# Patient Record
Sex: FEMALE | Race: WHITE | NOT HISPANIC OR LATINO
[De-identification: names, ages, dates, MRNs, and addresses within clinical notes are randomized per-mention and may not be internally consistent; named-entity substitution may affect disease eponyms.]

---

## 2017-02-02 ENCOUNTER — RESULT REVIEW (OUTPATIENT)
Age: 52
End: 2017-02-02

## 2019-01-01 ENCOUNTER — APPOINTMENT (OUTPATIENT)
Dept: THORACIC SURGERY | Facility: CLINIC | Age: 54
End: 2019-01-01
Payer: COMMERCIAL

## 2019-01-01 ENCOUNTER — OUTPATIENT (OUTPATIENT)
Dept: OUTPATIENT SERVICES | Facility: HOSPITAL | Age: 54
LOS: 1 days | End: 2019-01-01

## 2019-01-01 ENCOUNTER — APPOINTMENT (OUTPATIENT)
Dept: THORACIC SURGERY | Facility: HOSPITAL | Age: 54
End: 2019-01-01

## 2019-01-01 ENCOUNTER — OUTPATIENT (OUTPATIENT)
Dept: OUTPATIENT SERVICES | Facility: HOSPITAL | Age: 54
LOS: 1 days | Discharge: HOME | End: 2019-01-01
Payer: COMMERCIAL

## 2019-01-01 VITALS
OXYGEN SATURATION: 98 % | SYSTOLIC BLOOD PRESSURE: 128 MMHG | TEMPERATURE: 98 F | DIASTOLIC BLOOD PRESSURE: 82 MMHG | HEIGHT: 61.5 IN | RESPIRATION RATE: 16 BRPM | HEART RATE: 88 BPM | WEIGHT: 162.04 LBS

## 2019-01-01 VITALS
HEART RATE: 76 BPM | WEIGHT: 157 LBS | OXYGEN SATURATION: 98 % | RESPIRATION RATE: 17 BRPM | HEIGHT: 65 IN | SYSTOLIC BLOOD PRESSURE: 137 MMHG | TEMPERATURE: 97.9 F | BODY MASS INDEX: 26.16 KG/M2 | DIASTOLIC BLOOD PRESSURE: 79 MMHG

## 2019-01-01 VITALS
TEMPERATURE: 98.8 F | BODY MASS INDEX: 26.16 KG/M2 | HEART RATE: 86 BPM | HEIGHT: 65 IN | RESPIRATION RATE: 17 BRPM | DIASTOLIC BLOOD PRESSURE: 81 MMHG | OXYGEN SATURATION: 99 % | SYSTOLIC BLOOD PRESSURE: 133 MMHG | WEIGHT: 157 LBS

## 2019-01-01 VITALS
HEIGHT: 65 IN | WEIGHT: 162 LBS | BODY MASS INDEX: 26.99 KG/M2 | HEART RATE: 89 BPM | DIASTOLIC BLOOD PRESSURE: 79 MMHG | TEMPERATURE: 98.5 F | SYSTOLIC BLOOD PRESSURE: 144 MMHG | OXYGEN SATURATION: 96 % | RESPIRATION RATE: 17 BRPM

## 2019-01-01 VITALS
HEIGHT: 61.5 IN | TEMPERATURE: 99 F | DIASTOLIC BLOOD PRESSURE: 50 MMHG | HEART RATE: 85 BPM | RESPIRATION RATE: 16 BRPM | OXYGEN SATURATION: 97 % | WEIGHT: 162.04 LBS | SYSTOLIC BLOOD PRESSURE: 108 MMHG

## 2019-01-01 VITALS
SYSTOLIC BLOOD PRESSURE: 117 MMHG | DIASTOLIC BLOOD PRESSURE: 49 MMHG | RESPIRATION RATE: 14 BRPM | OXYGEN SATURATION: 100 % | HEART RATE: 80 BPM

## 2019-01-01 DIAGNOSIS — C50.919 MALIGNANT NEOPLASM OF UNSPECIFIED SITE OF UNSPECIFIED FEMALE BREAST: ICD-10-CM

## 2019-01-01 DIAGNOSIS — J90 PLEURAL EFFUSION, NOT ELSEWHERE CLASSIFIED: ICD-10-CM

## 2019-01-01 DIAGNOSIS — Z87.39 PERSONAL HISTORY OF OTHER DISEASES OF THE MUSCULOSKELETAL SYSTEM AND CONNECTIVE TISSUE: ICD-10-CM

## 2019-01-01 DIAGNOSIS — Z87.09 PERSONAL HISTORY OF OTHER DISEASES OF THE RESPIRATORY SYSTEM: Chronic | ICD-10-CM

## 2019-01-01 DIAGNOSIS — J91.0 MALIGNANT PLEURAL EFFUSION: ICD-10-CM

## 2019-01-01 LAB
ANION GAP SERPL CALC-SCNC: 11 MMO/L — SIGNIFICANT CHANGE UP (ref 7–14)
BUN SERPL-MCNC: 11 MG/DL — SIGNIFICANT CHANGE UP (ref 7–23)
CALCIUM SERPL-MCNC: 9.5 MG/DL — SIGNIFICANT CHANGE UP (ref 8.4–10.5)
CHLORIDE SERPL-SCNC: 101 MMOL/L — SIGNIFICANT CHANGE UP (ref 98–107)
CO2 SERPL-SCNC: 25 MMOL/L — SIGNIFICANT CHANGE UP (ref 22–31)
CREAT SERPL-MCNC: 0.66 MG/DL — SIGNIFICANT CHANGE UP (ref 0.5–1.3)
GLUCOSE SERPL-MCNC: 84 MG/DL — SIGNIFICANT CHANGE UP (ref 70–99)
HCT VFR BLD CALC: 35.2 % — SIGNIFICANT CHANGE UP (ref 34.5–45)
HGB BLD-MCNC: 10.9 G/DL — LOW (ref 11.5–15.5)
MCHC RBC-ENTMCNC: 27.2 PG — SIGNIFICANT CHANGE UP (ref 27–34)
MCHC RBC-ENTMCNC: 31 % — LOW (ref 32–36)
MCV RBC AUTO: 87.8 FL — SIGNIFICANT CHANGE UP (ref 80–100)
NON-GYNECOLOGICAL CYTOLOGY STUDY: SIGNIFICANT CHANGE UP
NRBC # FLD: 0 K/UL — SIGNIFICANT CHANGE UP (ref 0–0)
PLATELET # BLD AUTO: 372 K/UL — SIGNIFICANT CHANGE UP (ref 150–400)
PMV BLD: 10.3 FL — SIGNIFICANT CHANGE UP (ref 7–13)
POTASSIUM SERPL-MCNC: 4.6 MMOL/L — SIGNIFICANT CHANGE UP (ref 3.5–5.3)
POTASSIUM SERPL-SCNC: 4.6 MMOL/L — SIGNIFICANT CHANGE UP (ref 3.5–5.3)
RBC # BLD: 4.01 M/UL — SIGNIFICANT CHANGE UP (ref 3.8–5.2)
RBC # FLD: 17 % — HIGH (ref 10.3–14.5)
SODIUM SERPL-SCNC: 137 MMOL/L — SIGNIFICANT CHANGE UP (ref 135–145)
WBC # BLD: 1.83 K/UL — LOW (ref 3.8–10.5)
WBC # FLD AUTO: 1.83 K/UL — LOW (ref 3.8–10.5)

## 2019-01-01 PROCEDURE — 99024 POSTOP FOLLOW-UP VISIT: CPT

## 2019-01-01 PROCEDURE — 99213 OFFICE O/P EST LOW 20 MIN: CPT

## 2019-01-01 PROCEDURE — 32552 REMOVE LUNG CATHETER: CPT

## 2019-01-01 PROCEDURE — 71045 X-RAY EXAM CHEST 1 VIEW: CPT | Mod: 26

## 2019-01-01 RX ORDER — HYDROMORPHONE HYDROCHLORIDE 2 MG/1
2 TABLET ORAL
Refills: 0 | Status: ACTIVE | COMMUNITY

## 2019-01-01 RX ORDER — SODIUM CHLORIDE 9 MG/ML
1000 INJECTION, SOLUTION INTRAVENOUS
Refills: 0 | Status: DISCONTINUED | OUTPATIENT
Start: 2019-01-01 | End: 2019-01-01

## 2019-01-01 RX ORDER — ONDANSETRON HYDROCHLORIDE 4 MG/1
4 TABLET, FILM COATED ORAL
Refills: 0 | Status: ACTIVE | COMMUNITY

## 2019-09-04 ENCOUNTER — TRANSCRIPTION ENCOUNTER (OUTPATIENT)
Age: 54
End: 2019-09-04

## 2019-09-04 ENCOUNTER — APPOINTMENT (OUTPATIENT)
Dept: THORACIC SURGERY | Facility: CLINIC | Age: 54
End: 2019-09-04
Payer: COMMERCIAL

## 2019-09-04 VITALS
SYSTOLIC BLOOD PRESSURE: 138 MMHG | HEART RATE: 85 BPM | RESPIRATION RATE: 17 BRPM | DIASTOLIC BLOOD PRESSURE: 78 MMHG | TEMPERATURE: 98.3 F | WEIGHT: 168 LBS | HEIGHT: 65 IN | BODY MASS INDEX: 27.99 KG/M2 | OXYGEN SATURATION: 95 %

## 2019-09-04 DIAGNOSIS — Z82.49 FAMILY HISTORY OF ISCHEMIC HEART DISEASE AND OTHER DISEASES OF THE CIRCULATORY SYSTEM: ICD-10-CM

## 2019-09-04 DIAGNOSIS — Z82.3 FAMILY HISTORY OF STROKE: ICD-10-CM

## 2019-09-04 DIAGNOSIS — Z80.51 FAMILY HISTORY OF MALIGNANT NEOPLASM OF KIDNEY: ICD-10-CM

## 2019-09-04 PROCEDURE — 99205 OFFICE O/P NEW HI 60 MIN: CPT

## 2019-09-04 RX ORDER — MELOXICAM 15 MG/1
15 TABLET ORAL
Refills: 0 | Status: ACTIVE | COMMUNITY

## 2019-09-04 RX ORDER — LORATADINE 10 MG/1
TABLET ORAL
Refills: 0 | Status: ACTIVE | COMMUNITY

## 2019-09-04 RX ORDER — LORAZEPAM 1 MG/1
1 TABLET ORAL
Refills: 0 | Status: ACTIVE | COMMUNITY

## 2019-09-04 RX ORDER — OMEPRAZOLE 40 MG/1
40 CAPSULE, DELAYED RELEASE ORAL
Refills: 0 | Status: ACTIVE | COMMUNITY

## 2019-09-05 ENCOUNTER — FORM ENCOUNTER (OUTPATIENT)
Age: 54
End: 2019-09-05

## 2019-09-05 ENCOUNTER — TRANSCRIPTION ENCOUNTER (OUTPATIENT)
Age: 54
End: 2019-09-05

## 2019-09-06 ENCOUNTER — OUTPATIENT (OUTPATIENT)
Dept: OUTPATIENT SERVICES | Facility: HOSPITAL | Age: 54
LOS: 1 days | Discharge: HOME | End: 2019-09-06
Payer: COMMERCIAL

## 2019-09-06 ENCOUNTER — APPOINTMENT (OUTPATIENT)
Dept: THORACIC SURGERY | Facility: HOSPITAL | Age: 54
End: 2019-09-06

## 2019-09-06 ENCOUNTER — TRANSCRIPTION ENCOUNTER (OUTPATIENT)
Age: 54
End: 2019-09-06

## 2019-09-06 ENCOUNTER — RESULT REVIEW (OUTPATIENT)
Age: 54
End: 2019-09-06

## 2019-09-06 VITALS
HEIGHT: 61 IN | TEMPERATURE: 98 F | WEIGHT: 164.91 LBS | SYSTOLIC BLOOD PRESSURE: 104 MMHG | DIASTOLIC BLOOD PRESSURE: 79 MMHG | HEART RATE: 91 BPM | RESPIRATION RATE: 17 BRPM | OXYGEN SATURATION: 97 %

## 2019-09-06 VITALS
HEART RATE: 97 BPM | RESPIRATION RATE: 18 BRPM | DIASTOLIC BLOOD PRESSURE: 75 MMHG | OXYGEN SATURATION: 100 % | SYSTOLIC BLOOD PRESSURE: 125 MMHG

## 2019-09-06 DIAGNOSIS — J90 PLEURAL EFFUSION, NOT ELSEWHERE CLASSIFIED: ICD-10-CM

## 2019-09-06 LAB
BASOPHILS # BLD AUTO: 0.02 K/UL — SIGNIFICANT CHANGE UP (ref 0–0.2)
BASOPHILS NFR BLD AUTO: 0.4 % — SIGNIFICANT CHANGE UP (ref 0–2)
EOSINOPHIL # BLD AUTO: 0.11 K/UL — SIGNIFICANT CHANGE UP (ref 0–0.5)
EOSINOPHIL NFR BLD AUTO: 2.4 % — SIGNIFICANT CHANGE UP (ref 0–6)
GAS PNL BLDV: 141 MMOL/L — SIGNIFICANT CHANGE UP (ref 136–146)
GLUCOSE BLDV-MCNC: 91 MG/DL — SIGNIFICANT CHANGE UP (ref 70–99)
HCT VFR BLD CALC: 39 % — SIGNIFICANT CHANGE UP (ref 34.5–45)
HCT VFR BLDV CALC: 37.8 % — SIGNIFICANT CHANGE UP (ref 34.5–45)
HGB BLD-MCNC: 12.3 G/DL — SIGNIFICANT CHANGE UP (ref 11.5–15.5)
IMM GRANULOCYTES NFR BLD AUTO: 0.4 % — SIGNIFICANT CHANGE UP (ref 0–1.5)
LYMPHOCYTES # BLD AUTO: 0.68 K/UL — LOW (ref 1–3.3)
LYMPHOCYTES # BLD AUTO: 14.9 % — SIGNIFICANT CHANGE UP (ref 13–44)
MCHC RBC-ENTMCNC: 25.4 PG — LOW (ref 27–34)
MCHC RBC-ENTMCNC: 31.5 % — LOW (ref 32–36)
MCV RBC AUTO: 80.6 FL — SIGNIFICANT CHANGE UP (ref 80–100)
MONOCYTES # BLD AUTO: 0.41 K/UL — SIGNIFICANT CHANGE UP (ref 0–0.9)
MONOCYTES NFR BLD AUTO: 9 % — SIGNIFICANT CHANGE UP (ref 2–14)
NEUTROPHILS # BLD AUTO: 3.31 K/UL — SIGNIFICANT CHANGE UP (ref 1.8–7.4)
NEUTROPHILS NFR BLD AUTO: 72.9 % — SIGNIFICANT CHANGE UP (ref 43–77)
NRBC # FLD: 0.02 K/UL — SIGNIFICANT CHANGE UP (ref 0–0)
PLATELET # BLD AUTO: 236 K/UL — SIGNIFICANT CHANGE UP (ref 150–400)
PMV BLD: 10.2 FL — SIGNIFICANT CHANGE UP (ref 7–13)
POTASSIUM BLDV-SCNC: 4.9 MMOL/L — HIGH (ref 3.4–4.5)
RBC # BLD: 4.84 M/UL — SIGNIFICANT CHANGE UP (ref 3.8–5.2)
RBC # FLD: 13.8 % — SIGNIFICANT CHANGE UP (ref 10.3–14.5)
WBC # BLD: 4.55 K/UL — SIGNIFICANT CHANGE UP (ref 3.8–10.5)
WBC # FLD AUTO: 4.55 K/UL — SIGNIFICANT CHANGE UP (ref 3.8–10.5)

## 2019-09-06 PROCEDURE — 88112 CYTOPATH CELL ENHANCE TECH: CPT | Mod: 26

## 2019-09-06 PROCEDURE — 88305 TISSUE EXAM BY PATHOLOGIST: CPT | Mod: 26

## 2019-09-06 PROCEDURE — 32550 INSERT PLEURAL CATH: CPT | Mod: 59

## 2019-09-06 PROCEDURE — 71045 X-RAY EXAM CHEST 1 VIEW: CPT | Mod: 26,77

## 2019-09-06 PROCEDURE — 36563 INSERT TUNNELED CV CATH: CPT

## 2019-09-06 PROCEDURE — 71045 X-RAY EXAM CHEST 1 VIEW: CPT | Mod: 26

## 2019-09-06 PROCEDURE — 32557 INSERT CATH PLEURA W/ IMAGE: CPT

## 2019-09-06 RX ORDER — SODIUM CHLORIDE 9 MG/ML
1000 INJECTION, SOLUTION INTRAVENOUS
Refills: 0 | Status: DISCONTINUED | OUTPATIENT
Start: 2019-09-06 | End: 2019-01-01

## 2019-09-06 RX ORDER — ACETAMINOPHEN 500 MG
2 TABLET ORAL
Qty: 0 | Refills: 0 | DISCHARGE
Start: 2019-09-06

## 2019-09-06 RX ORDER — OXYCODONE HYDROCHLORIDE 5 MG/1
5 TABLET ORAL EVERY 4 HOURS
Refills: 0 | Status: DISCONTINUED | OUTPATIENT
Start: 2019-09-06 | End: 2019-09-06

## 2019-09-06 RX ORDER — MELOXICAM 15 MG/1
1 TABLET ORAL
Qty: 0 | Refills: 0 | DISCHARGE

## 2019-09-06 RX ORDER — HYDROMORPHONE HYDROCHLORIDE 2 MG/ML
1 INJECTION INTRAMUSCULAR; INTRAVENOUS; SUBCUTANEOUS ONCE
Refills: 0 | Status: DISCONTINUED | OUTPATIENT
Start: 2019-09-06 | End: 2019-09-06

## 2019-09-06 RX ORDER — DOCUSATE SODIUM 100 MG
100 CAPSULE ORAL THREE TIMES A DAY
Refills: 0 | Status: DISCONTINUED | OUTPATIENT
Start: 2019-09-06 | End: 2019-09-06

## 2019-09-06 RX ORDER — SENNA PLUS 8.6 MG/1
2 TABLET ORAL AT BEDTIME
Refills: 0 | Status: DISCONTINUED | OUTPATIENT
Start: 2019-09-06 | End: 2019-09-06

## 2019-09-06 RX ORDER — ACETAMINOPHEN 500 MG
650 TABLET ORAL ONCE
Refills: 0 | Status: DISCONTINUED | OUTPATIENT
Start: 2019-09-06 | End: 2019-09-06

## 2019-09-06 RX ORDER — SODIUM CHLORIDE 9 MG/ML
1000 INJECTION, SOLUTION INTRAVENOUS
Refills: 0 | Status: DISCONTINUED | OUTPATIENT
Start: 2019-09-06 | End: 2019-09-06

## 2019-09-06 RX ORDER — METOCLOPRAMIDE HCL 10 MG
10 TABLET ORAL ONCE
Refills: 0 | Status: COMPLETED | OUTPATIENT
Start: 2019-09-06 | End: 2019-09-06

## 2019-09-06 RX ADMIN — SODIUM CHLORIDE 30 MILLILITER(S): 9 INJECTION, SOLUTION INTRAVENOUS at 16:15

## 2019-09-06 RX ADMIN — Medication 10 MILLIGRAM(S): at 16:55

## 2019-09-06 RX ADMIN — SODIUM CHLORIDE 30 MILLILITER(S): 9 INJECTION, SOLUTION INTRAVENOUS at 11:33

## 2019-09-06 NOTE — HISTORY OF PRESENT ILLNESS
[FreeTextEntry1] : 54 year old female with history of breast cancer in 2004 s/p lumpectomy, chemo, and radiation. She was on Tamoxifen until 2015. She was found to have mets to the bones and was started on different endocrine therapies. She was found to have left pleural effusion which she underwent thoracentesis x 2. Pleural fluid was positive for malignant cells consistent with breast origin. She presents today for thoracic surgery consultation for recurrent pleural effusion referred by Dr. Leiva. \par \par CXR 8/30/19 reveals increased opacification in the left mid and lower lung zone. Pleural density has increase and extends along the lateral aspect of the hemithorax nearly to the lung apex. \par \par The patient reports increased shortness of breath with exertion, dry cough, and decreased appetite. The patient denies fever, chills, chest pain, or hemoptysis.

## 2019-09-06 NOTE — ASU DISCHARGE PLAN (ADULT/PEDIATRIC) - CARE PROVIDER_API CALL
Ronaldo Londono)  Thoracic Surgery  [id number] [place] [address]  Phone: [phone]  Fax: [phone]  Follow Up Time:

## 2019-09-06 NOTE — PHYSICAL EXAM
[General Appearance - Alert] : alert [General Appearance - Well Developed] : well developed [General Appearance - Well-Appearing] : healthy appearing [Sclera] : the sclera and conjunctiva were normal [Extraocular Movements] : extraocular movements were intact [Hearing Threshold Finger Rub Not [place]] : hearing was normal [Examination Of The Oral Cavity] : the lips and gums were normal [Neck Appearance] : the appearance of the neck was normal [Jugular Venous Distention Increased] : there was no jugular-venous distention [Neck Cervical Mass (___cm)] : no neck mass was observed [] : no respiratory distress [Exaggerated Use Of Accessory Muscles For Inspiration] : no accessory muscle use [Respiration, Rhythm And Depth] : normal respiratory rhythm and effort [Auscultation Breath Sounds / Voice Sounds] : lungs were clear to auscultation bilaterally [Diminished Respiratory Excursion] : normal chest expansion [Bowel Sounds] : normal bowel sounds [Abdomen Soft] : soft [Cervical Lymph Nodes Enlarged Anterior Bilaterally] : anterior cervical [Abdomen Tenderness] : non-tender [Cervical Lymph Nodes Enlarged Posterior Bilaterally] : posterior cervical [Abnormal Walk] : normal gait [Skin Color & Pigmentation] : normal skin color and pigmentation [No Focal Deficits] : no focal deficits [Impaired Insight] : insight and judgment were intact [Oriented To Time, Place, And Person] : oriented to person, place, and time [Affect] : the affect was normal [Mood] : the mood was normal [FreeTextEntry1] : Left lung diminished breath sounds mid to lower lung fields.

## 2019-09-06 NOTE — BRIEF OPERATIVE NOTE - OPERATION/FINDINGS
1800cc of left pleural effusion drained. Left pleurX catheter placement. Pleural fluid sent to cytology.  Right Subclavian vein Mediport placement using fluoroscopy placed for purposes of chemotherapy.

## 2019-09-06 NOTE — ASU PATIENT PROFILE, ADULT - PMH
Breast Cancer h/o chemo and radiation in 2005    Congenital Hip Dysplasia    GERD (Gastroesophageal Reflux Disease)    H/O: Osteoarthritis    HPV (Human Papillomavirus) in 2004    Hyperlipidemia - allergic to all statin drugs - on no meds

## 2019-09-06 NOTE — BRIEF OPERATIVE NOTE - NSICDXBRIEFPROCEDURE_GEN_ALL_CORE_FT
PROCEDURES:  Placement of peripherally inserted central venous catheter with imaging guidance 06-Sep-2019 16:45:21 Right Subclavian venous catheter placement (mediport) using flouroscopy for chemotherapy purposes Serene Pacheco  Drainage of pleural effusion 06-Sep-2019 16:33:58  Serene Pacheco  Insertion, PleurX catheter system, pleural cavity 06-Sep-2019 16:31:57  Serene Pacheco

## 2019-09-06 NOTE — H&P ADULT - NSICDXPASTSURGICALHX_GEN_ALL_CORE_FT
PAST SURGICAL HISTORY:  Breast Cancer - chemoport insertion  and removal 2005 and 2006     Cryo ablation of cervix in 2006     h/o  Breast Lumpectomyn - left 2005     H/O: Myomectomy 2003     History of Total Hip Replacement left 2003

## 2019-09-06 NOTE — DISCHARGE NOTE PROVIDER - NSDCFUADDINST_GEN_ALL_CORE_FT
- Leave dressing intact for Mediport and PleurX by reinforcing with tape if necessary. At that time you may remove the dressing and take a shower. Place clean gauze over wound if continual drainage. Cover pleurX dressing when showering. Continue with daily ambulation.   - Call the office if you experience any fevers, shortness of breath, chest pain or excessive or purulent drainage from the incision site, leg swelling day or night. Go to the emergency room if any of these symptoms are severe.   - Visiting nurse services are being set up for you to drain your PleurX 3x a week.   - Take your medications as ordered.  - Call Dr. Londono's office at [phone] tomorrow or the next business day to make a followup appointment.

## 2019-09-06 NOTE — ASU DISCHARGE PLAN (ADULT/PEDIATRIC) - POST OP PHONE #
[phone] pt. granted permission to leave message /and or speak with whoever answers the phone.

## 2019-09-06 NOTE — PHYSICAL EXAM
[General Appearance - Alert] : alert [General Appearance - Well Developed] : well developed [General Appearance - Well-Appearing] : healthy appearing [Sclera] : the sclera and conjunctiva were normal [Extraocular Movements] : extraocular movements were intact [Hearing Threshold Finger Rub Not [place]] : hearing was normal [Examination Of The Oral Cavity] : the lips and gums were normal [Neck Appearance] : the appearance of the neck was normal [Neck Cervical Mass (___cm)] : no neck mass was observed [Jugular Venous Distention Increased] : there was no jugular-venous distention [] : no respiratory distress [Exaggerated Use Of Accessory Muscles For Inspiration] : no accessory muscle use [Respiration, Rhythm And Depth] : normal respiratory rhythm and effort [Auscultation Breath Sounds / Voice Sounds] : lungs were clear to auscultation bilaterally [Diminished Respiratory Excursion] : normal chest expansion [Bowel Sounds] : normal bowel sounds [Abdomen Soft] : soft [Cervical Lymph Nodes Enlarged Anterior Bilaterally] : anterior cervical [Abdomen Tenderness] : non-tender [Cervical Lymph Nodes Enlarged Posterior Bilaterally] : posterior cervical [Abnormal Walk] : normal gait [Skin Color & Pigmentation] : normal skin color and pigmentation [No Focal Deficits] : no focal deficits [Oriented To Time, Place, And Person] : oriented to person, place, and time [Impaired Insight] : insight and judgment were intact [Affect] : the affect was normal [Mood] : the mood was normal [FreeTextEntry1] : Left lung diminished breath sounds mid to lower lung fields.

## 2019-09-06 NOTE — H&P ADULT - NSHPPHYSICALEXAM_GEN_ALL_CORE
Eyes: the sclera and conjunctiva were normal and extraocular movements were intact.   ENT: hearing was normal and the lips and gums were normal.   Neck: the appearance of the neck was normal, the neck was supple and no neck mass was observed . there was no jugular-venous distention.   Pulmonary: no respiratory distress, normal respiratory rhythm and effort, no accessory muscle use and lungs were clear to auscultation bilaterally . Left lung diminished breath sounds mid to lower lung fields.   Chest: normal chest expansion.   Abdomen: normal bowel sounds, soft and non-tender.   Lymphatics: The posterior cervical and anterior cervical nodes were non-tender and normal size.   Musculoskeletal: normal gait.   Skin: normal skin color and pigmentation.   Neurological: no focal deficits.   Psychiatric: oriented to person, place, and time, insight and judgment were intact, the affect was normal and the mood was normal.

## 2019-09-06 NOTE — ASU DISCHARGE PLAN (ADULT/PEDIATRIC) - CALL YOUR DOCTOR IF YOU HAVE ANY OF THE FOLLOWING:
Bleeding that does not stop Fever greater than (need to indicate Fahrenheit or Celsius)/shortness of breath/Nausea and vomiting that does not stop/Unable to urinate/Wound/Surgical Site with redness, or foul smelling discharge or pus/Pain not relieved by Medications/Bleeding that does not stop/Swelling that gets worse

## 2019-09-06 NOTE — H&P ADULT - ASSESSMENT
54 year old female with history of breast cancer in 2004 s/p lumpectomy, chemo, and radiation. She was on Tamoxifen until 2015. She was found to have mets to the bones and was started on different endocrine therapies. She was found to have left pleural effusion which she underwent thoracentesis x 2. Pleural fluid was positive for malignant cells consistent with breast origin. She presents today for thoracic surgery consultation for recurrent pleural effusion referred by Dr. Leiva.     CXR 8/30/19 reveals increased opacification in the left mid and lower lung zone. Pleural density has increase and extends along the lateral aspect of the hemithorax nearly to the lung apex.     The patient reports increased shortness of breath with exertion, dry cough, and decreased appetite. The patient denies fever, chills, chest pain, or hemoptysis.     I have reviewed the medical records and images with the patient and made the following recommendations.We discussed options for recurrent pleural effusion including Talc Pleurodesis and Pleurx catheter placement. The patient preferred Pleurx catheter placement in order to begin chemotherapy as soon as possible. I will schedule her for placement of left Pleurx catheter and Mediport. The risks, benefits, and alternatives to the procedure were discussed with the patient at length. She verbalized understanding, and is in agreement with the above treatment plan.

## 2019-09-06 NOTE — ASSESSMENT
[FreeTextEntry1] : 54 year old female with history of breast cancer in 2004 s/p lumpectomy, chemo, and radiation. She was on Tamoxifen until 2015. She was found to have mets to the bones and was started on different endocrine therapies. She was found to have left pleural effusion which she underwent thoracentesis x 2. Pleural fluid was positive for malignant cells consistent with breast origin. She presents today for thoracic surgery consultation for recurrent pleural effusion referred by Dr. Leiva. \par \par CXR 8/30/19 reveals increased opacification in the left mid and lower lung zone. Pleural density has increase and extends along the lateral aspect of the hemithorax nearly to the lung apex. \par \par The patient reports increased shortness of breath with exertion, dry cough, and decreased appetite. The patient denies fever, chills, chest pain, or hemoptysis. \par \par I have reviewed the medical records and images with the patient and made the following recommendations.We discussed options for recurrent pleural effusion including Talc Pleurodesis and Pleurx catheter placement. The patient preferred Pleurx catheter placement in order to begin chemotherapy as soon as possible. I will schedule her for placement of left Pleurx catheter and Mediport. The risks, benefits, and alternatives to the procedure were discussed with the patient at length. She verbalized understanding, and is in agreement with the above treatment plan. \par \par Written by Ilene Warren NP, acting as a scribe for Ronaldo Pike MD.\par The documentation recorded by the scribe accurately reflects the service I personally performed and the decisions made by me. RONALDO PIKE MD\par  \par

## 2019-09-06 NOTE — ASU DISCHARGE PLAN (ADULT/PEDIATRIC) - ASU DC SPECIAL INSTRUCTIONSFT
FOLLOW-UP:  1. Please call to make a follow-up appointment within two weeks of discharge with Dr. Londono (See below for office information to call for an appointment)   BATHING: Please do not submerge wound underwater. You may shower and/or sponge bathe. FOLLOW-UP:  1. Please call to make a follow-up appointment within two weeks of discharge with Dr. Londono (See below for office information to call for an appointment)   BATHING: Please do not submerge wound underwater. You may shower and/or sponge bathe.    Leave the plastic dressing over the PleurX.  The [facility] RN will change that dressing when your drain is used  The Mediport outer dressing of gauze and plastic can be removed in 2 days.  If there are tapes on the wound, leave those and they will come off when you shower.

## 2019-09-06 NOTE — DISCHARGE NOTE PROVIDER - NSDCCPCAREPLAN_GEN_ALL_CORE_FT
PRINCIPAL DISCHARGE DIAGNOSIS  Diagnosis: Malignant pleural effusion  Assessment and Plan of Treatment:       SECONDARY DISCHARGE DIAGNOSES  Diagnosis: Breast cancer  Assessment and Plan of Treatment:

## 2019-09-06 NOTE — DISCHARGE NOTE PROVIDER - NSDCACTIVITY_GEN_ALL_CORE
Walking - Outdoors allowed/Showering allowed/No heavy lifting/straining/Do not drive or operate machinery/Do not make important decisions/Stairs allowed/Walking - Indoors allowed

## 2019-09-06 NOTE — DISCHARGE NOTE PROVIDER - CARE PROVIDER_API CALL
Ronaldo Londono)  Thoracic Surgery  [address] [place] [address]  Phone: [phone]  Fax: [phone]  Follow Up Time:

## 2019-09-06 NOTE — CONSULT LETTER
[Consult Letter:] : I had the pleasure of evaluating your patient, [unfilled]. [Dear  ___] : Dear  [unfilled], [Sincerely,] : Sincerely, [Consult Closing:] : Thank you very much for allowing me to participate in the care of this patient.  If you have any questions, please do not hesitate to contact me. [Please see my note below.] : Please see my note below. [FreeTextEntry2] : Dr. Roman Leiva (Ref/Pulm)\par Dr. Zuñiga (PCP)\par Dr. Chong (Card)\par Dr. Landrum (Onc) [FreeTextEntry3] : Ronaldo Londono MD, FACS \par , Division of Thoracic Surgery \par [facility] \par Chief, Thoracic Surgery \par [facility] \par Department of Cardiovascular & Thoracic Surgery \par  \par [facility] [employer] [facility] \par

## 2019-09-06 NOTE — CONSULT LETTER
[Consult Letter:] : I had the pleasure of evaluating your patient, [unfilled]. [Dear  ___] : Dear  [unfilled], [Sincerely,] : Sincerely, [Please see my note below.] : Please see my note below. [Consult Closing:] : Thank you very much for allowing me to participate in the care of this patient.  If you have any questions, please do not hesitate to contact me. [FreeTextEntry3] : Ronaldo Londono MD, FACS \par , Division of Thoracic Surgery \par [facility] \par Chief, Thoracic Surgery \par [facility] \par Department of Cardiovascular & Thoracic Surgery \par  \par [facility] [employer] at [facility] \par   [FreeTextEntry2] : Dr. Roman Leiva (Ref/Pulm)\par Dr. Zuñiga (PCP)\par Dr. Chong (Card)\par Dr. Landrum (Onc)

## 2019-09-06 NOTE — H&P ADULT - NSICDXPASTMEDICALHX_GEN_ALL_CORE_FT
PAST MEDICAL HISTORY:  Breast Cancer h/o chemo and radiation in 2005     Congenital Hip Dysplasia     GERD (Gastroesophageal Reflux Disease)     H/O: Osteoarthritis     HPV (Human Papillomavirus) in 2004     Hyperlipidemia - allergic to all statin drugs - on no meds

## 2019-09-06 NOTE — ASU PATIENT PROFILE, ADULT - PSH
Breast Cancer - chemoport insertion  and removal 2005 and 2006    Cryo ablation of cervix in 2006    h/o  Breast Lumpectomyn - left 2005    H/O: Myomectomy 2003    History of Total Hip Replacement left 2003 Breast Cancer - chemoport insertion  and removal 2005 and 2006    Cryo ablation of cervix in 2006    h/o  Breast Lumpectomyn - left 2005    H/O: Myomectomy 2003    History of Total Hip Replacement left 2003  right thr [address]

## 2019-09-25 PROBLEM — Z87.39 HISTORY OF ARTHRITIS: Status: RESOLVED | Noted: 2019-01-01 | Resolved: 2019-01-01

## 2019-09-25 PROBLEM — C50.919 MALIGNANT NEOPLASM OF BREAST: Status: ACTIVE | Noted: 2019-09-04

## 2019-10-01 NOTE — CONSULT LETTER
[Dear  ___] : Dear  [unfilled], [Courtesy Letter:] : I had the pleasure of seeing your patient, [unfilled], in my office today. [Please see my note below.] : Please see my note below. [Sincerely,] : Sincerely, [FreeTextEntry2] : Dr. Roman Leiva (Ref/Pulm)\par Dr. Zuñiga (PCP)\par Dr. Chong (Card)\par Dr. Brent Landrum (Onc) \par  [FreeTextEntry3] : \par \par \par Ronaldo Londono MD, FACS \par , Division of Thoracic Surgery \par [facility] \par Chief, Thoracic Surgery \par [facility] \par Department of Cardiovascular & Thoracic Surgery \par  \par [facility] [employer] [facility] at [facility]

## 2019-10-01 NOTE — PHYSICAL EXAM
[Sclera] : the sclera and conjunctiva were normal [Neck Appearance] : the appearance of the neck was normal [Neck Cervical Mass (___cm)] : no neck mass was observed [] : the neck was supple [Auscultation Breath Sounds / Voice Sounds] : lungs were clear to auscultation bilaterally [Respiration, Rhythm And Depth] : normal respiratory rhythm and effort [Heart Rate And Rhythm] : heart rate was normal and rhythm regular [Heart Sounds] : normal S1 and S2 [Examination Of The Chest] : the chest was normal in appearance [Abdomen Soft] : soft [Cervical Lymph Nodes Enlarged Posterior Bilaterally] : posterior cervical [Cervical Lymph Nodes Enlarged Anterior Bilaterally] : anterior cervical [Abdomen Tenderness] : non-tender [No CVA Tenderness] : no ~M costovertebral angle tenderness [Supraclavicular Lymph Nodes Enlarged Bilaterally] : supraclavicular [Skin Color & Pigmentation] : normal skin color and pigmentation [Abnormal Walk] : normal gait [No Focal Deficits] : no focal deficits [Oriented To Time, Place, And Person] : oriented to person, place, and time [Impaired Insight] : insight and judgment were intact [Affect] : the affect was normal [FreeTextEntry1] : Left PleurX dressing C,D,I

## 2019-10-01 NOTE — HISTORY OF PRESENT ILLNESS
[FreeTextEntry1] : Ms. WHITLEY SILVA is a 54 year old female presenting for follow up S/P insertion of right MediPort catheter, left PleurX catheter placement on 9/6/19.  Cytopathology was suspicious for metastatic adenocarcinoma.  \par \par She is a non-smoker and has history of breast cancer dx'd in 2004 s/p lumpectomy, chemoRT and Tamoxifen until 2015. She was found to have mets to the bones and was started on different endocrine therapies. She was found to have left pleural effusion which she underwent thoracentesis x 2. Pleural fluid was positive for malignant cells consistent with breast origin. \par \par CXR 9/6/19 revealed lower left chest tube is in situ. Right-sided MediPort is present with it's tip at the cavoatrial junction.  Small peripherally located pneumothorax is seen around the left lung.  Mid left lung and lower left lung airspace opacity may be due to atelectasis but underlying pneumonia not excluded in the right clinical setting.  Small left pleural effusion and/or pleural thickening present.  Right lung is clear. \par \par She returns today for postop evaluation.  She complains of discomfort from the PleurX catheter at the end of and after the PleurX is drained.  She reports that she is using the Dilaudid (prescribed for bone pain) sparingly with good effect.   She continues to complain of a dry cough. \par \par She reports that the PleurX is being drained TIW (Mon/Wed/Fri) by visiting nurse and reports drainage as follows:\par 9/6/19- 2.1 Liter\par 9/7/19- 200 ml\par 9/9/19- 150 ml\par 9/11/19- 200 ml\par 9/13/19- 300 ml\par 9/16/19- 275 ml\par 9/18/19- 275 ml\par 9/20/19- 250 ml\par 9/23/19- 175 ml\par \par Today her PleurX was accessed and drained 175 ml serosanguineous drainage. PleurX site clean, dry and intact.  Tegaderm dressing applied. \par

## 2019-10-01 NOTE — ASSESSMENT
[FreeTextEntry1] : 53 y/o female presenting for follow up S/P insertion of right MediPort catheter, left PleurX catheter placement on 9/6/19.  Cytopathology was suspicious for metastatic adenocarcinoma.  \par \par She is a non-smoker and has history of breast cancer dx'd in 2004 s/p lumpectomy, chemoRT and Tamoxifen until 2015. She was found to have mets to the bones and was started on different endocrine therapies. She was found to have left pleural effusion which she underwent thoracentesis x 2. Pleural fluid was positive for malignant cells consistent with breast origin. \par \par CXR 9/6/19 revealed lower left chest tube is in situ. Right-sided MediPort is present with it's tip at the cavoatrial junction.  Small peripherally located pneumothorax is seen around the left lung.  Mid left lung and lower left lung airspace opacity may be due to atelectasis but underlying pneumonia not excluded in the right clinical setting.  Small left pleural effusion and/or pleural thickening present.  Right lung is clear. \par \par I have reviewed the patient's medical records and diagnostic images during the time of this office visit, and I have recommended that she follow up in 3 weeks with CXR.  Continue PleurX drainage TIW.  \par \par Written by Gin Herrera NP, acting as a scribe for Nia Pike MD.\par \par The documentation recorded by the scribe accurately reflects the service I personally performed and the decisions made by me. NIA PIKE MD\par \par

## 2019-10-01 NOTE — REVIEW OF SYSTEMS
[Feeling Tired] : feeling tired [Cough] : cough [Constipation] : constipation [Negative] : Heme/Lymph [Fever] : no fever [Feeling Poorly] : not feeling poorly [Chills] : no chills [Recent Weight Loss (___ Lbs)] : no recent weight loss [Recent Weight Gain (___ Lbs)] : no recent weight gain [Wheezing] : no wheezing [Abdominal Pain] : no abdominal pain [SOB on Exertion] : no shortness of breath during exertion [Vomiting] : no vomiting [FreeTextEntry2] : stable chronic fatigue [FreeTextEntry7] : + nausea with chemo, constipation due to pain medication

## 2019-10-17 NOTE — HISTORY OF PRESENT ILLNESS
[FreeTextEntry1] : 54 year old female presenting for follow up S/P insertion of right MediPort catheter, left PleurX catheter placement on 9/6/19. Cytopathology was suspicious for metastatic adenocarcinoma. \par \par She is a non-smoker and has history of breast cancer dx'd in 2004 s/p lumpectomy, chemoRT and Tamoxifen until 2015. She was found to have mets to the bones and was started on different endocrine therapies. She was found to have left pleural effusion which she underwent thoracentesis x 2. Pleural fluid was positive for malignant cells consistent with breast origin. \par \par CXR 9/6/19 revealed lower left chest tube is in situ. Right-sided MediPort is present with it's tip at the cavoatrial junction. Small peripherally located pneumothorax is seen around the left lung. Mid left lung and lower left lung airspace opacity may be due to atelectasis but underlying pneumonia not excluded in the right clinical setting. Small left pleural effusion and/or pleural thickening present. Right lung is clear. \par \par She reports that the PleurX is being drained TIW (Mon/Wed/Fri) by visiting nurse and reports drainage as follows:\par 9/27/19: 125 ml\par 9/30/19: 125 ml\par 10/2/19: 200 ml\par 10/4/19: 100 ml\par 10/7/19: 175 ml\par 10/10/19: 125 ml\par 10/12/19: 150 ml\par 10/14/19: 100 ml\par \par Today Left Pleurx was drained for 100ml serosanguinous drainage.  PleurX site clean, dry and intact, Tegaderm dressing applied. \par \par She has completed her 5th chemotherapy treatment and reports that she is doing well.  She denies any fever, chills, cough, shortness of breath, chest pain, hemoptysis, or recent illness. \par

## 2019-10-17 NOTE — CONSULT LETTER
[Dear  ___] : Dear  [unfilled], [Courtesy Letter:] : I had the pleasure of seeing your patient, [unfilled], in my office today. [Please see my note below.] : Please see my note below. [Sincerely,] : Sincerely, [FreeTextEntry2] : Dr. Roman Leiva (Ref/Pulm)\par Dr. Zuñiga (PCP)\par Dr. Chong (Card)\par Dr. Landrum (Onc) \par  [FreeTextEntry3] : \par Ronaldo Londono MD, FACS \par , Division of Thoracic Surgery \par [facility] \par Chief, Thoracic Surgery \par [facility] \par Department of Cardiovascular & Thoracic Surgery \par  \par [facility] [employer] at [facility] \par

## 2019-10-17 NOTE — ASSESSMENT
[FreeTextEntry1] : 54 year old female S/P insertion of right MediPort catheter, left PleurX catheter placement on 9/6/19. Cytopathology was suspicious for metastatic adenocarcinoma. \par \par She is a non-smoker and has history of breast cancer dx'd in 2004 s/p lumpectomy, chemoRT and Tamoxifen until 2015. She was found to have mets to the bones and was started on different endocrine therapies. She was found to have left pleural effusion which she underwent thoracentesis x 2. Pleural fluid was positive for malignant cells consistent with breast origin. \par \par CXR 9/6/19 revealed lower left chest tube is in situ. Right-sided MediPort is present with it's tip at the cavoatrial junction. Small peripherally located pneumothorax is seen around the left lung. Mid left lung and lower left lung airspace opacity may be due to atelectasis but underlying pneumonia not excluded in the right clinical setting. Small left pleural effusion and/or pleural thickening present. Right lung is clear. \par \par She reports that the PleurX is being drained TIW (Mon/Wed/Fri) by visiting nurse and reports drainage as follows:\par 9/27/19: 125 ml\par 9/30/19: 125 ml\par 10/2/19: 200 ml\par 10/4/19: 100 ml\par 10/7/19: 175 ml\par 10/10/19: 125 ml\par 10/12/19: 150 ml\par 10/14/19: 100 ml\par \par Today Left Pleurx was drained for 100ml serosanguinous drainage.  PleurX site clean, dry and intact, Tegaderm dressing applied. \par \par I have reviewed the patient's medical records and diagnostic images during the time of this office visit, and I have recommended that she decrease PleurX drainage to twice weekly.  She was advised to continue follow up with oncologist as scheduled.  She was instructed to contact our office with any concerns regarding the PleurX or for PleurX removal.\par \par Written by Gin Herrera NP, acting as a scribe for Nia Pike MD.\par \par The documentation recorded by the scribe accurately reflects the service I personally performed and the decisions made by me. NIA PIKE MD\par

## 2019-10-17 NOTE — PHYSICAL EXAM
[Sclera] : the sclera and conjunctiva were normal [Neck Appearance] : the appearance of the neck was normal [] : the neck was supple [Neck Cervical Mass (___cm)] : no neck mass was observed [Respiration, Rhythm And Depth] : normal respiratory rhythm and effort [Auscultation Breath Sounds / Voice Sounds] : lungs were clear to auscultation bilaterally [Heart Rate And Rhythm] : heart rate was normal and rhythm regular [Heart Sounds] : normal S1 and S2 [Chest Visual Inspection Thoracic Asymmetry] : no chest asymmetry [Abdomen Soft] : soft [FreeTextEntry1] : Left PleurX dressing C,D,I

## 2019-12-18 PROBLEM — J91.0 PLEURAL EFFUSION, MALIGNANT: Status: ACTIVE | Noted: 2019-09-04

## 2019-12-19 NOTE — CONSULT LETTER
[Courtesy Letter:] : I had the pleasure of seeing your patient, [unfilled], in my office today. [Please see my note below.] : Please see my note below. [Sincerely,] : Sincerely, [FreeTextEntry2] : Dr. Roman Leiva (Ref/Pulm)\par Dr. Zuñiga (PCP)\par Dr. Chong (Card)\par Dr. Landrum (Onc) \par  [FreeTextEntry3] : \par \par \par Ronaldo Londono MD, FACS \par , Division of Thoracic Surgery \par [facility] \par Chief, Thoracic Surgery \par [facility] \par Department of Cardiovascular & Thoracic Surgery \par  \par [facility] [employer] [facility]

## 2019-12-19 NOTE — PHYSICAL EXAM
[PERRL With Normal Accommodation] : pupils were equal in size, round, and reactive to light [Sclera] : the sclera and conjunctiva were normal [] : the neck was supple [Neck Appearance] : the appearance of the neck was normal [Respiration, Rhythm And Depth] : normal respiratory rhythm and effort [Heart Sounds] : normal S1 and S2 [Auscultation Breath Sounds / Voice Sounds] : lungs were clear to auscultation bilaterally [Murmurs] : no murmurs [Edema] : there was no peripheral edema [Abdomen Soft] : soft [Abdomen Tenderness] : non-tender [Skin Color & Pigmentation] : normal skin color and pigmentation [Abnormal Walk] : normal gait [No Focal Deficits] : no focal deficits [Skin Turgor] : normal skin turgor [Oriented To Time, Place, And Person] : oriented to person, place, and time [Affect] : the affect was normal [Mood] : the mood was normal [FreeTextEntry1] : left chest pleurx catheter

## 2019-12-19 NOTE — HISTORY OF PRESENT ILLNESS
[FreeTextEntry1] : Ms. Mascorro is a 54 year old female presenting for follow up S/P insertion of right MediPort catheter, left PleurX catheter placement on 9/6/19. Cytopathology was suspicious for metastatic adenocarcinoma. \par \par She is a non-smoker and has history of breast cancer dx'd in 2004 s/p lumpectomy, chemoRT and Tamoxifen until 2015. She was found to have mets to the bones and was started on different endocrine therapies. Prior to insertion of pleurx catheter, she was found to have left pleural effusion which she underwent thoracentesis x 2. Pleural fluid was positive for malignant cells consistent with breast origin. \par \par She reports currently receiving chemotherapy, 4 months completed and 2 months remaining.  She notes her pleurx catheter is drained weekly with minimal output (less than 5 cc) over the last two weeks .  CXR on 12/10/19 reveals small-moderate left sided pleural effusion.\par \par She denies any fever, chills, cough, shortness of breath, chest pain, hemoptysis, or recent illness.

## 2019-12-19 NOTE — H&P PST ADULT - NSICDXPROBLEM_GEN_ALL_CORE_FT
PROBLEM DIAGNOSES  Problem: Pleural effusion  Assessment and Plan: Patient is scheduled for removal of pleurx catheter for 12/20/2019. Pre-op instructions provided. Pt given verbal and writter instructions with teach back on chlorhexidine soap. Patient will take her own omeprazole in the morning of the procedure. Pt verbalized understanding with return demonstration. Case discussed with Dr. Barnes.

## 2019-12-19 NOTE — H&P PST ADULT - NEGATIVE OPHTHALMOLOGIC SYMPTOMS
no diplopia/no blurred vision L/no discharge L/no pain R/no blurred vision R/no discharge R/no pain L/no photophobia

## 2019-12-19 NOTE — ASSESSMENT
[FreeTextEntry1] : 54 year old female presenting for follow up S/P insertion of right MediPort catheter, left PleurX catheter placement on 9/6/19. Cytopathology was suspicious for metastatic adenocarcinoma. history of breast cancer, found to have left pleural effusion, pleural fluid was positive for malignant cells consistent with breast origin. \par \par Pleurx catheter is drained weekly with minimal output (less than 5 cc) over the last two weeks.  CXR on 12/10/19 reveals small-moderate left sided pleural effusion.\par \par I have reviewed the patient's medical records and diagnostic images during the time of this office visit, and I have made the following recommendation:  Removal of Pleurx Catheter to be performed later this week by my associate, Dr. Jakcson.  The risks, benefits, and alternatives to the procedure were discussed with the patient at length. She verbalized understanding and is in agreement with the above treatment plan.\par \par \par I personally performed the services described in the documentation, reviewed the documentation recorded by the scribe in my presence and it accurately and completely records my words and actions.\par \par I, Larisa Gonzalez, am scribing for and the presence of NIA Cohn the following sections HISTORY OF PRESENT ILLNESSES, PAST MEDICAL/FAMILY/SOCIAL HISTORY; REVIEW OF SYSTEMS; VITAL SIGNS; PHYSICAL EXAM; DISPOSITION.

## 2019-12-19 NOTE — H&P PST ADULT - HISTORY OF PRESENT ILLNESS
54 year old female presents to PST with diagnosis of Pleural Effusion, not elsewhere classified, scheduled for removal of pleurx catheter for 12/20/2019. Patient with history of breast cancer s/p lumpectomy 2005, s/p chemo and radiation, with pleural effusion treated with pleurx catheter placed 9/6/2019.

## 2019-12-19 NOTE — H&P PST ADULT - NSICDXPASTMEDICALHX_GEN_ALL_CORE_FT
PAST MEDICAL HISTORY:  Breast Cancer h/o chemo and radiation in 2005     Congenital Hip Dysplasia     GERD (Gastroesophageal Reflux Disease)     H/O pleural effusion Left treated 9/2019    H/O: Osteoarthritis     HPV (Human Papillomavirus) in 2004     Hyperlipidemia - allergic to all statin drugs - on no meds

## 2019-12-19 NOTE — H&P PST ADULT - MUSCULOSKELETAL
details… detailed exam ROM intact/no calf tenderness/normal strength/no joint warmth/no joint swelling/no joint erythema

## 2019-12-19 NOTE — H&P PST ADULT - NEGATIVE NEUROLOGICAL SYMPTOMS
no generalized seizures/no difficulty walking/no headache/no syncope/no vertigo/no transient paralysis/no weakness

## 2019-12-19 NOTE — H&P PST ADULT - NEGATIVE ENMT SYMPTOMS
no ear pain/no throat pain/no dysphagia/no tinnitus/no vertigo/no hearing difficulty/no sinus symptoms/no nose bleeds

## 2019-12-19 NOTE — H&P PST ADULT - RS GEN PE MLT RESP DETAILS PC
airway patent/clear to auscultation bilaterally/no chest wall tenderness/no rales/no rhonchi/no wheezes/breath sounds equal/respirations non-labored/good air movement

## 2019-12-19 NOTE — H&P PST ADULT - NSICDXPASTSURGICALHX_GEN_ALL_CORE_FT
PAST SURGICAL HISTORY:  Breast Cancer - chemoport insertion  and removal 2005 and 2006     Cryo ablation of cervix in 2006     h/o  Breast Lumpectomyn - left 2005     H/O pleural effusion S/P Pleurx catheter and medi-port placement 9/6/2019    H/O: Myomectomy 2003     History of Total Hip Replacement left 2003 right thr 2011

## 2019-12-20 NOTE — PROGRESS NOTE ADULT - SUBJECTIVE AND OBJECTIVE BOX
Patient hemodynamically stable in ASU with no complaints s/p removal of pleurX  CXR s/p removal of left pleurX catheter reviewed with radiologist Dr. Kearney.  Small left pleural effusion, no pneumothorax.    Patient to be discharged home as per Dr. Jackson

## 2019-12-20 NOTE — ASU PATIENT PROFILE, ADULT - PROVIDER NOTIFICATION
I have interviewed and examined the patient and reviewed the recent History and Physical.  There have been no changes to the recent H&P documentation. The surgical consent form has been signed. Last anticoagulant medication use was:na    Premedication taken for contrast allergy? No    Valium taken for oral sedation? No    Outpatient Medications Marked as Taking for the 6/21/19 encounter [facility] Encounter)   Medication Sig Dispense Refill    DULoxetine (CYMBALTA) 30 MG extended release capsule Take 1 capsule by mouth daily 90 capsule 2    ibuprofen (ADVIL;MOTRIN) 800 MG tablet Take 800 mg by mouth 2 times daily as needed for Pain      zaleplon (SONATA) 10 MG capsule Take 1 capsule by mouth nightly as needed (sleep). . 90 capsule 1    vitamin D (CHOLECALCIFEROL) 1000 UNIT TABS tablet Take 1,000 Units by mouth daily      magnesium (MAGNESIUM-OXIDE) 250 MG TABS tablet Take 250 mg by mouth daily      B Complex Vitamins (VITAMIN B COMPLEX PO) Take by mouth daily      BLACK COHOSH HOT FLASH RELIEF PO Take 1-2 tablets by mouth daily as needed      Calcium Carbonate-Vitamin D (CALCIUM 600 + D PO)   Take 1 tablet by mouth daily          The patient understands the planned operation and its associated risks and benefits and agrees to proceed.         Electronically signed by Barrie Chiang MD on 6/21/2019 at 9:32 AM Declines

## 2019-12-20 NOTE — ASU DISCHARGE PLAN (ADULT/PEDIATRIC) - CALL YOUR DOCTOR IF YOU HAVE ANY OF THE FOLLOWING:
Fever greater than (need to indicate Fahrenheit or Celsius)/Wound/Surgical Site with redness, or foul smelling discharge or pus/Pain not relieved by Medications/Bleeding that does not stop/Swelling that gets worse/or any other concerning symptom Fever greater than (need to indicate Fahrenheit or Celsius)/or any other concerning symptom/Nausea and vomiting that does not stop/Swelling that gets worse/Pain not relieved by Medications/Inability to tolerate liquids or foods/Wound/Surgical Site with redness, or foul smelling discharge or pus/Bleeding that does not stop

## 2019-12-20 NOTE — ASU PATIENT PROFILE, ADULT - PMH
Breast Cancer h/o chemo and radiation in 2005    Congenital Hip Dysplasia    GERD (Gastroesophageal Reflux Disease)    H/O pleural effusion  Left treated 9/2019  H/O: Osteoarthritis    HPV (Human Papillomavirus) in 2004    Hyperlipidemia - allergic to all statin drugs - on no meds

## 2019-12-20 NOTE — ASU DISCHARGE PLAN (ADULT/PEDIATRIC) - CARE PROVIDER_API CALL
Trey Jackson)  Surgery; Thoracic Surgery  [address] [place] [address], [place] [address]  Phone: [phone]  Fax: [phone]  Follow Up Time:

## 2019-12-20 NOTE — ASU PATIENT PROFILE, ADULT - PSH
Breast Cancer - chemoport insertion  and removal 2005 and 2006    Cryo ablation of cervix in 2006    h/o  Breast Lumpectomyn - left 2005    H/O pleural effusion  S/P Pleurx catheter and medi-port placement 9/6/2019  H/O: Myomectomy 2003    History of Total Hip Replacement left 2003  right thr 2011

## 2020-01-01 ENCOUNTER — TRANSCRIPTION ENCOUNTER (OUTPATIENT)
Age: 55
End: 2020-01-01

## 2020-01-01 ENCOUNTER — INPATIENT (INPATIENT)
Facility: HOSPITAL | Age: 55
LOS: 2 days | Discharge: HOME | DRG: 872 | End: 2020-07-05
Attending: INTERNAL MEDICINE | Admitting: INTERNAL MEDICINE
Payer: COMMERCIAL

## 2020-01-01 ENCOUNTER — INPATIENT (INPATIENT)
Facility: HOSPITAL | Age: 55
LOS: 6 days | Discharge: HOME | DRG: 871 | End: 2020-06-04
Attending: HOSPITALIST | Admitting: SPECIALIST
Payer: COMMERCIAL

## 2020-01-01 ENCOUNTER — RESULT REVIEW (OUTPATIENT)
Age: 55
End: 2020-01-01

## 2020-01-01 ENCOUNTER — INPATIENT (INPATIENT)
Facility: HOSPITAL | Age: 55
LOS: 10 days | DRG: 871 | End: 2020-09-09
Attending: FAMILY MEDICINE | Admitting: FAMILY MEDICINE
Payer: COMMERCIAL

## 2020-01-01 VITALS
WEIGHT: 160.06 LBS | RESPIRATION RATE: 30 BRPM | HEART RATE: 127 BPM | TEMPERATURE: 98 F | OXYGEN SATURATION: 97 % | HEIGHT: 61 IN | DIASTOLIC BLOOD PRESSURE: 49 MMHG | SYSTOLIC BLOOD PRESSURE: 56 MMHG

## 2020-01-01 VITALS
OXYGEN SATURATION: 97 % | HEART RATE: 78 BPM | TEMPERATURE: 98 F | RESPIRATION RATE: 18 BRPM | DIASTOLIC BLOOD PRESSURE: 68 MMHG | SYSTOLIC BLOOD PRESSURE: 97 MMHG

## 2020-01-01 VITALS
SYSTOLIC BLOOD PRESSURE: 120 MMHG | RESPIRATION RATE: 18 BRPM | DIASTOLIC BLOOD PRESSURE: 71 MMHG | OXYGEN SATURATION: 99 % | TEMPERATURE: 98 F | HEART RATE: 72 BPM

## 2020-01-01 VITALS
DIASTOLIC BLOOD PRESSURE: 57 MMHG | OXYGEN SATURATION: 92 % | SYSTOLIC BLOOD PRESSURE: 92 MMHG | RESPIRATION RATE: 20 BRPM | HEART RATE: 107 BPM | TEMPERATURE: 98 F

## 2020-01-01 VITALS
SYSTOLIC BLOOD PRESSURE: 130 MMHG | OXYGEN SATURATION: 95 % | TEMPERATURE: 100 F | DIASTOLIC BLOOD PRESSURE: 83 MMHG | HEART RATE: 125 BPM | RESPIRATION RATE: 20 BRPM | HEIGHT: 61 IN | WEIGHT: 160.06 LBS

## 2020-01-01 VITALS
SYSTOLIC BLOOD PRESSURE: 137 MMHG | RESPIRATION RATE: 20 BRPM | OXYGEN SATURATION: 93 % | HEART RATE: 112 BPM | HEIGHT: 61 IN | DIASTOLIC BLOOD PRESSURE: 76 MMHG | WEIGHT: 145.06 LBS | TEMPERATURE: 98 F

## 2020-01-01 DIAGNOSIS — K59.03 DRUG INDUCED CONSTIPATION: ICD-10-CM

## 2020-01-01 DIAGNOSIS — Z87.09 PERSONAL HISTORY OF OTHER DISEASES OF THE RESPIRATORY SYSTEM: Chronic | ICD-10-CM

## 2020-01-01 DIAGNOSIS — R09.89 OTHER SPECIFIED SYMPTOMS AND SIGNS INVOLVING THE CIRCULATORY AND RESPIRATORY SYSTEMS: ICD-10-CM

## 2020-01-01 DIAGNOSIS — Z29.9 ENCOUNTER FOR PROPHYLACTIC MEASURES, UNSPECIFIED: ICD-10-CM

## 2020-01-01 DIAGNOSIS — A41.9 SEPSIS, UNSPECIFIED ORGANISM: ICD-10-CM

## 2020-01-01 DIAGNOSIS — C50.919 MALIGNANT NEOPLASM OF UNSPECIFIED SITE OF UNSPECIFIED FEMALE BREAST: ICD-10-CM

## 2020-01-01 DIAGNOSIS — K76.9 LIVER DISEASE, UNSPECIFIED: ICD-10-CM

## 2020-01-01 DIAGNOSIS — F41.9 ANXIETY DISORDER, UNSPECIFIED: ICD-10-CM

## 2020-01-01 DIAGNOSIS — Z71.89 OTHER SPECIFIED COUNSELING: ICD-10-CM

## 2020-01-01 DIAGNOSIS — K21.9 GASTRO-ESOPHAGEAL REFLUX DISEASE WITHOUT ESOPHAGITIS: ICD-10-CM

## 2020-01-01 DIAGNOSIS — R10.84 GENERALIZED ABDOMINAL PAIN: ICD-10-CM

## 2020-01-01 DIAGNOSIS — R07.81 PLEURODYNIA: ICD-10-CM

## 2020-01-01 DIAGNOSIS — R06.00 DYSPNEA, UNSPECIFIED: ICD-10-CM

## 2020-01-01 DIAGNOSIS — R18.0 MALIGNANT ASCITES: ICD-10-CM

## 2020-01-01 DIAGNOSIS — E83.39 OTHER DISORDERS OF PHOSPHORUS METABOLISM: ICD-10-CM

## 2020-01-01 DIAGNOSIS — R74.0 NONSPECIFIC ELEVATION OF LEVELS OF TRANSAMINASE AND LACTIC ACID DEHYDROGENASE [LDH]: ICD-10-CM

## 2020-01-01 DIAGNOSIS — L03.90 CELLULITIS, UNSPECIFIED: ICD-10-CM

## 2020-01-01 DIAGNOSIS — E44.0 MODERATE PROTEIN-CALORIE MALNUTRITION: ICD-10-CM

## 2020-01-01 DIAGNOSIS — Z51.5 ENCOUNTER FOR PALLIATIVE CARE: ICD-10-CM

## 2020-01-01 DIAGNOSIS — R41.82 ALTERED MENTAL STATUS, UNSPECIFIED: ICD-10-CM

## 2020-01-01 DIAGNOSIS — G89.3 NEOPLASM RELATED PAIN (ACUTE) (CHRONIC): ICD-10-CM

## 2020-01-01 DIAGNOSIS — G93.40 ENCEPHALOPATHY, UNSPECIFIED: ICD-10-CM

## 2020-01-01 DIAGNOSIS — E87.1 HYPO-OSMOLALITY AND HYPONATREMIA: ICD-10-CM

## 2020-01-01 DIAGNOSIS — Z02.9 ENCOUNTER FOR ADMINISTRATIVE EXAMINATIONS, UNSPECIFIED: ICD-10-CM

## 2020-01-01 LAB
-  CEFTRIAXONE: SIGNIFICANT CHANGE UP
-  PENICILLIN: SIGNIFICANT CHANGE UP
-  VANCOMYCIN: SIGNIFICANT CHANGE UP
ALBUMIN FLD-MCNC: 0.6 G/DL — SIGNIFICANT CHANGE UP
ALBUMIN SERPL ELPH-MCNC: 1.3 G/DL — LOW (ref 3.3–5)
ALBUMIN SERPL ELPH-MCNC: 1.8 G/DL — LOW (ref 3.3–5)
ALBUMIN SERPL ELPH-MCNC: 1.8 G/DL — LOW (ref 3.3–5)
ALBUMIN SERPL ELPH-MCNC: 1.9 G/DL — LOW (ref 3.3–5)
ALBUMIN SERPL ELPH-MCNC: 1.9 G/DL — LOW (ref 3.3–5)
ALBUMIN SERPL ELPH-MCNC: 2.1 G/DL — LOW (ref 3.3–5)
ALBUMIN SERPL ELPH-MCNC: 2.2 G/DL — LOW (ref 3.3–5)
ALBUMIN SERPL ELPH-MCNC: 2.3 G/DL — LOW (ref 3.3–5)
ALBUMIN SERPL ELPH-MCNC: 2.4 G/DL — LOW (ref 3.3–5)
ALBUMIN SERPL ELPH-MCNC: 2.6 G/DL — LOW (ref 3.3–5)
ALBUMIN SERPL ELPH-MCNC: 2.8 G/DL — LOW (ref 3.3–5)
ALBUMIN SERPL ELPH-MCNC: 2.9 G/DL — LOW (ref 3.3–5)
ALBUMIN SERPL ELPH-MCNC: 3.3 G/DL — SIGNIFICANT CHANGE UP (ref 3.3–5)
ALBUMIN SERPL ELPH-MCNC: 3.5 G/DL — SIGNIFICANT CHANGE UP (ref 3.3–5)
ALBUMIN SERPL ELPH-MCNC: 3.6 G/DL — SIGNIFICANT CHANGE UP (ref 3.3–5)
ALBUMIN SERPL ELPH-MCNC: 3.9 G/DL — SIGNIFICANT CHANGE UP (ref 3.3–5)
ALP SERPL-CCNC: 104 U/L — SIGNIFICANT CHANGE UP (ref 40–120)
ALP SERPL-CCNC: 1056 U/L — HIGH (ref 40–120)
ALP SERPL-CCNC: 1059 U/L — HIGH (ref 40–120)
ALP SERPL-CCNC: 1092 U/L — HIGH (ref 40–120)
ALP SERPL-CCNC: 1121 U/L — HIGH (ref 40–120)
ALP SERPL-CCNC: 121 U/L — HIGH (ref 40–120)
ALP SERPL-CCNC: 1242 U/L — HIGH (ref 40–120)
ALP SERPL-CCNC: 136 U/L — HIGH (ref 40–120)
ALP SERPL-CCNC: 212 U/L — HIGH (ref 40–120)
ALP SERPL-CCNC: 216 U/L — HIGH (ref 40–120)
ALP SERPL-CCNC: 230 U/L — HIGH (ref 40–120)
ALP SERPL-CCNC: 237 U/L — HIGH (ref 40–120)
ALP SERPL-CCNC: 351 U/L — HIGH (ref 40–120)
ALP SERPL-CCNC: 567 U/L — HIGH (ref 40–120)
ALP SERPL-CCNC: 570 U/L — HIGH (ref 40–120)
ALP SERPL-CCNC: 585 U/L — HIGH (ref 40–120)
ALP SERPL-CCNC: 653 U/L — HIGH (ref 40–120)
ALP SERPL-CCNC: 732 U/L — HIGH (ref 40–120)
ALT FLD-CCNC: 100 U/L — HIGH (ref 10–45)
ALT FLD-CCNC: 101 U/L — HIGH (ref 10–45)
ALT FLD-CCNC: 136 U/L — HIGH (ref 10–45)
ALT FLD-CCNC: 151 U/L — HIGH (ref 10–45)
ALT FLD-CCNC: 162 U/L — HIGH (ref 10–45)
ALT FLD-CCNC: 187 U/L — HIGH (ref 10–45)
ALT FLD-CCNC: 204 U/L — HIGH (ref 10–45)
ALT FLD-CCNC: 222 U/L — HIGH (ref 10–45)
ALT FLD-CCNC: 34 U/L — SIGNIFICANT CHANGE UP (ref 10–45)
ALT FLD-CCNC: 37 U/L — SIGNIFICANT CHANGE UP (ref 10–45)
ALT FLD-CCNC: 38 U/L — SIGNIFICANT CHANGE UP (ref 10–45)
ALT FLD-CCNC: 38 U/L — SIGNIFICANT CHANGE UP (ref 10–45)
ALT FLD-CCNC: 42 U/L — SIGNIFICANT CHANGE UP (ref 10–45)
ALT FLD-CCNC: 45 U/L — SIGNIFICANT CHANGE UP (ref 10–45)
ALT FLD-CCNC: 57 U/L — HIGH (ref 10–45)
ALT FLD-CCNC: 59 U/L — HIGH (ref 10–45)
ALT FLD-CCNC: 95 U/L — HIGH (ref 10–45)
ALT FLD-CCNC: 96 U/L — HIGH (ref 10–45)
AMMONIA BLD-MCNC: 86 UMOL/L — HIGH (ref 11–55)
ANION GAP SERPL CALC-SCNC: 10 MMOL/L — SIGNIFICANT CHANGE UP (ref 5–17)
ANION GAP SERPL CALC-SCNC: 10 MMOL/L — SIGNIFICANT CHANGE UP (ref 5–17)
ANION GAP SERPL CALC-SCNC: 11 MMOL/L — SIGNIFICANT CHANGE UP (ref 5–17)
ANION GAP SERPL CALC-SCNC: 12 MMOL/L — SIGNIFICANT CHANGE UP (ref 5–17)
ANION GAP SERPL CALC-SCNC: 13 MMOL/L — SIGNIFICANT CHANGE UP (ref 5–17)
ANION GAP SERPL CALC-SCNC: 14 MMOL/L — SIGNIFICANT CHANGE UP (ref 5–17)
ANION GAP SERPL CALC-SCNC: 14 MMOL/L — SIGNIFICANT CHANGE UP (ref 5–17)
ANION GAP SERPL CALC-SCNC: 15 MMOL/L — SIGNIFICANT CHANGE UP (ref 5–17)
ANION GAP SERPL CALC-SCNC: 16 MMOL/L — SIGNIFICANT CHANGE UP (ref 5–17)
ANION GAP SERPL CALC-SCNC: 16 MMOL/L — SIGNIFICANT CHANGE UP (ref 5–17)
ANION GAP SERPL CALC-SCNC: 17 MMOL/L — SIGNIFICANT CHANGE UP (ref 5–17)
ANION GAP SERPL CALC-SCNC: 24 MMOL/L — HIGH (ref 5–17)
ANION GAP SERPL CALC-SCNC: 7 MMOL/L — SIGNIFICANT CHANGE UP (ref 5–17)
ANION GAP SERPL CALC-SCNC: 7 MMOL/L — SIGNIFICANT CHANGE UP (ref 5–17)
ANION GAP SERPL CALC-SCNC: 8 MMOL/L — SIGNIFICANT CHANGE UP (ref 5–17)
APPEARANCE UR: ABNORMAL
APPEARANCE UR: CLEAR — SIGNIFICANT CHANGE UP
APTT BLD: 21.6 SEC — LOW (ref 27.5–35.5)
APTT BLD: 26.9 SEC — LOW (ref 27.5–35.5)
APTT BLD: 28.1 SEC — SIGNIFICANT CHANGE UP (ref 27.5–35.5)
APTT BLD: 29.5 SEC — SIGNIFICANT CHANGE UP (ref 27.5–36.3)
APTT BLD: 30.2 SEC — SIGNIFICANT CHANGE UP (ref 27.5–35.5)
APTT BLD: 33.6 SEC — SIGNIFICANT CHANGE UP (ref 27.5–35.5)
APTT BLD: 34.9 SEC — SIGNIFICANT CHANGE UP (ref 27.5–36.3)
APTT BLD: 36.7 SEC — HIGH (ref 27.5–36.3)
APTT BLD: 52.6 SEC — HIGH (ref 27.5–36.3)
AST SERPL-CCNC: 1105 U/L — HIGH (ref 10–40)
AST SERPL-CCNC: 148 U/L — HIGH (ref 10–40)
AST SERPL-CCNC: 149 U/L — HIGH (ref 10–40)
AST SERPL-CCNC: 154 U/L — HIGH (ref 10–40)
AST SERPL-CCNC: 177 U/L — HIGH (ref 10–40)
AST SERPL-CCNC: 196 U/L — HIGH (ref 10–40)
AST SERPL-CCNC: 238 U/L — HIGH (ref 10–40)
AST SERPL-CCNC: 250 U/L — HIGH (ref 10–40)
AST SERPL-CCNC: 349 U/L — HIGH (ref 10–40)
AST SERPL-CCNC: 384 U/L — HIGH (ref 10–40)
AST SERPL-CCNC: 387 U/L — HIGH (ref 10–40)
AST SERPL-CCNC: 417 U/L — HIGH (ref 10–40)
AST SERPL-CCNC: 461 U/L — HIGH (ref 10–40)
AST SERPL-CCNC: 486 U/L — HIGH (ref 10–40)
AST SERPL-CCNC: 498 U/L — HIGH (ref 10–40)
AST SERPL-CCNC: 637 U/L — HIGH (ref 10–40)
AST SERPL-CCNC: 663 U/L — HIGH (ref 10–40)
AST SERPL-CCNC: 993 U/L — HIGH (ref 10–40)
B PERT IGG+IGM PNL SER: ABNORMAL
BACTERIA # UR AUTO: NEGATIVE — SIGNIFICANT CHANGE UP
BASE EXCESS BLDV CALC-SCNC: -0.2 MMOL/L — SIGNIFICANT CHANGE UP (ref -2–2)
BASE EXCESS BLDV CALC-SCNC: -5.2 MMOL/L — LOW (ref -2–2)
BASE EXCESS BLDV CALC-SCNC: 2.6 MMOL/L — HIGH (ref -2–2)
BASOPHILS # BLD AUTO: 0 K/UL — SIGNIFICANT CHANGE UP (ref 0–0.2)
BASOPHILS # BLD AUTO: 0.02 K/UL — SIGNIFICANT CHANGE UP (ref 0–0.2)
BASOPHILS # BLD AUTO: 0.02 K/UL — SIGNIFICANT CHANGE UP (ref 0–0.2)
BASOPHILS NFR BLD AUTO: 0 % — SIGNIFICANT CHANGE UP (ref 0–2)
BASOPHILS NFR BLD AUTO: 0.9 % — SIGNIFICANT CHANGE UP (ref 0–2)
BASOPHILS NFR BLD AUTO: 1.3 % — SIGNIFICANT CHANGE UP (ref 0–2)
BILIRUB SERPL-MCNC: 1.1 MG/DL — SIGNIFICANT CHANGE UP (ref 0.2–1.2)
BILIRUB SERPL-MCNC: 1.3 MG/DL — HIGH (ref 0.2–1.2)
BILIRUB SERPL-MCNC: 1.4 MG/DL — HIGH (ref 0.2–1.2)
BILIRUB SERPL-MCNC: 1.6 MG/DL — HIGH (ref 0.2–1.2)
BILIRUB SERPL-MCNC: 1.7 MG/DL — HIGH (ref 0.2–1.2)
BILIRUB SERPL-MCNC: 1.8 MG/DL — HIGH (ref 0.2–1.2)
BILIRUB SERPL-MCNC: 1.8 MG/DL — HIGH (ref 0.2–1.2)
BILIRUB SERPL-MCNC: 11.1 MG/DL — HIGH (ref 0.2–1.2)
BILIRUB SERPL-MCNC: 12.5 MG/DL — HIGH (ref 0.2–1.2)
BILIRUB SERPL-MCNC: 2 MG/DL — HIGH (ref 0.2–1.2)
BILIRUB SERPL-MCNC: 3.9 MG/DL — HIGH (ref 0.2–1.2)
BILIRUB SERPL-MCNC: 4.4 MG/DL — HIGH (ref 0.2–1.2)
BILIRUB SERPL-MCNC: 4.4 MG/DL — HIGH (ref 0.2–1.2)
BILIRUB SERPL-MCNC: 4.5 MG/DL — HIGH (ref 0.2–1.2)
BILIRUB SERPL-MCNC: 5.2 MG/DL — HIGH (ref 0.2–1.2)
BILIRUB SERPL-MCNC: 6 MG/DL — HIGH (ref 0.2–1.2)
BILIRUB SERPL-MCNC: 8.5 MG/DL — HIGH (ref 0.2–1.2)
BILIRUB SERPL-MCNC: 8.9 MG/DL — HIGH (ref 0.2–1.2)
BILIRUB UR-MCNC: ABNORMAL
BILIRUB UR-MCNC: NEGATIVE — SIGNIFICANT CHANGE UP
BLD GP AB SCN SERPL QL: NEGATIVE — SIGNIFICANT CHANGE UP
BLD GP AB SCN SERPL QL: NEGATIVE — SIGNIFICANT CHANGE UP
BUN SERPL-MCNC: 10 MG/DL — SIGNIFICANT CHANGE UP (ref 7–23)
BUN SERPL-MCNC: 12 MG/DL — SIGNIFICANT CHANGE UP (ref 7–23)
BUN SERPL-MCNC: 13 MG/DL — SIGNIFICANT CHANGE UP (ref 7–23)
BUN SERPL-MCNC: 14 MG/DL — SIGNIFICANT CHANGE UP (ref 7–23)
BUN SERPL-MCNC: 15 MG/DL — SIGNIFICANT CHANGE UP (ref 7–23)
BUN SERPL-MCNC: 17 MG/DL — SIGNIFICANT CHANGE UP (ref 7–23)
BUN SERPL-MCNC: 18 MG/DL — SIGNIFICANT CHANGE UP (ref 7–23)
BUN SERPL-MCNC: 22 MG/DL — SIGNIFICANT CHANGE UP (ref 7–23)
BUN SERPL-MCNC: 24 MG/DL — HIGH (ref 7–23)
BUN SERPL-MCNC: 5 MG/DL — LOW (ref 7–23)
BUN SERPL-MCNC: 6 MG/DL — LOW (ref 7–23)
BUN SERPL-MCNC: 6 MG/DL — LOW (ref 7–23)
BUN SERPL-MCNC: 7 MG/DL — SIGNIFICANT CHANGE UP (ref 7–23)
BUN SERPL-MCNC: 7 MG/DL — SIGNIFICANT CHANGE UP (ref 7–23)
BUN SERPL-MCNC: 8 MG/DL — SIGNIFICANT CHANGE UP (ref 7–23)
BUN SERPL-MCNC: 9 MG/DL — SIGNIFICANT CHANGE UP (ref 7–23)
BUN SERPL-MCNC: 9 MG/DL — SIGNIFICANT CHANGE UP (ref 7–23)
BUN SERPL-MCNC: <4 MG/DL — LOW (ref 7–23)
CA-I SERPL-SCNC: 1 MMOL/L — LOW (ref 1.12–1.3)
CA-I SERPL-SCNC: 1.01 MMOL/L — LOW (ref 1.12–1.3)
CA-I SERPL-SCNC: 1.11 MMOL/L — LOW (ref 1.12–1.3)
CALCIUM SERPL-MCNC: 4.4 MG/DL — CRITICAL LOW (ref 8.4–10.5)
CALCIUM SERPL-MCNC: 6.9 MG/DL — LOW (ref 8.4–10.5)
CALCIUM SERPL-MCNC: 7.3 MG/DL — LOW (ref 8.4–10.5)
CALCIUM SERPL-MCNC: 7.4 MG/DL — LOW (ref 8.4–10.5)
CALCIUM SERPL-MCNC: 7.5 MG/DL — LOW (ref 8.4–10.5)
CALCIUM SERPL-MCNC: 7.6 MG/DL — LOW (ref 8.4–10.5)
CALCIUM SERPL-MCNC: 7.7 MG/DL — LOW (ref 8.4–10.5)
CALCIUM SERPL-MCNC: 7.8 MG/DL — LOW (ref 8.4–10.5)
CALCIUM SERPL-MCNC: 7.9 MG/DL — LOW (ref 8.4–10.5)
CALCIUM SERPL-MCNC: 7.9 MG/DL — LOW (ref 8.4–10.5)
CALCIUM SERPL-MCNC: 8 MG/DL — LOW (ref 8.4–10.5)
CALCIUM SERPL-MCNC: 8.2 MG/DL — LOW (ref 8.4–10.5)
CALCIUM SERPL-MCNC: 8.2 MG/DL — LOW (ref 8.4–10.5)
CALCIUM SERPL-MCNC: 8.4 MG/DL — SIGNIFICANT CHANGE UP (ref 8.4–10.5)
CALCIUM SERPL-MCNC: 8.4 MG/DL — SIGNIFICANT CHANGE UP (ref 8.4–10.5)
CALCIUM SERPL-MCNC: 8.5 MG/DL — SIGNIFICANT CHANGE UP (ref 8.4–10.5)
CHLORIDE BLDV-SCNC: 101 MMOL/L — SIGNIFICANT CHANGE UP (ref 96–108)
CHLORIDE BLDV-SCNC: 102 MMOL/L — SIGNIFICANT CHANGE UP (ref 96–108)
CHLORIDE BLDV-SCNC: 103 MMOL/L — SIGNIFICANT CHANGE UP (ref 96–108)
CHLORIDE SERPL-SCNC: 100 MMOL/L — SIGNIFICANT CHANGE UP (ref 96–108)
CHLORIDE SERPL-SCNC: 101 MMOL/L — SIGNIFICANT CHANGE UP (ref 96–108)
CHLORIDE SERPL-SCNC: 101 MMOL/L — SIGNIFICANT CHANGE UP (ref 96–108)
CHLORIDE SERPL-SCNC: 102 MMOL/L — SIGNIFICANT CHANGE UP (ref 96–108)
CHLORIDE SERPL-SCNC: 102 MMOL/L — SIGNIFICANT CHANGE UP (ref 96–108)
CHLORIDE SERPL-SCNC: 104 MMOL/L — SIGNIFICANT CHANGE UP (ref 96–108)
CHLORIDE SERPL-SCNC: 104 MMOL/L — SIGNIFICANT CHANGE UP (ref 96–108)
CHLORIDE SERPL-SCNC: 105 MMOL/L — SIGNIFICANT CHANGE UP (ref 96–108)
CHLORIDE SERPL-SCNC: 105 MMOL/L — SIGNIFICANT CHANGE UP (ref 96–108)
CHLORIDE SERPL-SCNC: 106 MMOL/L — SIGNIFICANT CHANGE UP (ref 96–108)
CHLORIDE SERPL-SCNC: 106 MMOL/L — SIGNIFICANT CHANGE UP (ref 96–108)
CHLORIDE SERPL-SCNC: 107 MMOL/L — SIGNIFICANT CHANGE UP (ref 96–108)
CHLORIDE SERPL-SCNC: 82 MMOL/L — LOW (ref 96–108)
CHLORIDE SERPL-SCNC: 84 MMOL/L — LOW (ref 96–108)
CHLORIDE SERPL-SCNC: 85 MMOL/L — LOW (ref 96–108)
CHLORIDE SERPL-SCNC: 85 MMOL/L — LOW (ref 96–108)
CHLORIDE SERPL-SCNC: 86 MMOL/L — LOW (ref 96–108)
CHLORIDE SERPL-SCNC: 87 MMOL/L — LOW (ref 96–108)
CHLORIDE SERPL-SCNC: 88 MMOL/L — LOW (ref 96–108)
CHLORIDE SERPL-SCNC: 91 MMOL/L — LOW (ref 96–108)
CHLORIDE SERPL-SCNC: 92 MMOL/L — LOW (ref 96–108)
CHLORIDE SERPL-SCNC: 94 MMOL/L — LOW (ref 96–108)
CHLORIDE SERPL-SCNC: 95 MMOL/L — LOW (ref 96–108)
CHLORIDE SERPL-SCNC: 95 MMOL/L — LOW (ref 96–108)
CHLORIDE SERPL-SCNC: 98 MMOL/L — SIGNIFICANT CHANGE UP (ref 96–108)
CHLORIDE UR-SCNC: <35 MMOL/L — SIGNIFICANT CHANGE UP
CK SERPL-CCNC: 779 U/L — HIGH (ref 25–170)
CO2 BLDV-SCNC: 18 MMOL/L — LOW (ref 22–30)
CO2 BLDV-SCNC: 24 MMOL/L — SIGNIFICANT CHANGE UP (ref 22–30)
CO2 BLDV-SCNC: 28 MMOL/L — SIGNIFICANT CHANGE UP (ref 22–30)
CO2 SERPL-SCNC: 14 MMOL/L — LOW (ref 22–31)
CO2 SERPL-SCNC: 14 MMOL/L — LOW (ref 22–31)
CO2 SERPL-SCNC: 15 MMOL/L — LOW (ref 22–31)
CO2 SERPL-SCNC: 18 MMOL/L — LOW (ref 22–31)
CO2 SERPL-SCNC: 19 MMOL/L — LOW (ref 22–31)
CO2 SERPL-SCNC: 22 MMOL/L — SIGNIFICANT CHANGE UP (ref 22–31)
CO2 SERPL-SCNC: 22 MMOL/L — SIGNIFICANT CHANGE UP (ref 22–31)
CO2 SERPL-SCNC: 23 MMOL/L — SIGNIFICANT CHANGE UP (ref 22–31)
CO2 SERPL-SCNC: 24 MMOL/L — SIGNIFICANT CHANGE UP (ref 22–31)
CO2 SERPL-SCNC: 25 MMOL/L — SIGNIFICANT CHANGE UP (ref 22–31)
CO2 SERPL-SCNC: 27 MMOL/L — SIGNIFICANT CHANGE UP (ref 22–31)
CO2 SERPL-SCNC: 29 MMOL/L — SIGNIFICANT CHANGE UP (ref 22–31)
COLOR FLD: YELLOW — SIGNIFICANT CHANGE UP
COLOR SPEC: SIGNIFICANT CHANGE UP
COLOR SPEC: YELLOW — SIGNIFICANT CHANGE UP
COMMENT - FLUIDS: SIGNIFICANT CHANGE UP
CREAT ?TM UR-MCNC: 119 MG/DL — SIGNIFICANT CHANGE UP
CREAT SERPL-MCNC: 0.32 MG/DL — LOW (ref 0.5–1.3)
CREAT SERPL-MCNC: 0.37 MG/DL — LOW (ref 0.5–1.3)
CREAT SERPL-MCNC: 0.38 MG/DL — LOW (ref 0.5–1.3)
CREAT SERPL-MCNC: 0.39 MG/DL — LOW (ref 0.5–1.3)
CREAT SERPL-MCNC: 0.4 MG/DL — LOW (ref 0.5–1.3)
CREAT SERPL-MCNC: 0.41 MG/DL — LOW (ref 0.5–1.3)
CREAT SERPL-MCNC: 0.44 MG/DL — LOW (ref 0.5–1.3)
CREAT SERPL-MCNC: 0.45 MG/DL — LOW (ref 0.5–1.3)
CREAT SERPL-MCNC: 0.47 MG/DL — LOW (ref 0.5–1.3)
CREAT SERPL-MCNC: 0.48 MG/DL — LOW (ref 0.5–1.3)
CREAT SERPL-MCNC: 0.48 MG/DL — LOW (ref 0.5–1.3)
CREAT SERPL-MCNC: 0.53 MG/DL — SIGNIFICANT CHANGE UP (ref 0.5–1.3)
CREAT SERPL-MCNC: 0.53 MG/DL — SIGNIFICANT CHANGE UP (ref 0.5–1.3)
CREAT SERPL-MCNC: 0.54 MG/DL — SIGNIFICANT CHANGE UP (ref 0.5–1.3)
CREAT SERPL-MCNC: 0.55 MG/DL — SIGNIFICANT CHANGE UP (ref 0.5–1.3)
CREAT SERPL-MCNC: 0.56 MG/DL — SIGNIFICANT CHANGE UP (ref 0.5–1.3)
CREAT SERPL-MCNC: 0.57 MG/DL — SIGNIFICANT CHANGE UP (ref 0.5–1.3)
CREAT SERPL-MCNC: 0.63 MG/DL — SIGNIFICANT CHANGE UP (ref 0.5–1.3)
CREAT SERPL-MCNC: 0.65 MG/DL — SIGNIFICANT CHANGE UP (ref 0.5–1.3)
CREAT SERPL-MCNC: 0.66 MG/DL — SIGNIFICANT CHANGE UP (ref 0.5–1.3)
CREAT SERPL-MCNC: 0.74 MG/DL — SIGNIFICANT CHANGE UP (ref 0.5–1.3)
CREAT SERPL-MCNC: 1.09 MG/DL — SIGNIFICANT CHANGE UP (ref 0.5–1.3)
CREAT SERPL-MCNC: 1.34 MG/DL — HIGH (ref 0.5–1.3)
CULTURE RESULTS: SIGNIFICANT CHANGE UP
DIFF PNL FLD: ABNORMAL
DIFF PNL FLD: ABNORMAL
DIFF PNL FLD: NEGATIVE — SIGNIFICANT CHANGE UP
DIFF PNL FLD: NEGATIVE — SIGNIFICANT CHANGE UP
EOSINOPHIL # BLD AUTO: 0 K/UL — SIGNIFICANT CHANGE UP (ref 0–0.5)
EOSINOPHIL # BLD AUTO: 0.02 K/UL — SIGNIFICANT CHANGE UP (ref 0–0.5)
EOSINOPHIL # BLD AUTO: 0.04 K/UL — SIGNIFICANT CHANGE UP (ref 0–0.5)
EOSINOPHIL NFR BLD AUTO: 0 % — SIGNIFICANT CHANGE UP (ref 0–6)
EOSINOPHIL NFR BLD AUTO: 0.9 % — SIGNIFICANT CHANGE UP (ref 0–6)
EOSINOPHIL NFR BLD AUTO: 1 % — SIGNIFICANT CHANGE UP (ref 0–6)
EPI CELLS # UR: 2 /HPF — SIGNIFICANT CHANGE UP
ERYTHROCYTE [SEDIMENTATION RATE] IN BLOOD: 28 MM/HR — HIGH (ref 0–20)
FLUID INTAKE SUBSTANCE CLASS: SIGNIFICANT CHANGE UP
FLUID SEGMENTED GRANULOCYTES: 63 % — SIGNIFICANT CHANGE UP
GAS PNL BLDV: 127 MMOL/L — LOW (ref 135–145)
GAS PNL BLDV: 130 MMOL/L — LOW (ref 135–145)
GAS PNL BLDV: 131 MMOL/L — LOW (ref 135–145)
GAS PNL BLDV: SIGNIFICANT CHANGE UP
GGT SERPL-CCNC: 671 U/L — HIGH (ref 8–40)
GLUCOSE BLDC GLUCOMTR-MCNC: 136 MG/DL — HIGH (ref 70–99)
GLUCOSE BLDV-MCNC: 102 MG/DL — HIGH (ref 70–99)
GLUCOSE BLDV-MCNC: 75 MG/DL — SIGNIFICANT CHANGE UP (ref 70–99)
GLUCOSE BLDV-MCNC: 93 MG/DL — SIGNIFICANT CHANGE UP (ref 70–99)
GLUCOSE FLD-MCNC: 107 MG/DL — SIGNIFICANT CHANGE UP
GLUCOSE SERPL-MCNC: 101 MG/DL — HIGH (ref 70–99)
GLUCOSE SERPL-MCNC: 103 MG/DL — HIGH (ref 70–99)
GLUCOSE SERPL-MCNC: 103 MG/DL — HIGH (ref 70–99)
GLUCOSE SERPL-MCNC: 114 MG/DL — HIGH (ref 70–99)
GLUCOSE SERPL-MCNC: 117 MG/DL — HIGH (ref 70–99)
GLUCOSE SERPL-MCNC: 119 MG/DL — HIGH (ref 70–99)
GLUCOSE SERPL-MCNC: 120 MG/DL — HIGH (ref 70–99)
GLUCOSE SERPL-MCNC: 161 MG/DL — HIGH (ref 70–99)
GLUCOSE SERPL-MCNC: 383 MG/DL — HIGH (ref 70–99)
GLUCOSE SERPL-MCNC: 61 MG/DL — LOW (ref 70–99)
GLUCOSE SERPL-MCNC: 63 MG/DL — LOW (ref 70–99)
GLUCOSE SERPL-MCNC: 73 MG/DL — SIGNIFICANT CHANGE UP (ref 70–99)
GLUCOSE SERPL-MCNC: 75 MG/DL — SIGNIFICANT CHANGE UP (ref 70–99)
GLUCOSE SERPL-MCNC: 76 MG/DL — SIGNIFICANT CHANGE UP (ref 70–99)
GLUCOSE SERPL-MCNC: 77 MG/DL — SIGNIFICANT CHANGE UP (ref 70–99)
GLUCOSE SERPL-MCNC: 80 MG/DL — SIGNIFICANT CHANGE UP (ref 70–99)
GLUCOSE SERPL-MCNC: 80 MG/DL — SIGNIFICANT CHANGE UP (ref 70–99)
GLUCOSE SERPL-MCNC: 82 MG/DL — SIGNIFICANT CHANGE UP (ref 70–99)
GLUCOSE SERPL-MCNC: 82 MG/DL — SIGNIFICANT CHANGE UP (ref 70–99)
GLUCOSE SERPL-MCNC: 86 MG/DL — SIGNIFICANT CHANGE UP (ref 70–99)
GLUCOSE SERPL-MCNC: 89 MG/DL — SIGNIFICANT CHANGE UP (ref 70–99)
GLUCOSE SERPL-MCNC: 94 MG/DL — SIGNIFICANT CHANGE UP (ref 70–99)
GLUCOSE SERPL-MCNC: 95 MG/DL — SIGNIFICANT CHANGE UP (ref 70–99)
GLUCOSE SERPL-MCNC: 97 MG/DL — SIGNIFICANT CHANGE UP (ref 70–99)
GLUCOSE SERPL-MCNC: 99 MG/DL — SIGNIFICANT CHANGE UP (ref 70–99)
GLUCOSE UR QL: NEGATIVE — SIGNIFICANT CHANGE UP
GRAM STN FLD: SIGNIFICANT CHANGE UP
HAV IGM SER-ACNC: SIGNIFICANT CHANGE UP
HBV CORE IGM SER-ACNC: SIGNIFICANT CHANGE UP
HBV SURFACE AG SER-ACNC: SIGNIFICANT CHANGE UP
HCO3 BLDV-SCNC: 18 MMOL/L — LOW (ref 21–29)
HCO3 BLDV-SCNC: 23 MMOL/L — SIGNIFICANT CHANGE UP (ref 21–29)
HCO3 BLDV-SCNC: 27 MMOL/L — SIGNIFICANT CHANGE UP (ref 21–29)
HCT VFR BLD CALC: 22.2 % — LOW (ref 34.5–45)
HCT VFR BLD CALC: 23.8 % — LOW (ref 34.5–45)
HCT VFR BLD CALC: 25.3 % — LOW (ref 34.5–45)
HCT VFR BLD CALC: 26.9 % — LOW (ref 34.5–45)
HCT VFR BLD CALC: 28.7 % — LOW (ref 34.5–45)
HCT VFR BLD CALC: 30.1 % — LOW (ref 34.5–45)
HCT VFR BLD CALC: 30.2 % — LOW (ref 34.5–45)
HCT VFR BLD CALC: 30.8 % — LOW (ref 34.5–45)
HCT VFR BLD CALC: 31 % — LOW (ref 34.5–45)
HCT VFR BLD CALC: 31.3 % — LOW (ref 34.5–45)
HCT VFR BLD CALC: 31.5 % — LOW (ref 34.5–45)
HCT VFR BLD CALC: 32.2 % — LOW (ref 34.5–45)
HCT VFR BLD CALC: 32.3 % — LOW (ref 34.5–45)
HCT VFR BLD CALC: 32.7 % — LOW (ref 34.5–45)
HCT VFR BLD CALC: 32.7 % — LOW (ref 34.5–45)
HCT VFR BLD CALC: 32.9 % — LOW (ref 34.5–45)
HCT VFR BLD CALC: 33.4 % — LOW (ref 34.5–45)
HCT VFR BLD CALC: 34.9 % — SIGNIFICANT CHANGE UP (ref 34.5–45)
HCT VFR BLD CALC: 35 % — SIGNIFICANT CHANGE UP (ref 34.5–45)
HCT VFR BLD CALC: 35.1 % — SIGNIFICANT CHANGE UP (ref 34.5–45)
HCT VFR BLD CALC: 35.2 % — SIGNIFICANT CHANGE UP (ref 34.5–45)
HCT VFR BLD CALC: 35.9 % — SIGNIFICANT CHANGE UP (ref 34.5–45)
HCT VFR BLD CALC: 40.9 % — SIGNIFICANT CHANGE UP (ref 34.5–45)
HCT VFR BLD CALC: 42.3 % — SIGNIFICANT CHANGE UP (ref 34.5–45)
HCT VFR BLDA CALC: 29 % — LOW (ref 39–50)
HCT VFR BLDA CALC: 32 % — LOW (ref 39–50)
HCT VFR BLDA CALC: 33 % — LOW (ref 39–50)
HCV AB S/CO SERPL IA: 0.08 S/CO — SIGNIFICANT CHANGE UP (ref 0–0.99)
HCV AB S/CO SERPL IA: 0.08 S/CO — SIGNIFICANT CHANGE UP (ref 0–0.99)
HCV AB SERPL-IMP: SIGNIFICANT CHANGE UP
HCV AB SERPL-IMP: SIGNIFICANT CHANGE UP
HGB BLD CALC-MCNC: 10.5 G/DL — LOW (ref 11.5–15.5)
HGB BLD CALC-MCNC: 10.7 G/DL — LOW (ref 11.5–15.5)
HGB BLD CALC-MCNC: 9.3 G/DL — LOW (ref 11.5–15.5)
HGB BLD-MCNC: 10.2 G/DL — LOW (ref 11.5–15.5)
HGB BLD-MCNC: 10.3 G/DL — LOW (ref 11.5–15.5)
HGB BLD-MCNC: 10.4 G/DL — LOW (ref 11.5–15.5)
HGB BLD-MCNC: 10.4 G/DL — LOW (ref 11.5–15.5)
HGB BLD-MCNC: 10.6 G/DL — LOW (ref 11.5–15.5)
HGB BLD-MCNC: 10.6 G/DL — LOW (ref 11.5–15.5)
HGB BLD-MCNC: 10.7 G/DL — LOW (ref 11.5–15.5)
HGB BLD-MCNC: 10.7 G/DL — LOW (ref 11.5–15.5)
HGB BLD-MCNC: 10.8 G/DL — LOW (ref 11.5–15.5)
HGB BLD-MCNC: 10.9 G/DL — LOW (ref 11.5–15.5)
HGB BLD-MCNC: 11.7 G/DL — SIGNIFICANT CHANGE UP (ref 11.5–15.5)
HGB BLD-MCNC: 12.4 G/DL — SIGNIFICANT CHANGE UP (ref 11.5–15.5)
HGB BLD-MCNC: 13 G/DL — SIGNIFICANT CHANGE UP (ref 11.5–15.5)
HGB BLD-MCNC: 7.4 G/DL — LOW (ref 11.5–15.5)
HGB BLD-MCNC: 8 G/DL — LOW (ref 11.5–15.5)
HGB BLD-MCNC: 8.1 G/DL — LOW (ref 11.5–15.5)
HGB BLD-MCNC: 8.7 G/DL — LOW (ref 11.5–15.5)
HGB BLD-MCNC: 8.9 G/DL — LOW (ref 11.5–15.5)
HGB BLD-MCNC: 9.2 G/DL — LOW (ref 11.5–15.5)
HGB BLD-MCNC: 9.5 G/DL — LOW (ref 11.5–15.5)
HGB BLD-MCNC: 9.5 G/DL — LOW (ref 11.5–15.5)
HGB BLD-MCNC: 9.7 G/DL — LOW (ref 11.5–15.5)
HGB BLD-MCNC: 9.8 G/DL — LOW (ref 11.5–15.5)
HGB BLD-MCNC: 9.8 G/DL — LOW (ref 11.5–15.5)
HYALINE CASTS # UR AUTO: 1 /LPF — SIGNIFICANT CHANGE UP (ref 0–2)
IMM GRANULOCYTES NFR BLD AUTO: 1.3 % — SIGNIFICANT CHANGE UP (ref 0–1.5)
INR BLD: 1.16 RATIO — SIGNIFICANT CHANGE UP (ref 0.88–1.16)
INR BLD: 1.35 RATIO — HIGH (ref 0.88–1.16)
INR BLD: 1.39 RATIO — HIGH (ref 0.88–1.16)
INR BLD: 1.44 RATIO — HIGH (ref 0.88–1.16)
INR BLD: 1.54 RATIO — HIGH (ref 0.88–1.16)
INR BLD: 1.59 RATIO — HIGH (ref 0.88–1.16)
INR BLD: 1.69 RATIO — HIGH (ref 0.88–1.16)
INR BLD: 1.69 RATIO — HIGH (ref 0.88–1.16)
INR BLD: 1.78 RATIO — HIGH (ref 0.88–1.16)
INR BLD: 1.92 RATIO — HIGH (ref 0.88–1.16)
KETONES UR-MCNC: NEGATIVE — SIGNIFICANT CHANGE UP
KETONES UR-MCNC: SIGNIFICANT CHANGE UP
LACTATE BLDV-MCNC: 1.6 MMOL/L — SIGNIFICANT CHANGE UP (ref 0.7–2)
LACTATE BLDV-MCNC: 2.2 MMOL/L — HIGH (ref 0.7–2)
LACTATE BLDV-MCNC: 4 MMOL/L — CRITICAL HIGH (ref 0.7–2)
LACTATE BLDV-MCNC: 4.2 MMOL/L — CRITICAL HIGH (ref 0.7–2)
LACTATE BLDV-MCNC: 6 MMOL/L — CRITICAL HIGH (ref 0.7–2)
LACTATE SERPL-SCNC: 3.6 MMOL/L — HIGH (ref 0.7–2)
LDH SERPL L TO P-CCNC: 263 U/L — SIGNIFICANT CHANGE UP
LEUKOCYTE ESTERASE UR-ACNC: ABNORMAL
LEUKOCYTE ESTERASE UR-ACNC: NEGATIVE — SIGNIFICANT CHANGE UP
LYMPHOCYTES # BLD AUTO: 0.07 K/UL — LOW (ref 1–3.3)
LYMPHOCYTES # BLD AUTO: 0.12 K/UL — LOW (ref 1–3.3)
LYMPHOCYTES # BLD AUTO: 0.3 K/UL — LOW (ref 1–3.3)
LYMPHOCYTES # BLD AUTO: 0.59 K/UL — LOW (ref 1–3.3)
LYMPHOCYTES # BLD AUTO: 0.65 K/UL — LOW (ref 1–3.3)
LYMPHOCYTES # BLD AUTO: 2 % — LOW (ref 13–44)
LYMPHOCYTES # BLD AUTO: 2.6 % — LOW (ref 13–44)
LYMPHOCYTES # BLD AUTO: 25.4 % — SIGNIFICANT CHANGE UP (ref 13–44)
LYMPHOCYTES # BLD AUTO: 38.8 % — SIGNIFICANT CHANGE UP (ref 13–44)
LYMPHOCYTES # BLD AUTO: 4 % — LOW (ref 13–44)
LYMPHOCYTES # FLD: 19 % — SIGNIFICANT CHANGE UP
MAGNESIUM SERPL-MCNC: 1 MG/DL — CRITICAL LOW (ref 1.6–2.6)
MAGNESIUM SERPL-MCNC: 1.1 MG/DL — LOW (ref 1.6–2.6)
MAGNESIUM SERPL-MCNC: 1.6 MG/DL — SIGNIFICANT CHANGE UP (ref 1.6–2.6)
MAGNESIUM SERPL-MCNC: 1.7 MG/DL — SIGNIFICANT CHANGE UP (ref 1.6–2.6)
MAGNESIUM SERPL-MCNC: 1.8 MG/DL — SIGNIFICANT CHANGE UP (ref 1.6–2.6)
MAGNESIUM SERPL-MCNC: 1.9 MG/DL — SIGNIFICANT CHANGE UP (ref 1.6–2.6)
MAGNESIUM SERPL-MCNC: 1.9 MG/DL — SIGNIFICANT CHANGE UP (ref 1.6–2.6)
MAGNESIUM SERPL-MCNC: 2 MG/DL — SIGNIFICANT CHANGE UP (ref 1.6–2.6)
MAGNESIUM SERPL-MCNC: 2.1 MG/DL — SIGNIFICANT CHANGE UP (ref 1.6–2.6)
MAGNESIUM SERPL-MCNC: 2.2 MG/DL — SIGNIFICANT CHANGE UP (ref 1.6–2.6)
MAGNESIUM SERPL-MCNC: 2.2 MG/DL — SIGNIFICANT CHANGE UP (ref 1.6–2.6)
MAGNESIUM SERPL-MCNC: 5 MG/DL — HIGH (ref 1.6–2.6)
MCHC RBC-ENTMCNC: 25.8 PG — LOW (ref 27–34)
MCHC RBC-ENTMCNC: 26.1 PG — LOW (ref 27–34)
MCHC RBC-ENTMCNC: 26.2 PG — LOW (ref 27–34)
MCHC RBC-ENTMCNC: 26.2 PG — LOW (ref 27–34)
MCHC RBC-ENTMCNC: 26.3 PG — LOW (ref 27–34)
MCHC RBC-ENTMCNC: 26.4 PG — LOW (ref 27–34)
MCHC RBC-ENTMCNC: 26.5 PG — LOW (ref 27–34)
MCHC RBC-ENTMCNC: 28.2 PG — SIGNIFICANT CHANGE UP (ref 27–34)
MCHC RBC-ENTMCNC: 28.3 PG — SIGNIFICANT CHANGE UP (ref 27–34)
MCHC RBC-ENTMCNC: 28.3 PG — SIGNIFICANT CHANGE UP (ref 27–34)
MCHC RBC-ENTMCNC: 28.4 PG — SIGNIFICANT CHANGE UP (ref 27–34)
MCHC RBC-ENTMCNC: 28.6 PG — SIGNIFICANT CHANGE UP (ref 27–34)
MCHC RBC-ENTMCNC: 28.8 PG — SIGNIFICANT CHANGE UP (ref 27–34)
MCHC RBC-ENTMCNC: 28.9 PG — SIGNIFICANT CHANGE UP (ref 27–34)
MCHC RBC-ENTMCNC: 28.9 PG — SIGNIFICANT CHANGE UP (ref 27–34)
MCHC RBC-ENTMCNC: 29 PG — SIGNIFICANT CHANGE UP (ref 27–34)
MCHC RBC-ENTMCNC: 29 PG — SIGNIFICANT CHANGE UP (ref 27–34)
MCHC RBC-ENTMCNC: 29.1 PG — SIGNIFICANT CHANGE UP (ref 27–34)
MCHC RBC-ENTMCNC: 29.2 PG — SIGNIFICANT CHANGE UP (ref 27–34)
MCHC RBC-ENTMCNC: 29.4 GM/DL — LOW (ref 32–36)
MCHC RBC-ENTMCNC: 29.5 PG — SIGNIFICANT CHANGE UP (ref 27–34)
MCHC RBC-ENTMCNC: 30 GM/DL — LOW (ref 32–36)
MCHC RBC-ENTMCNC: 30.1 GM/DL — LOW (ref 32–36)
MCHC RBC-ENTMCNC: 30.1 GM/DL — LOW (ref 32–36)
MCHC RBC-ENTMCNC: 30.3 GM/DL — LOW (ref 32–36)
MCHC RBC-ENTMCNC: 30.3 GM/DL — LOW (ref 32–36)
MCHC RBC-ENTMCNC: 30.4 GM/DL — LOW (ref 32–36)
MCHC RBC-ENTMCNC: 30.5 GM/DL — LOW (ref 32–36)
MCHC RBC-ENTMCNC: 30.6 GM/DL — LOW (ref 32–36)
MCHC RBC-ENTMCNC: 30.7 GM/DL — LOW (ref 32–36)
MCHC RBC-ENTMCNC: 31 GM/DL — LOW (ref 32–36)
MCHC RBC-ENTMCNC: 31.2 GM/DL — LOW (ref 32–36)
MCHC RBC-ENTMCNC: 31.5 GM/DL — LOW (ref 32–36)
MCHC RBC-ENTMCNC: 32 GM/DL — SIGNIFICANT CHANGE UP (ref 32–36)
MCHC RBC-ENTMCNC: 32.2 GM/DL — SIGNIFICANT CHANGE UP (ref 32–36)
MCHC RBC-ENTMCNC: 32.3 GM/DL — SIGNIFICANT CHANGE UP (ref 32–36)
MCHC RBC-ENTMCNC: 32.3 GM/DL — SIGNIFICANT CHANGE UP (ref 32–36)
MCHC RBC-ENTMCNC: 32.6 GM/DL — SIGNIFICANT CHANGE UP (ref 32–36)
MCHC RBC-ENTMCNC: 32.7 GM/DL — SIGNIFICANT CHANGE UP (ref 32–36)
MCHC RBC-ENTMCNC: 33 GM/DL — SIGNIFICANT CHANGE UP (ref 32–36)
MCHC RBC-ENTMCNC: 33.1 GM/DL — SIGNIFICANT CHANGE UP (ref 32–36)
MCHC RBC-ENTMCNC: 33.3 GM/DL — SIGNIFICANT CHANGE UP (ref 32–36)
MCHC RBC-ENTMCNC: 33.5 GM/DL — SIGNIFICANT CHANGE UP (ref 32–36)
MCHC RBC-ENTMCNC: 33.6 GM/DL — SIGNIFICANT CHANGE UP (ref 32–36)
MCV RBC AUTO: 83.9 FL — SIGNIFICANT CHANGE UP (ref 80–100)
MCV RBC AUTO: 84.9 FL — SIGNIFICANT CHANGE UP (ref 80–100)
MCV RBC AUTO: 85.8 FL — SIGNIFICANT CHANGE UP (ref 80–100)
MCV RBC AUTO: 86.1 FL — SIGNIFICANT CHANGE UP (ref 80–100)
MCV RBC AUTO: 86.4 FL — SIGNIFICANT CHANGE UP (ref 80–100)
MCV RBC AUTO: 86.5 FL — SIGNIFICANT CHANGE UP (ref 80–100)
MCV RBC AUTO: 86.8 FL — SIGNIFICANT CHANGE UP (ref 80–100)
MCV RBC AUTO: 87.1 FL — SIGNIFICANT CHANGE UP (ref 80–100)
MCV RBC AUTO: 87.2 FL — SIGNIFICANT CHANGE UP (ref 80–100)
MCV RBC AUTO: 87.3 FL — SIGNIFICANT CHANGE UP (ref 80–100)
MCV RBC AUTO: 87.5 FL — SIGNIFICANT CHANGE UP (ref 80–100)
MCV RBC AUTO: 87.7 FL — SIGNIFICANT CHANGE UP (ref 80–100)
MCV RBC AUTO: 87.8 FL — SIGNIFICANT CHANGE UP (ref 80–100)
MCV RBC AUTO: 87.9 FL — SIGNIFICANT CHANGE UP (ref 80–100)
MCV RBC AUTO: 88 FL — SIGNIFICANT CHANGE UP (ref 80–100)
MCV RBC AUTO: 88.4 FL — SIGNIFICANT CHANGE UP (ref 80–100)
MCV RBC AUTO: 88.9 FL — SIGNIFICANT CHANGE UP (ref 80–100)
MCV RBC AUTO: 89.4 FL — SIGNIFICANT CHANGE UP (ref 80–100)
MCV RBC AUTO: 89.5 FL — SIGNIFICANT CHANGE UP (ref 80–100)
MCV RBC AUTO: 89.7 FL — SIGNIFICANT CHANGE UP (ref 80–100)
MCV RBC AUTO: 90.6 FL — SIGNIFICANT CHANGE UP (ref 80–100)
MCV RBC AUTO: 92.2 FL — SIGNIFICANT CHANGE UP (ref 80–100)
MCV RBC AUTO: 92.9 FL — SIGNIFICANT CHANGE UP (ref 80–100)
MCV RBC AUTO: 93.1 FL — SIGNIFICANT CHANGE UP (ref 80–100)
MESOTHL CELL # FLD: 5 % — SIGNIFICANT CHANGE UP
METHOD TYPE: SIGNIFICANT CHANGE UP
MONOCYTES # BLD AUTO: 0.15 K/UL — SIGNIFICANT CHANGE UP (ref 0–0.9)
MONOCYTES # BLD AUTO: 0.18 K/UL — SIGNIFICANT CHANGE UP (ref 0–0.9)
MONOCYTES # BLD AUTO: 0.22 K/UL — SIGNIFICANT CHANGE UP (ref 0–0.9)
MONOCYTES # BLD AUTO: 0.31 K/UL — SIGNIFICANT CHANGE UP (ref 0–0.9)
MONOCYTES # BLD AUTO: 0.36 K/UL — SIGNIFICANT CHANGE UP (ref 0–0.9)
MONOCYTES NFR BLD AUTO: 2.7 % — SIGNIFICANT CHANGE UP (ref 2–14)
MONOCYTES NFR BLD AUTO: 23.7 % — HIGH (ref 2–14)
MONOCYTES NFR BLD AUTO: 5 % — SIGNIFICANT CHANGE UP (ref 2–14)
MONOCYTES NFR BLD AUTO: 6 % — SIGNIFICANT CHANGE UP (ref 2–14)
MONOCYTES NFR BLD AUTO: 7 % — SIGNIFICANT CHANGE UP (ref 2–14)
MONOS+MACROS # FLD: 10 % — SIGNIFICANT CHANGE UP
MRSA PCR RESULT.: SIGNIFICANT CHANGE UP
NEUTROPHILS # BLD AUTO: 0.53 K/UL — LOW (ref 1.8–7.4)
NEUTROPHILS # BLD AUTO: 1.68 K/UL — LOW (ref 1.8–7.4)
NEUTROPHILS # BLD AUTO: 10.79 K/UL — HIGH (ref 1.8–7.4)
NEUTROPHILS # BLD AUTO: 2.21 K/UL — SIGNIFICANT CHANGE UP (ref 1.8–7.4)
NEUTROPHILS # BLD AUTO: 3.28 K/UL — SIGNIFICANT CHANGE UP (ref 1.8–7.4)
NEUTROPHILS NFR BLD AUTO: 34.9 % — LOW (ref 43–77)
NEUTROPHILS NFR BLD AUTO: 55 % — SIGNIFICANT CHANGE UP (ref 43–77)
NEUTROPHILS NFR BLD AUTO: 64.9 % — SIGNIFICANT CHANGE UP (ref 43–77)
NEUTROPHILS NFR BLD AUTO: 76 % — SIGNIFICANT CHANGE UP (ref 43–77)
NEUTROPHILS NFR BLD AUTO: 92.9 % — HIGH (ref 43–77)
NITRITE UR-MCNC: NEGATIVE — SIGNIFICANT CHANGE UP
NON-GYNECOLOGICAL CYTOLOGY STUDY: SIGNIFICANT CHANGE UP
NRBC # BLD: 0 /100 WBCS — SIGNIFICANT CHANGE UP (ref 0–0)
NRBC # BLD: 10 /100 WBCS — HIGH (ref 0–0)
NRBC # BLD: 11 /100 WBCS — HIGH (ref 0–0)
NRBC # BLD: 11 /100 WBCS — HIGH (ref 0–0)
NRBC # BLD: 13 /100 WBCS — HIGH (ref 0–0)
NRBC # BLD: 14 /100 WBCS — HIGH (ref 0–0)
NRBC # BLD: 17 /100 WBCS — HIGH (ref 0–0)
NRBC # BLD: 2 /100 WBCS — HIGH (ref 0–0)
NRBC # BLD: 3 /100 WBCS — HIGH (ref 0–0)
NRBC # BLD: 6 /100 WBCS — HIGH (ref 0–0)
NRBC # BLD: 7 /100 WBCS — HIGH (ref 0–0)
NRBC # BLD: 8 /100 WBCS — HIGH (ref 0–0)
NRBC # FLD: 14 % — HIGH (ref 0–0)
ORGANISM # SPEC MICROSCOPIC CNT: SIGNIFICANT CHANGE UP
OSMOLALITY SERPL: 254 MOSMOL/KG — LOW (ref 275–300)
OSMOLALITY UR: 357 MOSM/KG — SIGNIFICANT CHANGE UP (ref 50–1200)
OSMOLALITY UR: 738 MOSM/KG — SIGNIFICANT CHANGE UP (ref 50–1200)
OTHER CELLS CSF MANUAL: 8 ML/DL — LOW (ref 18–22)
OTHER CELLS FLD MANUAL: 3 % — SIGNIFICANT CHANGE UP
PCO2 BLDV: 27 MMHG — LOW (ref 35–50)
PCO2 BLDV: 35 MMHG — SIGNIFICANT CHANGE UP (ref 35–50)
PCO2 BLDV: 43 MMHG — SIGNIFICANT CHANGE UP (ref 35–50)
PH BLDV: 7.42 — SIGNIFICANT CHANGE UP (ref 7.35–7.45)
PH BLDV: 7.43 — SIGNIFICANT CHANGE UP (ref 7.35–7.45)
PH BLDV: 7.44 — SIGNIFICANT CHANGE UP (ref 7.35–7.45)
PH UR: 6 — SIGNIFICANT CHANGE UP (ref 5–8)
PH UR: 6.5 — SIGNIFICANT CHANGE UP (ref 5–8)
PH UR: 6.5 — SIGNIFICANT CHANGE UP (ref 5–8)
PH UR: 7.5 — SIGNIFICANT CHANGE UP (ref 5–8)
PHOSPHATE SERPL-MCNC: 1 MG/DL — CRITICAL LOW (ref 2.5–4.5)
PHOSPHATE SERPL-MCNC: 1.2 MG/DL — LOW (ref 2.5–4.5)
PHOSPHATE SERPL-MCNC: 1.4 MG/DL — LOW (ref 2.5–4.5)
PHOSPHATE SERPL-MCNC: 1.4 MG/DL — LOW (ref 2.5–4.5)
PHOSPHATE SERPL-MCNC: 1.5 MG/DL — LOW (ref 2.5–4.5)
PHOSPHATE SERPL-MCNC: 1.6 MG/DL — LOW (ref 2.5–4.5)
PHOSPHATE SERPL-MCNC: 2.3 MG/DL — LOW (ref 2.5–4.5)
PHOSPHATE SERPL-MCNC: 2.5 MG/DL — SIGNIFICANT CHANGE UP (ref 2.5–4.5)
PHOSPHATE SERPL-MCNC: 2.6 MG/DL — SIGNIFICANT CHANGE UP (ref 2.5–4.5)
PHOSPHATE SERPL-MCNC: 2.9 MG/DL — SIGNIFICANT CHANGE UP (ref 2.5–4.5)
PHOSPHATE SERPL-MCNC: 2.9 MG/DL — SIGNIFICANT CHANGE UP (ref 2.5–4.5)
PLATELET # BLD AUTO: 11 K/UL — CRITICAL LOW (ref 150–400)
PLATELET # BLD AUTO: 12 K/UL — CRITICAL LOW (ref 150–400)
PLATELET # BLD AUTO: 138 K/UL — LOW (ref 150–400)
PLATELET # BLD AUTO: 14 K/UL — CRITICAL LOW (ref 150–400)
PLATELET # BLD AUTO: 148 K/UL — LOW (ref 150–400)
PLATELET # BLD AUTO: 15 K/UL — CRITICAL LOW (ref 150–400)
PLATELET # BLD AUTO: 151 K/UL — SIGNIFICANT CHANGE UP (ref 150–400)
PLATELET # BLD AUTO: 163 K/UL — SIGNIFICANT CHANGE UP (ref 150–400)
PLATELET # BLD AUTO: 166 K/UL — SIGNIFICANT CHANGE UP (ref 150–400)
PLATELET # BLD AUTO: 18 K/UL — CRITICAL LOW (ref 150–400)
PLATELET # BLD AUTO: 196 K/UL — SIGNIFICANT CHANGE UP (ref 150–400)
PLATELET # BLD AUTO: 200 K/UL — SIGNIFICANT CHANGE UP (ref 150–400)
PLATELET # BLD AUTO: 204 K/UL — SIGNIFICANT CHANGE UP (ref 150–400)
PLATELET # BLD AUTO: 207 K/UL — SIGNIFICANT CHANGE UP (ref 150–400)
PLATELET # BLD AUTO: 22 K/UL — LOW (ref 150–400)
PLATELET # BLD AUTO: 22 K/UL — LOW (ref 150–400)
PLATELET # BLD AUTO: 243 K/UL — SIGNIFICANT CHANGE UP (ref 150–400)
PLATELET # BLD AUTO: 26 K/UL — LOW (ref 150–400)
PLATELET # BLD AUTO: 265 K/UL — SIGNIFICANT CHANGE UP (ref 150–400)
PLATELET # BLD AUTO: 305 K/UL — SIGNIFICANT CHANGE UP (ref 150–400)
PLATELET # BLD AUTO: 49 K/UL — LOW (ref 150–400)
PLATELET # BLD AUTO: 9 K/UL — CRITICAL LOW (ref 150–400)
PO2 BLDV: 100 MMHG — HIGH (ref 25–45)
PO2 BLDV: 36 MMHG — SIGNIFICANT CHANGE UP (ref 25–45)
PO2 BLDV: 37 MMHG — SIGNIFICANT CHANGE UP (ref 25–45)
POTASSIUM BLDV-SCNC: 3.3 MMOL/L — LOW (ref 3.5–5.3)
POTASSIUM BLDV-SCNC: 4.2 MMOL/L — SIGNIFICANT CHANGE UP (ref 3.5–5.3)
POTASSIUM BLDV-SCNC: 4.4 MMOL/L — SIGNIFICANT CHANGE UP (ref 3.5–5.3)
POTASSIUM SERPL-MCNC: 2.8 MMOL/L — CRITICAL LOW (ref 3.5–5.3)
POTASSIUM SERPL-MCNC: 3 MMOL/L — LOW (ref 3.5–5.3)
POTASSIUM SERPL-MCNC: 3.4 MMOL/L — LOW (ref 3.5–5.3)
POTASSIUM SERPL-MCNC: 3.5 MMOL/L — SIGNIFICANT CHANGE UP (ref 3.5–5.3)
POTASSIUM SERPL-MCNC: 3.5 MMOL/L — SIGNIFICANT CHANGE UP (ref 3.5–5.3)
POTASSIUM SERPL-MCNC: 3.8 MMOL/L — SIGNIFICANT CHANGE UP (ref 3.5–5.3)
POTASSIUM SERPL-MCNC: 3.9 MMOL/L — SIGNIFICANT CHANGE UP (ref 3.5–5.3)
POTASSIUM SERPL-MCNC: 3.9 MMOL/L — SIGNIFICANT CHANGE UP (ref 3.5–5.3)
POTASSIUM SERPL-MCNC: 4 MMOL/L — SIGNIFICANT CHANGE UP (ref 3.5–5.3)
POTASSIUM SERPL-MCNC: 4 MMOL/L — SIGNIFICANT CHANGE UP (ref 3.5–5.3)
POTASSIUM SERPL-MCNC: 4.1 MMOL/L — SIGNIFICANT CHANGE UP (ref 3.5–5.3)
POTASSIUM SERPL-MCNC: 4.2 MMOL/L — SIGNIFICANT CHANGE UP (ref 3.5–5.3)
POTASSIUM SERPL-MCNC: 4.2 MMOL/L — SIGNIFICANT CHANGE UP (ref 3.5–5.3)
POTASSIUM SERPL-MCNC: 4.5 MMOL/L — SIGNIFICANT CHANGE UP (ref 3.5–5.3)
POTASSIUM SERPL-MCNC: 5 MMOL/L — SIGNIFICANT CHANGE UP (ref 3.5–5.3)
POTASSIUM SERPL-MCNC: 5.2 MMOL/L — SIGNIFICANT CHANGE UP (ref 3.5–5.3)
POTASSIUM SERPL-SCNC: 2.8 MMOL/L — CRITICAL LOW (ref 3.5–5.3)
POTASSIUM SERPL-SCNC: 3 MMOL/L — LOW (ref 3.5–5.3)
POTASSIUM SERPL-SCNC: 3.4 MMOL/L — LOW (ref 3.5–5.3)
POTASSIUM SERPL-SCNC: 3.5 MMOL/L — SIGNIFICANT CHANGE UP (ref 3.5–5.3)
POTASSIUM SERPL-SCNC: 3.5 MMOL/L — SIGNIFICANT CHANGE UP (ref 3.5–5.3)
POTASSIUM SERPL-SCNC: 3.8 MMOL/L — SIGNIFICANT CHANGE UP (ref 3.5–5.3)
POTASSIUM SERPL-SCNC: 3.9 MMOL/L — SIGNIFICANT CHANGE UP (ref 3.5–5.3)
POTASSIUM SERPL-SCNC: 3.9 MMOL/L — SIGNIFICANT CHANGE UP (ref 3.5–5.3)
POTASSIUM SERPL-SCNC: 4 MMOL/L — SIGNIFICANT CHANGE UP (ref 3.5–5.3)
POTASSIUM SERPL-SCNC: 4 MMOL/L — SIGNIFICANT CHANGE UP (ref 3.5–5.3)
POTASSIUM SERPL-SCNC: 4.1 MMOL/L — SIGNIFICANT CHANGE UP (ref 3.5–5.3)
POTASSIUM SERPL-SCNC: 4.2 MMOL/L — SIGNIFICANT CHANGE UP (ref 3.5–5.3)
POTASSIUM SERPL-SCNC: 4.2 MMOL/L — SIGNIFICANT CHANGE UP (ref 3.5–5.3)
POTASSIUM SERPL-SCNC: 4.5 MMOL/L — SIGNIFICANT CHANGE UP (ref 3.5–5.3)
POTASSIUM SERPL-SCNC: 5 MMOL/L — SIGNIFICANT CHANGE UP (ref 3.5–5.3)
POTASSIUM SERPL-SCNC: 5.2 MMOL/L — SIGNIFICANT CHANGE UP (ref 3.5–5.3)
POTASSIUM UR-SCNC: 64 MMOL/L — SIGNIFICANT CHANGE UP
PROCALCITONIN SERPL-MCNC: 0.45 NG/ML — HIGH (ref 0.02–0.1)
PROCALCITONIN SERPL-MCNC: 0.68 NG/ML — HIGH (ref 0.02–0.1)
PROCALCITONIN SERPL-MCNC: 75.51 NG/ML — HIGH (ref 0.02–0.1)
PROT ?TM UR-MCNC: 98 MG/DL — HIGH (ref 0–12)
PROT FLD-MCNC: 1 G/DL — SIGNIFICANT CHANGE UP
PROT SERPL-MCNC: 2.6 G/DL — LOW (ref 6–8.3)
PROT SERPL-MCNC: 4.3 G/DL — LOW (ref 6–8.3)
PROT SERPL-MCNC: 4.4 G/DL — LOW (ref 6–8.3)
PROT SERPL-MCNC: 4.5 G/DL — LOW (ref 6–8.3)
PROT SERPL-MCNC: 4.6 G/DL — LOW (ref 6–8.3)
PROT SERPL-MCNC: 4.7 G/DL — LOW (ref 6–8.3)
PROT SERPL-MCNC: 4.8 G/DL — LOW (ref 6–8.3)
PROT SERPL-MCNC: 4.8 G/DL — LOW (ref 6–8.3)
PROT SERPL-MCNC: 4.9 G/DL — LOW (ref 6–8.3)
PROT SERPL-MCNC: 5 G/DL — LOW (ref 6–8.3)
PROT SERPL-MCNC: 5.4 G/DL — LOW (ref 6–8.3)
PROT SERPL-MCNC: 6 G/DL — SIGNIFICANT CHANGE UP (ref 6–8.3)
PROT SERPL-MCNC: 6.8 G/DL — SIGNIFICANT CHANGE UP (ref 6–8.3)
PROT UR-MCNC: ABNORMAL
PROT UR-MCNC: NEGATIVE — SIGNIFICANT CHANGE UP
PROT/CREAT UR-RTO: 0.8 RATIO — HIGH (ref 0–0.2)
PROTHROM AB SERPL-ACNC: 13.3 SEC — SIGNIFICANT CHANGE UP (ref 10.6–13.6)
PROTHROM AB SERPL-ACNC: 15.7 SEC — HIGH (ref 10.6–13.6)
PROTHROM AB SERPL-ACNC: 16.2 SEC — HIGH (ref 10–12.9)
PROTHROM AB SERPL-ACNC: 16.7 SEC — HIGH (ref 10–12.9)
PROTHROM AB SERPL-ACNC: 18 SEC — HIGH (ref 10–12.9)
PROTHROM AB SERPL-ACNC: 18.4 SEC — HIGH (ref 10.6–13.6)
PROTHROM AB SERPL-ACNC: 19.6 SEC — HIGH (ref 10.6–13.6)
PROTHROM AB SERPL-ACNC: 19.7 SEC — HIGH (ref 10–12.9)
PROTHROM AB SERPL-ACNC: 20.6 SEC — HIGH (ref 10–12.9)
PROTHROM AB SERPL-ACNC: 22.2 SEC — HIGH (ref 10.6–13.6)
RAPID RVP RESULT: SIGNIFICANT CHANGE UP
RBC # BLD: 2.51 M/UL — LOW (ref 3.8–5.2)
RBC # BLD: 2.75 M/UL — LOW (ref 3.8–5.2)
RBC # BLD: 2.83 M/UL — LOW (ref 3.8–5.2)
RBC # BLD: 3 M/UL — LOW (ref 3.8–5.2)
RBC # BLD: 3.37 M/UL — LOW (ref 3.8–5.2)
RBC # BLD: 3.38 M/UL — LOW (ref 3.8–5.2)
RBC # BLD: 3.51 M/UL — LOW (ref 3.8–5.2)
RBC # BLD: 3.53 M/UL — LOW (ref 3.8–5.2)
RBC # BLD: 3.56 M/UL — LOW (ref 3.8–5.2)
RBC # BLD: 3.6 M/UL — LOW (ref 3.8–5.2)
RBC # BLD: 3.6 M/UL — LOW (ref 3.8–5.2)
RBC # BLD: 3.7 M/UL — LOW (ref 3.8–5.2)
RBC # BLD: 3.71 M/UL — LOW (ref 3.8–5.2)
RBC # BLD: 3.72 M/UL — LOW (ref 3.8–5.2)
RBC # BLD: 3.73 M/UL — LOW (ref 3.8–5.2)
RBC # BLD: 3.74 M/UL — LOW (ref 3.8–5.2)
RBC # BLD: 3.75 M/UL — LOW (ref 3.8–5.2)
RBC # BLD: 3.77 M/UL — LOW (ref 3.8–5.2)
RBC # BLD: 3.85 M/UL — SIGNIFICANT CHANGE UP (ref 3.8–5.2)
RBC # BLD: 3.99 M/UL — SIGNIFICANT CHANGE UP (ref 3.8–5.2)
RBC # BLD: 4 M/UL — SIGNIFICANT CHANGE UP (ref 3.8–5.2)
RBC # BLD: 4.09 M/UL — SIGNIFICANT CHANGE UP (ref 3.8–5.2)
RBC # BLD: 4.59 M/UL — SIGNIFICANT CHANGE UP (ref 3.8–5.2)
RBC # BLD: 4.75 M/UL — SIGNIFICANT CHANGE UP (ref 3.8–5.2)
RBC # FLD: 19.2 % — HIGH (ref 10.3–14.5)
RBC # FLD: 19.6 % — HIGH (ref 10.3–14.5)
RBC # FLD: 19.7 % — HIGH (ref 10.3–14.5)
RBC # FLD: 19.8 % — HIGH (ref 10.3–14.5)
RBC # FLD: 19.9 % — HIGH (ref 10.3–14.5)
RBC # FLD: 19.9 % — HIGH (ref 10.3–14.5)
RBC # FLD: 20 % — HIGH (ref 10.3–14.5)
RBC # FLD: 20.3 % — HIGH (ref 10.3–14.5)
RBC # FLD: 20.9 % — HIGH (ref 10.3–14.5)
RBC # FLD: 21.4 % — HIGH (ref 10.3–14.5)
RBC # FLD: 21.5 % — HIGH (ref 10.3–14.5)
RBC # FLD: 21.5 % — HIGH (ref 10.3–14.5)
RBC # FLD: 21.7 % — HIGH (ref 10.3–14.5)
RBC # FLD: 21.9 % — HIGH (ref 10.3–14.5)
RBC # FLD: 22 % — HIGH (ref 10.3–14.5)
RBC # FLD: 22.3 % — HIGH (ref 10.3–14.5)
RBC # FLD: 22.5 % — HIGH (ref 10.3–14.5)
RBC # FLD: 22.5 % — HIGH (ref 10.3–14.5)
RBC # FLD: 22.8 % — HIGH (ref 10.3–14.5)
RBC # FLD: 23.1 % — HIGH (ref 10.3–14.5)
RBC # FLD: 23.4 % — HIGH (ref 10.3–14.5)
RBC # FLD: 23.6 % — HIGH (ref 10.3–14.5)
RBC CASTS # UR COMP ASSIST: 2 /HPF — SIGNIFICANT CHANGE UP (ref 0–4)
RCV VOL RI: 1510 /UL — HIGH (ref 0–0)
RH IG SCN BLD-IMP: POSITIVE — SIGNIFICANT CHANGE UP
S AUREUS DNA NOSE QL NAA+PROBE: DETECTED
S PYO DNA BLD POS QL NAA+NON-PROBE: SIGNIFICANT CHANGE UP
SAO2 % BLDV: 61 % — LOW (ref 67–88)
SAO2 % BLDV: 62 % — LOW (ref 67–88)
SAO2 % BLDV: 98 % — HIGH (ref 67–88)
SARS-COV-2 IGG SERPL QL IA: NEGATIVE — SIGNIFICANT CHANGE UP
SARS-COV-2 IGG SERPL QL IA: NEGATIVE — SIGNIFICANT CHANGE UP
SARS-COV-2 IGM SERPL IA-ACNC: 0.08 INDEX — SIGNIFICANT CHANGE UP
SARS-COV-2 IGM SERPL IA-ACNC: 0.09 INDEX — SIGNIFICANT CHANGE UP
SARS-COV-2 RNA SPEC QL NAA+PROBE: SIGNIFICANT CHANGE UP
SODIUM SERPL-SCNC: 121 MMOL/L — LOW (ref 135–145)
SODIUM SERPL-SCNC: 121 MMOL/L — LOW (ref 135–145)
SODIUM SERPL-SCNC: 123 MMOL/L — LOW (ref 135–145)
SODIUM SERPL-SCNC: 125 MMOL/L — LOW (ref 135–145)
SODIUM SERPL-SCNC: 125 MMOL/L — LOW (ref 135–145)
SODIUM SERPL-SCNC: 127 MMOL/L — LOW (ref 135–145)
SODIUM SERPL-SCNC: 130 MMOL/L — LOW (ref 135–145)
SODIUM SERPL-SCNC: 130 MMOL/L — LOW (ref 135–145)
SODIUM SERPL-SCNC: 131 MMOL/L — LOW (ref 135–145)
SODIUM SERPL-SCNC: 132 MMOL/L — LOW (ref 135–145)
SODIUM SERPL-SCNC: 133 MMOL/L — LOW (ref 135–145)
SODIUM SERPL-SCNC: 134 MMOL/L — LOW (ref 135–145)
SODIUM SERPL-SCNC: 134 MMOL/L — LOW (ref 135–145)
SODIUM SERPL-SCNC: 136 MMOL/L — SIGNIFICANT CHANGE UP (ref 135–145)
SODIUM SERPL-SCNC: 137 MMOL/L — SIGNIFICANT CHANGE UP (ref 135–145)
SODIUM SERPL-SCNC: 138 MMOL/L — SIGNIFICANT CHANGE UP (ref 135–145)
SODIUM SERPL-SCNC: 138 MMOL/L — SIGNIFICANT CHANGE UP (ref 135–145)
SODIUM SERPL-SCNC: 140 MMOL/L — SIGNIFICANT CHANGE UP (ref 135–145)
SODIUM UR-SCNC: <35 MMOL/L — SIGNIFICANT CHANGE UP
SODIUM UR-SCNC: <35 MMOL/L — SIGNIFICANT CHANGE UP
SP GR SPEC: 1.01 — SIGNIFICANT CHANGE UP (ref 1.01–1.02)
SP GR SPEC: 1.02 — SIGNIFICANT CHANGE UP (ref 1.01–1.02)
SP GR SPEC: 1.02 — SIGNIFICANT CHANGE UP (ref 1.01–1.02)
SP GR SPEC: 1.05 — HIGH (ref 1.01–1.02)
SPECIMEN SOURCE: SIGNIFICANT CHANGE UP
TOTAL NUCLEATED CELL COUNT, BODY FLUID: 177 /UL — SIGNIFICANT CHANGE UP
TSH SERPL-MCNC: 3.05 UIU/ML — SIGNIFICANT CHANGE UP (ref 0.27–4.2)
TUBE TYPE: SIGNIFICANT CHANGE UP
URATE SERPL-MCNC: 3.2 MG/DL — SIGNIFICANT CHANGE UP (ref 2.5–7)
UROBILINOGEN FLD QL: NEGATIVE — SIGNIFICANT CHANGE UP
UUN UR-MCNC: 1173 MG/DL — SIGNIFICANT CHANGE UP
VANCOMYCIN TROUGH SERPL-MCNC: 8.5 UG/ML — LOW (ref 10–20)
VANCOMYCIN TROUGH SERPL-MCNC: 9.6 UG/ML — LOW (ref 10–20)
VANCOMYCIN TROUGH SERPL-MCNC: <4 UG/ML — LOW (ref 10–20)
WBC # BLD: 1.42 K/UL — LOW (ref 3.8–10.5)
WBC # BLD: 1.52 K/UL — LOW (ref 3.8–10.5)
WBC # BLD: 1.84 K/UL — LOW (ref 3.8–10.5)
WBC # BLD: 1.89 K/UL — LOW (ref 3.8–10.5)
WBC # BLD: 1.98 K/UL — LOW (ref 3.8–10.5)
WBC # BLD: 11.39 K/UL — HIGH (ref 3.8–10.5)
WBC # BLD: 2.01 K/UL — LOW (ref 3.8–10.5)
WBC # BLD: 2.37 K/UL — LOW (ref 3.8–10.5)
WBC # BLD: 2.56 K/UL — LOW (ref 3.8–10.5)
WBC # BLD: 2.61 K/UL — LOW (ref 3.8–10.5)
WBC # BLD: 2.65 K/UL — LOW (ref 3.8–10.5)
WBC # BLD: 2.74 K/UL — LOW (ref 3.8–10.5)
WBC # BLD: 2.79 K/UL — LOW (ref 3.8–10.5)
WBC # BLD: 2.95 K/UL — LOW (ref 3.8–10.5)
WBC # BLD: 3.05 K/UL — LOW (ref 3.8–10.5)
WBC # BLD: 3.57 K/UL — LOW (ref 3.8–10.5)
WBC # BLD: 3.6 K/UL — LOW (ref 3.8–10.5)
WBC # BLD: 3.66 K/UL — LOW (ref 3.8–10.5)
WBC # BLD: 3.8 K/UL — SIGNIFICANT CHANGE UP (ref 3.8–10.5)
WBC # BLD: 3.93 K/UL — SIGNIFICANT CHANGE UP (ref 3.8–10.5)
WBC # BLD: 4.01 K/UL — SIGNIFICANT CHANGE UP (ref 3.8–10.5)
WBC # BLD: 4.41 K/UL — SIGNIFICANT CHANGE UP (ref 3.8–10.5)
WBC # BLD: 6.05 K/UL — SIGNIFICANT CHANGE UP (ref 3.8–10.5)
WBC # BLD: 8.06 K/UL — SIGNIFICANT CHANGE UP (ref 3.8–10.5)
WBC # FLD AUTO: 1.42 K/UL — LOW (ref 3.8–10.5)
WBC # FLD AUTO: 1.52 K/UL — LOW (ref 3.8–10.5)
WBC # FLD AUTO: 1.84 K/UL — LOW (ref 3.8–10.5)
WBC # FLD AUTO: 1.89 K/UL — LOW (ref 3.8–10.5)
WBC # FLD AUTO: 1.98 K/UL — LOW (ref 3.8–10.5)
WBC # FLD AUTO: 11.39 K/UL — HIGH (ref 3.8–10.5)
WBC # FLD AUTO: 2.01 K/UL — LOW (ref 3.8–10.5)
WBC # FLD AUTO: 2.37 K/UL — LOW (ref 3.8–10.5)
WBC # FLD AUTO: 2.56 K/UL — LOW (ref 3.8–10.5)
WBC # FLD AUTO: 2.61 K/UL — LOW (ref 3.8–10.5)
WBC # FLD AUTO: 2.65 K/UL — LOW (ref 3.8–10.5)
WBC # FLD AUTO: 2.74 K/UL — LOW (ref 3.8–10.5)
WBC # FLD AUTO: 2.79 K/UL — LOW (ref 3.8–10.5)
WBC # FLD AUTO: 2.95 K/UL — LOW (ref 3.8–10.5)
WBC # FLD AUTO: 3.05 K/UL — LOW (ref 3.8–10.5)
WBC # FLD AUTO: 3.57 K/UL — LOW (ref 3.8–10.5)
WBC # FLD AUTO: 3.6 K/UL — LOW (ref 3.8–10.5)
WBC # FLD AUTO: 3.66 K/UL — LOW (ref 3.8–10.5)
WBC # FLD AUTO: 3.8 K/UL — SIGNIFICANT CHANGE UP (ref 3.8–10.5)
WBC # FLD AUTO: 3.93 K/UL — SIGNIFICANT CHANGE UP (ref 3.8–10.5)
WBC # FLD AUTO: 4.01 K/UL — SIGNIFICANT CHANGE UP (ref 3.8–10.5)
WBC # FLD AUTO: 4.41 K/UL — SIGNIFICANT CHANGE UP (ref 3.8–10.5)
WBC # FLD AUTO: 6.05 K/UL — SIGNIFICANT CHANGE UP (ref 3.8–10.5)
WBC # FLD AUTO: 8.06 K/UL — SIGNIFICANT CHANGE UP (ref 3.8–10.5)
WBC UR QL: 1 /HPF — SIGNIFICANT CHANGE UP (ref 0–5)

## 2020-01-01 PROCEDURE — 97161 PT EVAL LOW COMPLEX 20 MIN: CPT

## 2020-01-01 PROCEDURE — 84145 PROCALCITONIN (PCT): CPT

## 2020-01-01 PROCEDURE — 82570 ASSAY OF URINE CREATININE: CPT

## 2020-01-01 PROCEDURE — 81001 URINALYSIS AUTO W/SCOPE: CPT

## 2020-01-01 PROCEDURE — 86850 RBC ANTIBODY SCREEN: CPT

## 2020-01-01 PROCEDURE — P9047: CPT

## 2020-01-01 PROCEDURE — 82962 GLUCOSE BLOOD TEST: CPT

## 2020-01-01 PROCEDURE — 83605 ASSAY OF LACTIC ACID: CPT

## 2020-01-01 PROCEDURE — 88342 IMHCHEM/IMCYTCHM 1ST ANTB: CPT

## 2020-01-01 PROCEDURE — 82435 ASSAY OF BLOOD CHLORIDE: CPT

## 2020-01-01 PROCEDURE — 76705 ECHO EXAM OF ABDOMEN: CPT | Mod: 26

## 2020-01-01 PROCEDURE — 99291 CRITICAL CARE FIRST HOUR: CPT

## 2020-01-01 PROCEDURE — 80202 ASSAY OF VANCOMYCIN: CPT

## 2020-01-01 PROCEDURE — 85730 THROMBOPLASTIN TIME PARTIAL: CPT

## 2020-01-01 PROCEDURE — 84132 ASSAY OF SERUM POTASSIUM: CPT

## 2020-01-01 PROCEDURE — 71260 CT THORAX DX C+: CPT | Mod: 26

## 2020-01-01 PROCEDURE — 99232 SBSQ HOSP IP/OBS MODERATE 35: CPT

## 2020-01-01 PROCEDURE — 82330 ASSAY OF CALCIUM: CPT

## 2020-01-01 PROCEDURE — 99233 SBSQ HOSP IP/OBS HIGH 50: CPT | Mod: GC

## 2020-01-01 PROCEDURE — 84157 ASSAY OF PROTEIN OTHER: CPT

## 2020-01-01 PROCEDURE — 51701 INSERT BLADDER CATHETER: CPT

## 2020-01-01 PROCEDURE — 88112 CYTOPATH CELL ENHANCE TECH: CPT | Mod: 26

## 2020-01-01 PROCEDURE — 80053 COMPREHEN METABOLIC PANEL: CPT

## 2020-01-01 PROCEDURE — 88305 TISSUE EXAM BY PATHOLOGIST: CPT

## 2020-01-01 PROCEDURE — 99497 ADVNCD CARE PLAN 30 MIN: CPT | Mod: 25

## 2020-01-01 PROCEDURE — 70470 CT HEAD/BRAIN W/O & W/DYE: CPT

## 2020-01-01 PROCEDURE — 84100 ASSAY OF PHOSPHORUS: CPT

## 2020-01-01 PROCEDURE — 99233 SBSQ HOSP IP/OBS HIGH 50: CPT

## 2020-01-01 PROCEDURE — 85610 PROTHROMBIN TIME: CPT

## 2020-01-01 PROCEDURE — 99233 SBSQ HOSP IP/OBS HIGH 50: CPT | Mod: 25

## 2020-01-01 PROCEDURE — 84133 ASSAY OF URINE POTASSIUM: CPT

## 2020-01-01 PROCEDURE — 80074 ACUTE HEPATITIS PANEL: CPT

## 2020-01-01 PROCEDURE — 82803 BLOOD GASES ANY COMBINATION: CPT

## 2020-01-01 PROCEDURE — 87633 RESP VIRUS 12-25 TARGETS: CPT

## 2020-01-01 PROCEDURE — 93306 TTE W/DOPPLER COMPLETE: CPT

## 2020-01-01 PROCEDURE — 96375 TX/PRO/DX INJ NEW DRUG ADDON: CPT | Mod: XU

## 2020-01-01 PROCEDURE — 87086 URINE CULTURE/COLONY COUNT: CPT

## 2020-01-01 PROCEDURE — 36430 TRANSFUSION BLD/BLD COMPNT: CPT

## 2020-01-01 PROCEDURE — 74177 CT ABD & PELVIS W/CONTRAST: CPT

## 2020-01-01 PROCEDURE — 93005 ELECTROCARDIOGRAM TRACING: CPT | Mod: XU

## 2020-01-01 PROCEDURE — 86901 BLOOD TYPING SEROLOGIC RH(D): CPT

## 2020-01-01 PROCEDURE — 49083 ABD PARACENTESIS W/IMAGING: CPT

## 2020-01-01 PROCEDURE — 93971 EXTREMITY STUDY: CPT

## 2020-01-01 PROCEDURE — 82533 TOTAL CORTISOL: CPT

## 2020-01-01 PROCEDURE — 82042 OTHER SOURCE ALBUMIN QUAN EA: CPT

## 2020-01-01 PROCEDURE — 96374 THER/PROPH/DIAG INJ IV PUSH: CPT | Mod: XU

## 2020-01-01 PROCEDURE — 93005 ELECTROCARDIOGRAM TRACING: CPT

## 2020-01-01 PROCEDURE — 99498 ADVNCD CARE PLAN ADDL 30 MIN: CPT | Mod: 25

## 2020-01-01 PROCEDURE — 82947 ASSAY GLUCOSE BLOOD QUANT: CPT

## 2020-01-01 PROCEDURE — 99223 1ST HOSP IP/OBS HIGH 75: CPT | Mod: GC

## 2020-01-01 PROCEDURE — 97162 PT EVAL MOD COMPLEX 30 MIN: CPT

## 2020-01-01 PROCEDURE — 80048 BASIC METABOLIC PNL TOTAL CA: CPT

## 2020-01-01 PROCEDURE — 74177 CT ABD & PELVIS W/CONTRAST: CPT | Mod: 26

## 2020-01-01 PROCEDURE — 85027 COMPLETE CBC AUTOMATED: CPT

## 2020-01-01 PROCEDURE — 93971 EXTREMITY STUDY: CPT | Mod: 26

## 2020-01-01 PROCEDURE — 76604 US EXAM CHEST: CPT | Mod: 26

## 2020-01-01 PROCEDURE — 96376 TX/PRO/DX INJ SAME DRUG ADON: CPT | Mod: XU

## 2020-01-01 PROCEDURE — 96375 TX/PRO/DX INJ NEW DRUG ADDON: CPT

## 2020-01-01 PROCEDURE — 71045 X-RAY EXAM CHEST 1 VIEW: CPT | Mod: 26

## 2020-01-01 PROCEDURE — 73701 CT LOWER EXTREMITY W/DYE: CPT | Mod: 26,LT

## 2020-01-01 PROCEDURE — 90715 TDAP VACCINE 7 YRS/> IM: CPT

## 2020-01-01 PROCEDURE — 76705 ECHO EXAM OF ABDOMEN: CPT

## 2020-01-01 PROCEDURE — 97530 THERAPEUTIC ACTIVITIES: CPT

## 2020-01-01 PROCEDURE — 73552 X-RAY EXAM OF FEMUR 2/>: CPT

## 2020-01-01 PROCEDURE — 87640 STAPH A DNA AMP PROBE: CPT

## 2020-01-01 PROCEDURE — 88108 CYTOPATH CONCENTRATE TECH: CPT | Mod: 26,59

## 2020-01-01 PROCEDURE — 73610 X-RAY EXAM OF ANKLE: CPT | Mod: 26,LT

## 2020-01-01 PROCEDURE — 99497 ADVNCD CARE PLAN 30 MIN: CPT

## 2020-01-01 PROCEDURE — 93308 TTE F-UP OR LMTD: CPT | Mod: 26

## 2020-01-01 PROCEDURE — 85014 HEMATOCRIT: CPT

## 2020-01-01 PROCEDURE — 73552 X-RAY EXAM OF FEMUR 2/>: CPT | Mod: 26,LT

## 2020-01-01 PROCEDURE — 82436 ASSAY OF URINE CHLORIDE: CPT

## 2020-01-01 PROCEDURE — 87070 CULTURE OTHR SPECIMN AEROBIC: CPT

## 2020-01-01 PROCEDURE — 83615 LACTATE (LD) (LDH) ENZYME: CPT

## 2020-01-01 PROCEDURE — 90471 IMMUNIZATION ADMIN: CPT | Mod: XU

## 2020-01-01 PROCEDURE — 83935 ASSAY OF URINE OSMOLALITY: CPT

## 2020-01-01 PROCEDURE — 84300 ASSAY OF URINE SODIUM: CPT

## 2020-01-01 PROCEDURE — P9037: CPT

## 2020-01-01 PROCEDURE — 87581 M.PNEUMON DNA AMP PROBE: CPT

## 2020-01-01 PROCEDURE — 88342 IMHCHEM/IMCYTCHM 1ST ANTB: CPT | Mod: 26,59

## 2020-01-01 PROCEDURE — 99222 1ST HOSP IP/OBS MODERATE 55: CPT

## 2020-01-01 PROCEDURE — 87486 CHLMYD PNEUM DNA AMP PROBE: CPT

## 2020-01-01 PROCEDURE — 84295 ASSAY OF SERUM SODIUM: CPT

## 2020-01-01 PROCEDURE — 99285 EMERGENCY DEPT VISIT HI MDM: CPT

## 2020-01-01 PROCEDURE — 71260 CT THORAX DX C+: CPT

## 2020-01-01 PROCEDURE — 82945 GLUCOSE OTHER FLUID: CPT

## 2020-01-01 PROCEDURE — C1729: CPT

## 2020-01-01 PROCEDURE — 82550 ASSAY OF CK (CPK): CPT

## 2020-01-01 PROCEDURE — 88341 IMHCHEM/IMCYTCHM EA ADD ANTB: CPT

## 2020-01-01 PROCEDURE — 99223 1ST HOSP IP/OBS HIGH 75: CPT

## 2020-01-01 PROCEDURE — 83735 ASSAY OF MAGNESIUM: CPT

## 2020-01-01 PROCEDURE — 87184 SC STD DISK METHOD PER PLATE: CPT

## 2020-01-01 PROCEDURE — 72193 CT PELVIS W/DYE: CPT | Mod: 26

## 2020-01-01 PROCEDURE — 87040 BLOOD CULTURE FOR BACTERIA: CPT

## 2020-01-01 PROCEDURE — U0003: CPT

## 2020-01-01 PROCEDURE — 71045 X-RAY EXAM CHEST 1 VIEW: CPT

## 2020-01-01 PROCEDURE — 83930 ASSAY OF BLOOD OSMOLALITY: CPT

## 2020-01-01 PROCEDURE — 72193 CT PELVIS W/DYE: CPT

## 2020-01-01 PROCEDURE — 84156 ASSAY OF PROTEIN URINE: CPT

## 2020-01-01 PROCEDURE — C1769: CPT

## 2020-01-01 PROCEDURE — 89051 BODY FLUID CELL COUNT: CPT

## 2020-01-01 PROCEDURE — 93306 TTE W/DOPPLER COMPLETE: CPT | Mod: 26

## 2020-01-01 PROCEDURE — 85018 HEMOGLOBIN: CPT

## 2020-01-01 PROCEDURE — 84540 ASSAY OF URINE/UREA-N: CPT

## 2020-01-01 PROCEDURE — 83690 ASSAY OF LIPASE: CPT

## 2020-01-01 PROCEDURE — 84443 ASSAY THYROID STIM HORMONE: CPT

## 2020-01-01 PROCEDURE — 99291 CRITICAL CARE FIRST HOUR: CPT | Mod: 25

## 2020-01-01 PROCEDURE — 73610 X-RAY EXAM OF ANKLE: CPT

## 2020-01-01 PROCEDURE — 93010 ELECTROCARDIOGRAM REPORT: CPT

## 2020-01-01 PROCEDURE — 85652 RBC SED RATE AUTOMATED: CPT

## 2020-01-01 PROCEDURE — 87075 CULTR BACTERIA EXCEPT BLOOD: CPT

## 2020-01-01 PROCEDURE — 87641 MR-STAPH DNA AMP PROBE: CPT

## 2020-01-01 PROCEDURE — 86900 BLOOD TYPING SEROLOGIC ABO: CPT

## 2020-01-01 PROCEDURE — 87635 SARS-COV-2 COVID-19 AMP PRB: CPT

## 2020-01-01 PROCEDURE — 87205 SMEAR GRAM STAIN: CPT

## 2020-01-01 PROCEDURE — 84550 ASSAY OF BLOOD/URIC ACID: CPT

## 2020-01-01 PROCEDURE — 99254 IP/OBS CNSLTJ NEW/EST MOD 60: CPT

## 2020-01-01 PROCEDURE — 87102 FUNGUS ISOLATION CULTURE: CPT

## 2020-01-01 PROCEDURE — 82977 ASSAY OF GGT: CPT

## 2020-01-01 PROCEDURE — 86769 SARS-COV-2 COVID-19 ANTIBODY: CPT

## 2020-01-01 PROCEDURE — 73701 CT LOWER EXTREMITY W/DYE: CPT

## 2020-01-01 PROCEDURE — 82140 ASSAY OF AMMONIA: CPT

## 2020-01-01 PROCEDURE — 88112 CYTOPATH CELL ENHANCE TECH: CPT

## 2020-01-01 PROCEDURE — 99223 1ST HOSP IP/OBS HIGH 75: CPT | Mod: 25

## 2020-01-01 PROCEDURE — 88305 TISSUE EXAM BY PATHOLOGIST: CPT | Mod: 26

## 2020-01-01 PROCEDURE — 99285 EMERGENCY DEPT VISIT HI MDM: CPT | Mod: 25

## 2020-01-01 PROCEDURE — 70470 CT HEAD/BRAIN W/O & W/DYE: CPT | Mod: 26

## 2020-01-01 PROCEDURE — 87150 DNA/RNA AMPLIFIED PROBE: CPT

## 2020-01-01 PROCEDURE — 86803 HEPATITIS C AB TEST: CPT

## 2020-01-01 PROCEDURE — 87798 DETECT AGENT NOS DNA AMP: CPT

## 2020-01-01 RX ORDER — PHYTONADIONE (VIT K1) 5 MG
5 TABLET ORAL DAILY
Refills: 0 | Status: COMPLETED | OUTPATIENT
Start: 2020-01-01 | End: 2020-01-01

## 2020-01-01 RX ORDER — HYDROMORPHONE HYDROCHLORIDE 2 MG/ML
1 INJECTION INTRAMUSCULAR; INTRAVENOUS; SUBCUTANEOUS
Refills: 0 | Status: DISCONTINUED | OUTPATIENT
Start: 2020-01-01 | End: 2020-01-01

## 2020-01-01 RX ORDER — DEXAMETHASONE 0.5 MG/5ML
2 ELIXIR ORAL
Refills: 0 | Status: DISCONTINUED | OUTPATIENT
Start: 2020-01-01 | End: 2020-01-01

## 2020-01-01 RX ORDER — FAMOTIDINE 10 MG/ML
20 INJECTION INTRAVENOUS ONCE
Refills: 0 | Status: DISCONTINUED | OUTPATIENT
Start: 2020-01-01 | End: 2020-01-01

## 2020-01-01 RX ORDER — LORATADINE 10 MG/1
1 TABLET ORAL
Qty: 0 | Refills: 0 | DISCHARGE

## 2020-01-01 RX ORDER — SODIUM CHLORIDE 9 MG/ML
1000 INJECTION INTRAMUSCULAR; INTRAVENOUS; SUBCUTANEOUS
Refills: 0 | Status: DISCONTINUED | OUTPATIENT
Start: 2020-01-01 | End: 2020-01-01

## 2020-01-01 RX ORDER — HYDROMORPHONE HYDROCHLORIDE 2 MG/ML
30 INJECTION INTRAMUSCULAR; INTRAVENOUS; SUBCUTANEOUS
Refills: 0 | Status: DISCONTINUED | OUTPATIENT
Start: 2020-01-01 | End: 2020-01-01

## 2020-01-01 RX ORDER — FAMOTIDINE 10 MG/ML
20 INJECTION INTRAVENOUS ONCE
Refills: 0 | Status: COMPLETED | OUTPATIENT
Start: 2020-01-01 | End: 2020-01-01

## 2020-01-01 RX ORDER — IPRATROPIUM/ALBUTEROL SULFATE 18-103MCG
3 AEROSOL WITH ADAPTER (GRAM) INHALATION EVERY 6 HOURS
Refills: 0 | Status: DISCONTINUED | OUTPATIENT
Start: 2020-01-01 | End: 2020-01-01

## 2020-01-01 RX ORDER — VANCOMYCIN HCL 1 G
1000 VIAL (EA) INTRAVENOUS ONCE
Refills: 0 | Status: COMPLETED | OUTPATIENT
Start: 2020-01-01 | End: 2020-01-01

## 2020-01-01 RX ORDER — CHLORHEXIDINE GLUCONATE 213 G/1000ML
1 SOLUTION TOPICAL
Refills: 0 | Status: DISCONTINUED | OUTPATIENT
Start: 2020-01-01 | End: 2020-01-01

## 2020-01-01 RX ORDER — SODIUM CHLORIDE 9 MG/ML
3 INJECTION INTRAMUSCULAR; INTRAVENOUS; SUBCUTANEOUS EVERY 8 HOURS
Refills: 0 | Status: DISCONTINUED | OUTPATIENT
Start: 2020-01-01 | End: 2020-01-01

## 2020-01-01 RX ORDER — MIDODRINE HYDROCHLORIDE 2.5 MG/1
20 TABLET ORAL EVERY 8 HOURS
Refills: 0 | Status: DISCONTINUED | OUTPATIENT
Start: 2020-01-01 | End: 2020-01-01

## 2020-01-01 RX ORDER — CHLORHEXIDINE GLUCONATE 213 G/1000ML
1 SOLUTION TOPICAL DAILY
Refills: 0 | Status: DISCONTINUED | OUTPATIENT
Start: 2020-01-01 | End: 2020-01-01

## 2020-01-01 RX ORDER — NOREPINEPHRINE BITARTRATE/D5W 8 MG/250ML
0.05 PLASTIC BAG, INJECTION (ML) INTRAVENOUS
Qty: 8 | Refills: 0 | Status: DISCONTINUED | OUTPATIENT
Start: 2020-01-01 | End: 2020-01-01

## 2020-01-01 RX ORDER — MIDODRINE HYDROCHLORIDE 2.5 MG/1
20 TABLET ORAL ONCE
Refills: 0 | Status: COMPLETED | OUTPATIENT
Start: 2020-01-01 | End: 2020-01-01

## 2020-01-01 RX ORDER — HYDROMORPHONE HYDROCHLORIDE 2 MG/ML
1 INJECTION INTRAMUSCULAR; INTRAVENOUS; SUBCUTANEOUS ONCE
Refills: 0 | Status: DISCONTINUED | OUTPATIENT
Start: 2020-01-01 | End: 2020-01-01

## 2020-01-01 RX ORDER — ALBUMIN HUMAN 25 %
50 VIAL (ML) INTRAVENOUS EVERY 4 HOURS
Refills: 0 | Status: COMPLETED | OUTPATIENT
Start: 2020-01-01 | End: 2020-01-01

## 2020-01-01 RX ORDER — LACTOBACILLUS ACIDOPHILUS 100MM CELL
1 CAPSULE ORAL DAILY
Refills: 0 | Status: DISCONTINUED | OUTPATIENT
Start: 2020-01-01 | End: 2020-01-01

## 2020-01-01 RX ORDER — SIMETHICONE 80 MG/1
80 TABLET, CHEWABLE ORAL DAILY
Refills: 0 | Status: DISCONTINUED | OUTPATIENT
Start: 2020-01-01 | End: 2020-01-01

## 2020-01-01 RX ORDER — ONDANSETRON 8 MG/1
4 TABLET, FILM COATED ORAL EVERY 8 HOURS
Refills: 0 | Status: DISCONTINUED | OUTPATIENT
Start: 2020-01-01 | End: 2020-01-01

## 2020-01-01 RX ORDER — IPRATROPIUM BROMIDE 0.2 MG/ML
500 SOLUTION, NON-ORAL INHALATION ONCE
Refills: 0 | Status: COMPLETED | OUTPATIENT
Start: 2020-01-01 | End: 2020-01-01

## 2020-01-01 RX ORDER — PIPERACILLIN AND TAZOBACTAM 4; .5 G/20ML; G/20ML
3.38 INJECTION, POWDER, LYOPHILIZED, FOR SOLUTION INTRAVENOUS ONCE
Refills: 0 | Status: COMPLETED | OUTPATIENT
Start: 2020-01-01 | End: 2020-01-01

## 2020-01-01 RX ORDER — ONDANSETRON 8 MG/1
4 TABLET, FILM COATED ORAL EVERY 6 HOURS
Refills: 0 | Status: DISCONTINUED | OUTPATIENT
Start: 2020-01-01 | End: 2020-01-01

## 2020-01-01 RX ORDER — ENOXAPARIN SODIUM 100 MG/ML
30 INJECTION SUBCUTANEOUS DAILY
Refills: 0 | Status: DISCONTINUED | OUTPATIENT
Start: 2020-01-01 | End: 2020-01-01

## 2020-01-01 RX ORDER — ACETAMINOPHEN 500 MG
650 TABLET ORAL ONCE
Refills: 0 | Status: COMPLETED | OUTPATIENT
Start: 2020-01-01 | End: 2020-01-01

## 2020-01-01 RX ORDER — POLYETHYLENE GLYCOL 3350 17 G/17G
17 POWDER, FOR SOLUTION ORAL DAILY
Refills: 0 | Status: DISCONTINUED | OUTPATIENT
Start: 2020-01-01 | End: 2020-01-01

## 2020-01-01 RX ORDER — PHYTONADIONE (VIT K1) 5 MG
5 TABLET ORAL ONCE
Refills: 0 | Status: COMPLETED | OUTPATIENT
Start: 2020-01-01 | End: 2020-01-01

## 2020-01-01 RX ORDER — VANCOMYCIN HCL 1 G
1000 VIAL (EA) INTRAVENOUS ONCE
Refills: 0 | Status: DISCONTINUED | OUTPATIENT
Start: 2020-01-01 | End: 2020-01-01

## 2020-01-01 RX ORDER — VANCOMYCIN HCL 1 G
1000 VIAL (EA) INTRAVENOUS EVERY 12 HOURS
Refills: 0 | Status: DISCONTINUED | OUTPATIENT
Start: 2020-01-01 | End: 2020-01-01

## 2020-01-01 RX ORDER — POTASSIUM CHLORIDE 20 MEQ
20 PACKET (EA) ORAL
Refills: 0 | Status: COMPLETED | OUTPATIENT
Start: 2020-01-01 | End: 2020-01-01

## 2020-01-01 RX ORDER — CALCIUM GLUCONATE 100 MG/ML
1 VIAL (ML) INTRAVENOUS ONCE
Refills: 0 | Status: COMPLETED | OUTPATIENT
Start: 2020-01-01 | End: 2020-01-01

## 2020-01-01 RX ORDER — SODIUM CHLORIDE 9 MG/ML
500 INJECTION, SOLUTION INTRAVENOUS ONCE
Refills: 0 | Status: COMPLETED | OUTPATIENT
Start: 2020-01-01 | End: 2020-01-01

## 2020-01-01 RX ORDER — POTASSIUM PHOSPHATE, MONOBASIC POTASSIUM PHOSPHATE, DIBASIC 236; 224 MG/ML; MG/ML
15 INJECTION, SOLUTION INTRAVENOUS ONCE
Refills: 0 | Status: COMPLETED | OUTPATIENT
Start: 2020-01-01 | End: 2020-01-01

## 2020-01-01 RX ORDER — AMPICILLIN TRIHYDRATE 250 MG
CAPSULE ORAL
Refills: 0 | Status: DISCONTINUED | OUTPATIENT
Start: 2020-01-01 | End: 2020-01-01

## 2020-01-01 RX ORDER — MIDODRINE HYDROCHLORIDE 2.5 MG/1
2 TABLET ORAL
Qty: 180 | Refills: 0
Start: 2020-01-01 | End: 2020-01-01

## 2020-01-01 RX ORDER — FUROSEMIDE 40 MG
40 TABLET ORAL DAILY
Refills: 0 | Status: DISCONTINUED | OUTPATIENT
Start: 2020-01-01 | End: 2020-01-01

## 2020-01-01 RX ORDER — OMEPRAZOLE 10 MG/1
1 CAPSULE, DELAYED RELEASE ORAL
Qty: 0 | Refills: 0 | DISCHARGE

## 2020-01-01 RX ORDER — SIMETHICONE 80 MG/1
160 TABLET, CHEWABLE ORAL
Refills: 0 | Status: DISCONTINUED | OUTPATIENT
Start: 2020-01-01 | End: 2020-01-01

## 2020-01-01 RX ORDER — DEXAMETHASONE 0.5 MG/5ML
0 ELIXIR ORAL
Qty: 0 | Refills: 0 | DISCHARGE

## 2020-01-01 RX ORDER — PIPERACILLIN AND TAZOBACTAM 4; .5 G/20ML; G/20ML
3.38 INJECTION, POWDER, LYOPHILIZED, FOR SOLUTION INTRAVENOUS EVERY 8 HOURS
Refills: 0 | Status: DISCONTINUED | OUTPATIENT
Start: 2020-01-01 | End: 2020-01-01

## 2020-01-01 RX ORDER — POTASSIUM CHLORIDE 20 MEQ
40 PACKET (EA) ORAL ONCE
Refills: 0 | Status: COMPLETED | OUTPATIENT
Start: 2020-01-01 | End: 2020-01-01

## 2020-01-01 RX ORDER — POTASSIUM CHLORIDE 20 MEQ
40 PACKET (EA) ORAL ONCE
Refills: 0 | Status: DISCONTINUED | OUTPATIENT
Start: 2020-01-01 | End: 2020-01-01

## 2020-01-01 RX ORDER — FENTANYL CITRATE 50 UG/ML
50 INJECTION INTRAVENOUS ONCE
Refills: 0 | Status: DISCONTINUED | OUTPATIENT
Start: 2020-01-01 | End: 2020-01-01

## 2020-01-01 RX ORDER — FENTANYL CITRATE 50 UG/ML
1 INJECTION INTRAVENOUS
Refills: 0 | Status: DISCONTINUED | OUTPATIENT
Start: 2020-01-01 | End: 2020-01-01

## 2020-01-01 RX ORDER — ACYCLOVIR SODIUM 500 MG
650 VIAL (EA) INTRAVENOUS EVERY 8 HOURS
Refills: 0 | Status: DISCONTINUED | OUTPATIENT
Start: 2020-01-01 | End: 2020-01-01

## 2020-01-01 RX ORDER — SODIUM CHLORIDE 9 MG/ML
1500 INJECTION INTRAMUSCULAR; INTRAVENOUS; SUBCUTANEOUS ONCE
Refills: 0 | Status: COMPLETED | OUTPATIENT
Start: 2020-01-01 | End: 2020-01-01

## 2020-01-01 RX ORDER — SIMETHICONE 80 MG/1
2 TABLET, CHEWABLE ORAL
Qty: 0 | Refills: 0 | DISCHARGE
Start: 2020-01-01

## 2020-01-01 RX ORDER — CEFEPIME 1 G/1
2000 INJECTION, POWDER, FOR SOLUTION INTRAMUSCULAR; INTRAVENOUS ONCE
Refills: 0 | Status: COMPLETED | OUTPATIENT
Start: 2020-01-01 | End: 2020-01-01

## 2020-01-01 RX ORDER — HYDROMORPHONE HYDROCHLORIDE 2 MG/ML
0.5 INJECTION INTRAMUSCULAR; INTRAVENOUS; SUBCUTANEOUS ONCE
Refills: 0 | Status: DISCONTINUED | OUTPATIENT
Start: 2020-01-01 | End: 2020-01-01

## 2020-01-01 RX ORDER — LANOLIN ALCOHOL/MO/W.PET/CERES
5 CREAM (GRAM) TOPICAL AT BEDTIME
Refills: 0 | Status: DISCONTINUED | OUTPATIENT
Start: 2020-01-01 | End: 2020-01-01

## 2020-01-01 RX ORDER — MELOXICAM 15 MG/1
1 TABLET ORAL
Qty: 0 | Refills: 0 | DISCHARGE

## 2020-01-01 RX ORDER — CEFEPIME 1 G/1
2000 INJECTION, POWDER, FOR SOLUTION INTRAMUSCULAR; INTRAVENOUS EVERY 8 HOURS
Refills: 0 | Status: DISCONTINUED | OUTPATIENT
Start: 2020-01-01 | End: 2020-01-01

## 2020-01-01 RX ORDER — POTASSIUM PHOSPHATE, MONOBASIC POTASSIUM PHOSPHATE, DIBASIC 236; 224 MG/ML; MG/ML
30 INJECTION, SOLUTION INTRAVENOUS ONCE
Refills: 0 | Status: COMPLETED | OUTPATIENT
Start: 2020-01-01 | End: 2020-01-01

## 2020-01-01 RX ORDER — ONDANSETRON 8 MG/1
1 TABLET, FILM COATED ORAL
Qty: 0 | Refills: 0 | DISCHARGE

## 2020-01-01 RX ORDER — SODIUM,POTASSIUM PHOSPHATES 278-250MG
1 POWDER IN PACKET (EA) ORAL EVERY 6 HOURS
Refills: 0 | Status: DISCONTINUED | OUTPATIENT
Start: 2020-01-01 | End: 2020-01-01

## 2020-01-01 RX ORDER — FOLIC ACID 0.8 MG
1 TABLET ORAL
Qty: 0 | Refills: 0 | DISCHARGE

## 2020-01-01 RX ORDER — ACYCLOVIR SODIUM 500 MG
650 VIAL (EA) INTRAVENOUS ONCE
Refills: 0 | Status: COMPLETED | OUTPATIENT
Start: 2020-01-01 | End: 2020-01-01

## 2020-01-01 RX ORDER — POLYETHYLENE GLYCOL 3350 17 G/17G
17 POWDER, FOR SOLUTION ORAL
Refills: 0 | Status: DISCONTINUED | OUTPATIENT
Start: 2020-01-01 | End: 2020-01-01

## 2020-01-01 RX ORDER — VANCOMYCIN HCL 1 G
1250 VIAL (EA) INTRAVENOUS EVERY 12 HOURS
Refills: 0 | Status: DISCONTINUED | OUTPATIENT
Start: 2020-01-01 | End: 2020-01-01

## 2020-01-01 RX ORDER — AMPICILLIN TRIHYDRATE 250 MG
2 CAPSULE ORAL ONCE
Refills: 0 | Status: COMPLETED | OUTPATIENT
Start: 2020-01-01 | End: 2020-01-01

## 2020-01-01 RX ORDER — SODIUM CHLORIDE 9 MG/ML
1000 INJECTION, SOLUTION INTRAVENOUS ONCE
Refills: 0 | Status: DISCONTINUED | OUTPATIENT
Start: 2020-01-01 | End: 2020-01-01

## 2020-01-01 RX ORDER — SODIUM CHLORIDE 9 MG/ML
500 INJECTION, SOLUTION INTRAVENOUS
Refills: 0 | Status: DISCONTINUED | OUTPATIENT
Start: 2020-01-01 | End: 2020-01-01

## 2020-01-01 RX ORDER — ACETAMINOPHEN 500 MG
975 TABLET ORAL ONCE
Refills: 0 | Status: COMPLETED | OUTPATIENT
Start: 2020-01-01 | End: 2020-01-01

## 2020-01-01 RX ORDER — ONDANSETRON 8 MG/1
4 TABLET, FILM COATED ORAL ONCE
Refills: 0 | Status: COMPLETED | OUTPATIENT
Start: 2020-01-01 | End: 2020-01-01

## 2020-01-01 RX ORDER — POTASSIUM CHLORIDE 20 MEQ
20 PACKET (EA) ORAL ONCE
Refills: 0 | Status: COMPLETED | OUTPATIENT
Start: 2020-01-01 | End: 2020-01-01

## 2020-01-01 RX ORDER — SENNA PLUS 8.6 MG/1
2 TABLET ORAL AT BEDTIME
Refills: 0 | Status: DISCONTINUED | OUTPATIENT
Start: 2020-01-01 | End: 2020-01-01

## 2020-01-01 RX ORDER — LOPERAMIDE HCL 2 MG
1 TABLET ORAL
Qty: 0 | Refills: 0 | DISCHARGE

## 2020-01-01 RX ORDER — PANTOPRAZOLE SODIUM 20 MG/1
40 TABLET, DELAYED RELEASE ORAL
Refills: 0 | Status: DISCONTINUED | OUTPATIENT
Start: 2020-01-01 | End: 2020-01-01

## 2020-01-01 RX ORDER — SODIUM CHLORIDE 9 MG/ML
1000 INJECTION, SOLUTION INTRAVENOUS ONCE
Refills: 0 | Status: COMPLETED | OUTPATIENT
Start: 2020-01-01 | End: 2020-01-01

## 2020-01-01 RX ORDER — MAGNESIUM SULFATE 500 MG/ML
2 VIAL (ML) INJECTION EVERY 4 HOURS
Refills: 0 | Status: COMPLETED | OUTPATIENT
Start: 2020-01-01 | End: 2020-01-01

## 2020-01-01 RX ORDER — ACETAMINOPHEN 500 MG
975 TABLET ORAL EVERY 8 HOURS
Refills: 0 | Status: DISCONTINUED | OUTPATIENT
Start: 2020-01-01 | End: 2020-01-01

## 2020-01-01 RX ORDER — ENOXAPARIN SODIUM 100 MG/ML
40 INJECTION SUBCUTANEOUS DAILY
Refills: 0 | Status: DISCONTINUED | OUTPATIENT
Start: 2020-01-01 | End: 2020-01-01

## 2020-01-01 RX ORDER — MIDAZOLAM HYDROCHLORIDE 1 MG/ML
2 INJECTION, SOLUTION INTRAMUSCULAR; INTRAVENOUS ONCE
Refills: 0 | Status: DISCONTINUED | OUTPATIENT
Start: 2020-01-01 | End: 2020-01-01

## 2020-01-01 RX ORDER — HYDROMORPHONE HYDROCHLORIDE 2 MG/ML
0.5 INJECTION INTRAMUSCULAR; INTRAVENOUS; SUBCUTANEOUS
Qty: 100 | Refills: 0 | Status: DISCONTINUED | OUTPATIENT
Start: 2020-01-01 | End: 2020-01-01

## 2020-01-01 RX ORDER — ALBUMIN HUMAN 25 %
100 VIAL (ML) INTRAVENOUS ONCE
Refills: 0 | Status: COMPLETED | OUTPATIENT
Start: 2020-01-01 | End: 2020-01-01

## 2020-01-01 RX ORDER — IPRATROPIUM BROMIDE 0.2 MG/ML
500 SOLUTION, NON-ORAL INHALATION EVERY 6 HOURS
Refills: 0 | Status: DISCONTINUED | OUTPATIENT
Start: 2020-01-01 | End: 2020-01-01

## 2020-01-01 RX ORDER — BENZOCAINE AND MENTHOL 5; 1 G/100ML; G/100ML
1 LIQUID ORAL
Refills: 0 | Status: DISCONTINUED | OUTPATIENT
Start: 2020-01-01 | End: 2020-01-01

## 2020-01-01 RX ORDER — AMPICILLIN TRIHYDRATE 250 MG
2 CAPSULE ORAL EVERY 6 HOURS
Refills: 0 | Status: DISCONTINUED | OUTPATIENT
Start: 2020-01-01 | End: 2020-01-01

## 2020-01-01 RX ORDER — HYDROMORPHONE HYDROCHLORIDE 2 MG/ML
1 INJECTION INTRAMUSCULAR; INTRAVENOUS; SUBCUTANEOUS
Qty: 0 | Refills: 0 | DISCHARGE

## 2020-01-01 RX ORDER — LANOLIN ALCOHOL/MO/W.PET/CERES
2 CREAM (GRAM) TOPICAL AT BEDTIME
Refills: 0 | Status: DISCONTINUED | OUTPATIENT
Start: 2020-01-01 | End: 2020-01-01

## 2020-01-01 RX ORDER — ALBUMIN HUMAN 25 %
100 VIAL (ML) INTRAVENOUS EVERY 6 HOURS
Refills: 0 | Status: COMPLETED | OUTPATIENT
Start: 2020-01-01 | End: 2020-01-01

## 2020-01-01 RX ORDER — ACYCLOVIR SODIUM 500 MG
VIAL (EA) INTRAVENOUS
Refills: 0 | Status: DISCONTINUED | OUTPATIENT
Start: 2020-01-01 | End: 2020-01-01

## 2020-01-01 RX ORDER — PIPERACILLIN AND TAZOBACTAM 4; .5 G/20ML; G/20ML
3.38 INJECTION, POWDER, LYOPHILIZED, FOR SOLUTION INTRAVENOUS EVERY 8 HOURS
Refills: 0 | Status: COMPLETED | OUTPATIENT
Start: 2020-01-01 | End: 2020-01-01

## 2020-01-01 RX ORDER — SODIUM CHLORIDE 9 MG/ML
1000 INJECTION, SOLUTION INTRAVENOUS
Refills: 0 | Status: DISCONTINUED | OUTPATIENT
Start: 2020-01-01 | End: 2020-01-01

## 2020-01-01 RX ORDER — HYDROMORPHONE HYDROCHLORIDE 2 MG/ML
4 INJECTION INTRAMUSCULAR; INTRAVENOUS; SUBCUTANEOUS EVERY 6 HOURS
Refills: 0 | Status: DISCONTINUED | OUTPATIENT
Start: 2020-01-01 | End: 2020-01-01

## 2020-01-01 RX ORDER — SODIUM CHLORIDE 9 MG/ML
1000 INJECTION INTRAMUSCULAR; INTRAVENOUS; SUBCUTANEOUS ONCE
Refills: 0 | Status: COMPLETED | OUTPATIENT
Start: 2020-01-01 | End: 2020-01-01

## 2020-01-01 RX ORDER — MIDODRINE HYDROCHLORIDE 2.5 MG/1
2 TABLET ORAL
Qty: 0 | Refills: 0 | DISCHARGE
Start: 2020-01-01

## 2020-01-01 RX ORDER — TETANUS TOXOID, REDUCED DIPHTHERIA TOXOID AND ACELLULAR PERTUSSIS VACCINE, ADSORBED 5; 2.5; 8; 8; 2.5 [IU]/.5ML; [IU]/.5ML; UG/.5ML; UG/.5ML; UG/.5ML
0.5 SUSPENSION INTRAMUSCULAR ONCE
Refills: 0 | Status: COMPLETED | OUTPATIENT
Start: 2020-01-01 | End: 2020-01-01

## 2020-01-01 RX ORDER — AMOXICILLIN 250 MG/5ML
1 SUSPENSION, RECONSTITUTED, ORAL (ML) ORAL
Qty: 21 | Refills: 0
Start: 2020-01-01 | End: 2020-01-01

## 2020-01-01 RX ORDER — MAGNESIUM SULFATE 500 MG/ML
2 VIAL (ML) INJECTION ONCE
Refills: 0 | Status: COMPLETED | OUTPATIENT
Start: 2020-01-01 | End: 2020-01-01

## 2020-01-01 RX ORDER — SODIUM,POTASSIUM PHOSPHATES 278-250MG
2 POWDER IN PACKET (EA) ORAL
Refills: 0 | Status: COMPLETED | OUTPATIENT
Start: 2020-01-01 | End: 2020-01-01

## 2020-01-01 RX ORDER — SENNA PLUS 8.6 MG/1
5 TABLET ORAL DAILY
Refills: 0 | Status: DISCONTINUED | OUTPATIENT
Start: 2020-01-01 | End: 2020-01-01

## 2020-01-01 RX ORDER — ACETAMINOPHEN 500 MG
1 TABLET ORAL
Qty: 0 | Refills: 0 | DISCHARGE

## 2020-01-01 RX ADMIN — CHLORHEXIDINE GLUCONATE 1 APPLICATION(S): 213 SOLUTION TOPICAL at 05:06

## 2020-01-01 RX ADMIN — PANTOPRAZOLE SODIUM 40 MILLIGRAM(S): 20 TABLET, DELAYED RELEASE ORAL at 06:15

## 2020-01-01 RX ADMIN — MIDODRINE HYDROCHLORIDE 20 MILLIGRAM(S): 2.5 TABLET ORAL at 21:18

## 2020-01-01 RX ADMIN — SIMETHICONE 80 MILLIGRAM(S): 80 TABLET, CHEWABLE ORAL at 19:22

## 2020-01-01 RX ADMIN — FENTANYL CITRATE 1 PATCH: 50 INJECTION INTRAVENOUS at 20:00

## 2020-01-01 RX ADMIN — SODIUM CHLORIDE 3 MILLILITER(S): 9 INJECTION INTRAMUSCULAR; INTRAVENOUS; SUBCUTANEOUS at 05:45

## 2020-01-01 RX ADMIN — PIPERACILLIN AND TAZOBACTAM 25 GRAM(S): 4; .5 INJECTION, POWDER, LYOPHILIZED, FOR SOLUTION INTRAVENOUS at 23:25

## 2020-01-01 RX ADMIN — Medication 50 GRAM(S): at 01:43

## 2020-01-01 RX ADMIN — Medication 216 GRAM(S): at 12:17

## 2020-01-01 RX ADMIN — FENTANYL CITRATE 1 PATCH: 50 INJECTION INTRAVENOUS at 08:58

## 2020-01-01 RX ADMIN — FAMOTIDINE 20 MILLIGRAM(S): 10 INJECTION INTRAVENOUS at 18:15

## 2020-01-01 RX ADMIN — PIPERACILLIN AND TAZOBACTAM 25 GRAM(S): 4; .5 INJECTION, POWDER, LYOPHILIZED, FOR SOLUTION INTRAVENOUS at 21:34

## 2020-01-01 RX ADMIN — PIPERACILLIN AND TAZOBACTAM 200 GRAM(S): 4; .5 INJECTION, POWDER, LYOPHILIZED, FOR SOLUTION INTRAVENOUS at 12:13

## 2020-01-01 RX ADMIN — SIMETHICONE 80 MILLIGRAM(S): 80 TABLET, CHEWABLE ORAL at 12:02

## 2020-01-01 RX ADMIN — PANTOPRAZOLE SODIUM 40 MILLIGRAM(S): 20 TABLET, DELAYED RELEASE ORAL at 05:30

## 2020-01-01 RX ADMIN — PIPERACILLIN AND TAZOBACTAM 25 GRAM(S): 4; .5 INJECTION, POWDER, LYOPHILIZED, FOR SOLUTION INTRAVENOUS at 06:28

## 2020-01-01 RX ADMIN — Medication 5 MILLIGRAM(S): at 21:19

## 2020-01-01 RX ADMIN — CHLORHEXIDINE GLUCONATE 1 APPLICATION(S): 213 SOLUTION TOPICAL at 06:10

## 2020-01-01 RX ADMIN — Medication 100 MILLIGRAM(S): at 18:41

## 2020-01-01 RX ADMIN — SENNA PLUS 2 TABLET(S): 8.6 TABLET ORAL at 21:21

## 2020-01-01 RX ADMIN — Medication 250 MILLIGRAM(S): at 10:35

## 2020-01-01 RX ADMIN — HYDROMORPHONE HYDROCHLORIDE 1 MILLIGRAM(S): 2 INJECTION INTRAMUSCULAR; INTRAVENOUS; SUBCUTANEOUS at 02:08

## 2020-01-01 RX ADMIN — Medication 650 MILLIGRAM(S): at 20:27

## 2020-01-01 RX ADMIN — FENTANYL CITRATE 1 PATCH: 50 INJECTION INTRAVENOUS at 17:16

## 2020-01-01 RX ADMIN — ONDANSETRON 4 MILLIGRAM(S): 8 TABLET, FILM COATED ORAL at 17:25

## 2020-01-01 RX ADMIN — PIPERACILLIN AND TAZOBACTAM 25 GRAM(S): 4; .5 INJECTION, POWDER, LYOPHILIZED, FOR SOLUTION INTRAVENOUS at 05:20

## 2020-01-01 RX ADMIN — Medication 50 MILLILITER(S): at 21:29

## 2020-01-01 RX ADMIN — SODIUM CHLORIDE 3 MILLILITER(S): 9 INJECTION INTRAMUSCULAR; INTRAVENOUS; SUBCUTANEOUS at 05:22

## 2020-01-01 RX ADMIN — PIPERACILLIN AND TAZOBACTAM 25 GRAM(S): 4; .5 INJECTION, POWDER, LYOPHILIZED, FOR SOLUTION INTRAVENOUS at 05:56

## 2020-01-01 RX ADMIN — Medication 100 MILLIGRAM(S): at 14:20

## 2020-01-01 RX ADMIN — Medication 5 MILLIGRAM(S): at 12:14

## 2020-01-01 RX ADMIN — ONDANSETRON 4 MILLIGRAM(S): 8 TABLET, FILM COATED ORAL at 11:38

## 2020-01-01 RX ADMIN — ONDANSETRON 4 MILLIGRAM(S): 8 TABLET, FILM COATED ORAL at 19:32

## 2020-01-01 RX ADMIN — ONDANSETRON 4 MILLIGRAM(S): 8 TABLET, FILM COATED ORAL at 21:06

## 2020-01-01 RX ADMIN — Medication 250 MILLIGRAM(S): at 12:02

## 2020-01-01 RX ADMIN — MIDODRINE HYDROCHLORIDE 20 MILLIGRAM(S): 2.5 TABLET ORAL at 15:27

## 2020-01-01 RX ADMIN — PIPERACILLIN AND TAZOBACTAM 25 GRAM(S): 4; .5 INJECTION, POWDER, LYOPHILIZED, FOR SOLUTION INTRAVENOUS at 04:25

## 2020-01-01 RX ADMIN — PIPERACILLIN AND TAZOBACTAM 25 GRAM(S): 4; .5 INJECTION, POWDER, LYOPHILIZED, FOR SOLUTION INTRAVENOUS at 05:51

## 2020-01-01 RX ADMIN — MIDODRINE HYDROCHLORIDE 20 MILLIGRAM(S): 2.5 TABLET ORAL at 12:51

## 2020-01-01 RX ADMIN — Medication 100 MILLIGRAM(S): at 12:08

## 2020-01-01 RX ADMIN — Medication 216 GRAM(S): at 17:57

## 2020-01-01 RX ADMIN — FENTANYL CITRATE 1 PATCH: 50 INJECTION INTRAVENOUS at 19:56

## 2020-01-01 RX ADMIN — Medication 263 MILLIGRAM(S): at 14:18

## 2020-01-01 RX ADMIN — PANTOPRAZOLE SODIUM 40 MILLIGRAM(S): 20 TABLET, DELAYED RELEASE ORAL at 08:57

## 2020-01-01 RX ADMIN — Medication 2 MILLIGRAM(S): at 17:50

## 2020-01-01 RX ADMIN — MIDODRINE HYDROCHLORIDE 20 MILLIGRAM(S): 2.5 TABLET ORAL at 07:50

## 2020-01-01 RX ADMIN — ENOXAPARIN SODIUM 40 MILLIGRAM(S): 100 INJECTION SUBCUTANEOUS at 12:21

## 2020-01-01 RX ADMIN — SODIUM CHLORIDE 1000 MILLILITER(S): 9 INJECTION, SOLUTION INTRAVENOUS at 15:58

## 2020-01-01 RX ADMIN — PIPERACILLIN AND TAZOBACTAM 25 GRAM(S): 4; .5 INJECTION, POWDER, LYOPHILIZED, FOR SOLUTION INTRAVENOUS at 05:25

## 2020-01-01 RX ADMIN — Medication 216 GRAM(S): at 18:02

## 2020-01-01 RX ADMIN — Medication 50 MILLILITER(S): at 17:31

## 2020-01-01 RX ADMIN — HYDROMORPHONE HYDROCHLORIDE 4 MILLIGRAM(S): 2 INJECTION INTRAMUSCULAR; INTRAVENOUS; SUBCUTANEOUS at 10:05

## 2020-01-01 RX ADMIN — Medication 63.75 MILLIMOLE(S): at 19:46

## 2020-01-01 RX ADMIN — Medication 650 MILLIGRAM(S): at 01:17

## 2020-01-01 RX ADMIN — PANTOPRAZOLE SODIUM 40 MILLIGRAM(S): 20 TABLET, DELAYED RELEASE ORAL at 10:25

## 2020-01-01 RX ADMIN — PIPERACILLIN AND TAZOBACTAM 25 GRAM(S): 4; .5 INJECTION, POWDER, LYOPHILIZED, FOR SOLUTION INTRAVENOUS at 21:15

## 2020-01-01 RX ADMIN — SIMETHICONE 80 MILLIGRAM(S): 80 TABLET, CHEWABLE ORAL at 12:14

## 2020-01-01 RX ADMIN — Medication 100 MILLIGRAM(S): at 03:00

## 2020-01-01 RX ADMIN — MIDODRINE HYDROCHLORIDE 20 MILLIGRAM(S): 2.5 TABLET ORAL at 18:06

## 2020-01-01 RX ADMIN — CHLORHEXIDINE GLUCONATE 1 APPLICATION(S): 213 SOLUTION TOPICAL at 13:03

## 2020-01-01 RX ADMIN — SODIUM CHLORIDE 3 MILLILITER(S): 9 INJECTION INTRAMUSCULAR; INTRAVENOUS; SUBCUTANEOUS at 12:58

## 2020-01-01 RX ADMIN — Medication 166.67 MILLIGRAM(S): at 05:12

## 2020-01-01 RX ADMIN — Medication 113 MILLIGRAM(S): at 01:59

## 2020-01-01 RX ADMIN — FENTANYL CITRATE 1 PATCH: 50 INJECTION INTRAVENOUS at 17:03

## 2020-01-01 RX ADMIN — HYDROMORPHONE HYDROCHLORIDE 1 MILLIGRAM(S): 2 INJECTION INTRAMUSCULAR; INTRAVENOUS; SUBCUTANEOUS at 23:48

## 2020-01-01 RX ADMIN — PANTOPRAZOLE SODIUM 40 MILLIGRAM(S): 20 TABLET, DELAYED RELEASE ORAL at 07:45

## 2020-01-01 RX ADMIN — Medication 30 MILLILITER(S): at 20:25

## 2020-01-01 RX ADMIN — ONDANSETRON 4 MILLIGRAM(S): 8 TABLET, FILM COATED ORAL at 08:23

## 2020-01-01 RX ADMIN — HYDROMORPHONE HYDROCHLORIDE 4 MILLIGRAM(S): 2 INJECTION INTRAMUSCULAR; INTRAVENOUS; SUBCUTANEOUS at 05:23

## 2020-01-01 RX ADMIN — PIPERACILLIN AND TAZOBACTAM 25 GRAM(S): 4; .5 INJECTION, POWDER, LYOPHILIZED, FOR SOLUTION INTRAVENOUS at 05:05

## 2020-01-01 RX ADMIN — Medication 40 MILLIEQUIVALENT(S): at 17:31

## 2020-01-01 RX ADMIN — Medication 166.67 MILLIGRAM(S): at 00:58

## 2020-01-01 RX ADMIN — Medication 5 MILLIGRAM(S): at 23:39

## 2020-01-01 RX ADMIN — MIDODRINE HYDROCHLORIDE 20 MILLIGRAM(S): 2.5 TABLET ORAL at 05:05

## 2020-01-01 RX ADMIN — ONDANSETRON 4 MILLIGRAM(S): 8 TABLET, FILM COATED ORAL at 08:53

## 2020-01-01 RX ADMIN — PIPERACILLIN AND TAZOBACTAM 25 GRAM(S): 4; .5 INJECTION, POWDER, LYOPHILIZED, FOR SOLUTION INTRAVENOUS at 04:15

## 2020-01-01 RX ADMIN — Medication 100 GRAM(S): at 21:36

## 2020-01-01 RX ADMIN — ENOXAPARIN SODIUM 40 MILLIGRAM(S): 100 INJECTION SUBCUTANEOUS at 12:02

## 2020-01-01 RX ADMIN — Medication 216 GRAM(S): at 11:14

## 2020-01-01 RX ADMIN — Medication 1 TABLET(S): at 09:03

## 2020-01-01 RX ADMIN — PIPERACILLIN AND TAZOBACTAM 25 GRAM(S): 4; .5 INJECTION, POWDER, LYOPHILIZED, FOR SOLUTION INTRAVENOUS at 06:07

## 2020-01-01 RX ADMIN — PIPERACILLIN AND TAZOBACTAM 25 GRAM(S): 4; .5 INJECTION, POWDER, LYOPHILIZED, FOR SOLUTION INTRAVENOUS at 21:41

## 2020-01-01 RX ADMIN — TETANUS TOXOID, REDUCED DIPHTHERIA TOXOID AND ACELLULAR PERTUSSIS VACCINE, ADSORBED 0.5 MILLILITER(S): 5; 2.5; 8; 8; 2.5 SUSPENSION INTRAMUSCULAR at 21:09

## 2020-01-01 RX ADMIN — HYDROMORPHONE HYDROCHLORIDE 1 MILLIGRAM(S): 2 INJECTION INTRAMUSCULAR; INTRAVENOUS; SUBCUTANEOUS at 15:01

## 2020-01-01 RX ADMIN — PIPERACILLIN AND TAZOBACTAM 25 GRAM(S): 4; .5 INJECTION, POWDER, LYOPHILIZED, FOR SOLUTION INTRAVENOUS at 21:25

## 2020-01-01 RX ADMIN — CHLORHEXIDINE GLUCONATE 1 APPLICATION(S): 213 SOLUTION TOPICAL at 10:41

## 2020-01-01 RX ADMIN — Medication 263 MILLIGRAM(S): at 05:07

## 2020-01-01 RX ADMIN — PANTOPRAZOLE SODIUM 40 MILLIGRAM(S): 20 TABLET, DELAYED RELEASE ORAL at 06:28

## 2020-01-01 RX ADMIN — PIPERACILLIN AND TAZOBACTAM 25 GRAM(S): 4; .5 INJECTION, POWDER, LYOPHILIZED, FOR SOLUTION INTRAVENOUS at 22:40

## 2020-01-01 RX ADMIN — HYDROMORPHONE HYDROCHLORIDE 30 MILLILITER(S): 2 INJECTION INTRAMUSCULAR; INTRAVENOUS; SUBCUTANEOUS at 09:29

## 2020-01-01 RX ADMIN — Medication 50 GRAM(S): at 12:37

## 2020-01-01 RX ADMIN — SENNA PLUS 5 MILLILITER(S): 8.6 TABLET ORAL at 12:00

## 2020-01-01 RX ADMIN — HYDROMORPHONE HYDROCHLORIDE 1 MILLIGRAM(S): 2 INJECTION INTRAMUSCULAR; INTRAVENOUS; SUBCUTANEOUS at 04:45

## 2020-01-01 RX ADMIN — MIDODRINE HYDROCHLORIDE 20 MILLIGRAM(S): 2.5 TABLET ORAL at 17:25

## 2020-01-01 RX ADMIN — Medication 50 MILLILITER(S): at 17:46

## 2020-01-01 RX ADMIN — HYDROMORPHONE HYDROCHLORIDE 4 MILLIGRAM(S): 2 INJECTION INTRAMUSCULAR; INTRAVENOUS; SUBCUTANEOUS at 14:29

## 2020-01-01 RX ADMIN — CHLORHEXIDINE GLUCONATE 1 APPLICATION(S): 213 SOLUTION TOPICAL at 05:13

## 2020-01-01 RX ADMIN — PIPERACILLIN AND TAZOBACTAM 25 GRAM(S): 4; .5 INJECTION, POWDER, LYOPHILIZED, FOR SOLUTION INTRAVENOUS at 15:02

## 2020-01-01 RX ADMIN — ONDANSETRON 4 MILLIGRAM(S): 8 TABLET, FILM COATED ORAL at 09:01

## 2020-01-01 RX ADMIN — MIDODRINE HYDROCHLORIDE 20 MILLIGRAM(S): 2.5 TABLET ORAL at 15:57

## 2020-01-01 RX ADMIN — PIPERACILLIN AND TAZOBACTAM 25 GRAM(S): 4; .5 INJECTION, POWDER, LYOPHILIZED, FOR SOLUTION INTRAVENOUS at 20:25

## 2020-01-01 RX ADMIN — FAMOTIDINE 20 MILLIGRAM(S): 10 INJECTION INTRAVENOUS at 17:55

## 2020-01-01 RX ADMIN — ONDANSETRON 4 MILLIGRAM(S): 8 TABLET, FILM COATED ORAL at 11:01

## 2020-01-01 RX ADMIN — Medication 650 MILLIGRAM(S): at 14:15

## 2020-01-01 RX ADMIN — ONDANSETRON 4 MILLIGRAM(S): 8 TABLET, FILM COATED ORAL at 10:25

## 2020-01-01 RX ADMIN — HYDROMORPHONE HYDROCHLORIDE 30 MILLILITER(S): 2 INJECTION INTRAMUSCULAR; INTRAVENOUS; SUBCUTANEOUS at 19:08

## 2020-01-01 RX ADMIN — Medication 50 MILLILITER(S): at 10:39

## 2020-01-01 RX ADMIN — HYDROMORPHONE HYDROCHLORIDE 1 MILLIGRAM(S): 2 INJECTION INTRAMUSCULAR; INTRAVENOUS; SUBCUTANEOUS at 18:00

## 2020-01-01 RX ADMIN — SODIUM CHLORIDE 50 MILLILITER(S): 9 INJECTION INTRAMUSCULAR; INTRAVENOUS; SUBCUTANEOUS at 21:15

## 2020-01-01 RX ADMIN — HYDROMORPHONE HYDROCHLORIDE 30 MILLILITER(S): 2 INJECTION INTRAMUSCULAR; INTRAVENOUS; SUBCUTANEOUS at 20:08

## 2020-01-01 RX ADMIN — CHLORHEXIDINE GLUCONATE 1 APPLICATION(S): 213 SOLUTION TOPICAL at 20:09

## 2020-01-01 RX ADMIN — PIPERACILLIN AND TAZOBACTAM 25 GRAM(S): 4; .5 INJECTION, POWDER, LYOPHILIZED, FOR SOLUTION INTRAVENOUS at 07:45

## 2020-01-01 RX ADMIN — Medication 20 MILLIEQUIVALENT(S): at 15:22

## 2020-01-01 RX ADMIN — ENOXAPARIN SODIUM 30 MILLIGRAM(S): 100 INJECTION SUBCUTANEOUS at 20:36

## 2020-01-01 RX ADMIN — HYDROMORPHONE HYDROCHLORIDE 4 MILLIGRAM(S): 2 INJECTION INTRAMUSCULAR; INTRAVENOUS; SUBCUTANEOUS at 02:19

## 2020-01-01 RX ADMIN — HYDROMORPHONE HYDROCHLORIDE 1 MILLIGRAM(S): 2 INJECTION INTRAMUSCULAR; INTRAVENOUS; SUBCUTANEOUS at 11:29

## 2020-01-01 RX ADMIN — Medication 263 MILLIGRAM(S): at 06:42

## 2020-01-01 RX ADMIN — PIPERACILLIN AND TAZOBACTAM 25 GRAM(S): 4; .5 INJECTION, POWDER, LYOPHILIZED, FOR SOLUTION INTRAVENOUS at 11:54

## 2020-01-01 RX ADMIN — Medication 2 PACKET(S): at 13:44

## 2020-01-01 RX ADMIN — HYDROMORPHONE HYDROCHLORIDE 30 MILLILITER(S): 2 INJECTION INTRAMUSCULAR; INTRAVENOUS; SUBCUTANEOUS at 19:41

## 2020-01-01 RX ADMIN — Medication 100 MILLIGRAM(S): at 18:13

## 2020-01-01 RX ADMIN — HYDROMORPHONE HYDROCHLORIDE 30 MILLILITER(S): 2 INJECTION INTRAMUSCULAR; INTRAVENOUS; SUBCUTANEOUS at 17:17

## 2020-01-01 RX ADMIN — Medication 83.33 MILLIMOLE(S): at 01:49

## 2020-01-01 RX ADMIN — PANTOPRAZOLE SODIUM 40 MILLIGRAM(S): 20 TABLET, DELAYED RELEASE ORAL at 06:07

## 2020-01-01 RX ADMIN — HYDROMORPHONE HYDROCHLORIDE 30 MILLILITER(S): 2 INJECTION INTRAMUSCULAR; INTRAVENOUS; SUBCUTANEOUS at 06:09

## 2020-01-01 RX ADMIN — Medication 40 MILLIGRAM(S): at 05:22

## 2020-01-01 RX ADMIN — HYDROMORPHONE HYDROCHLORIDE 1 MILLIGRAM(S): 2 INJECTION INTRAMUSCULAR; INTRAVENOUS; SUBCUTANEOUS at 12:29

## 2020-01-01 RX ADMIN — MIDODRINE HYDROCHLORIDE 20 MILLIGRAM(S): 2.5 TABLET ORAL at 11:41

## 2020-01-01 RX ADMIN — PIPERACILLIN AND TAZOBACTAM 25 GRAM(S): 4; .5 INJECTION, POWDER, LYOPHILIZED, FOR SOLUTION INTRAVENOUS at 07:05

## 2020-01-01 RX ADMIN — ENOXAPARIN SODIUM 40 MILLIGRAM(S): 100 INJECTION SUBCUTANEOUS at 13:56

## 2020-01-01 RX ADMIN — ONDANSETRON 4 MILLIGRAM(S): 8 TABLET, FILM COATED ORAL at 13:40

## 2020-01-01 RX ADMIN — CEFEPIME 100 MILLIGRAM(S): 1 INJECTION, POWDER, FOR SOLUTION INTRAMUSCULAR; INTRAVENOUS at 09:57

## 2020-01-01 RX ADMIN — MIDODRINE HYDROCHLORIDE 20 MILLIGRAM(S): 2.5 TABLET ORAL at 13:28

## 2020-01-01 RX ADMIN — PIPERACILLIN AND TAZOBACTAM 25 GRAM(S): 4; .5 INJECTION, POWDER, LYOPHILIZED, FOR SOLUTION INTRAVENOUS at 13:32

## 2020-01-01 RX ADMIN — SODIUM CHLORIDE 50 MILLILITER(S): 9 INJECTION, SOLUTION INTRAVENOUS at 03:57

## 2020-01-01 RX ADMIN — SODIUM CHLORIDE 3 MILLILITER(S): 9 INJECTION INTRAMUSCULAR; INTRAVENOUS; SUBCUTANEOUS at 05:21

## 2020-01-01 RX ADMIN — SODIUM CHLORIDE 3 MILLILITER(S): 9 INJECTION INTRAMUSCULAR; INTRAVENOUS; SUBCUTANEOUS at 21:08

## 2020-01-01 RX ADMIN — MIDODRINE HYDROCHLORIDE 20 MILLIGRAM(S): 2.5 TABLET ORAL at 21:04

## 2020-01-01 RX ADMIN — PANTOPRAZOLE SODIUM 40 MILLIGRAM(S): 20 TABLET, DELAYED RELEASE ORAL at 05:07

## 2020-01-01 RX ADMIN — HYDROMORPHONE HYDROCHLORIDE 1 MILLIGRAM(S): 2 INJECTION INTRAMUSCULAR; INTRAVENOUS; SUBCUTANEOUS at 17:22

## 2020-01-01 RX ADMIN — Medication 250 MILLIGRAM(S): at 12:37

## 2020-01-01 RX ADMIN — SODIUM CHLORIDE 1000 MILLILITER(S): 9 INJECTION, SOLUTION INTRAVENOUS at 14:01

## 2020-01-01 RX ADMIN — FENTANYL CITRATE 1 PATCH: 50 INJECTION INTRAVENOUS at 18:02

## 2020-01-01 RX ADMIN — ONDANSETRON 4 MILLIGRAM(S): 8 TABLET, FILM COATED ORAL at 12:13

## 2020-01-01 RX ADMIN — HYDROMORPHONE HYDROCHLORIDE 0.5 MILLIGRAM(S): 2 INJECTION INTRAMUSCULAR; INTRAVENOUS; SUBCUTANEOUS at 20:25

## 2020-01-01 RX ADMIN — Medication 250 MILLIGRAM(S): at 21:09

## 2020-01-01 RX ADMIN — Medication 5 MILLIGRAM(S): at 12:44

## 2020-01-01 RX ADMIN — FENTANYL CITRATE 50 MICROGRAM(S): 50 INJECTION INTRAVENOUS at 10:29

## 2020-01-01 RX ADMIN — FENTANYL CITRATE 1 PATCH: 50 INJECTION INTRAVENOUS at 06:15

## 2020-01-01 RX ADMIN — SIMETHICONE 80 MILLIGRAM(S): 80 TABLET, CHEWABLE ORAL at 11:45

## 2020-01-01 RX ADMIN — MIDODRINE HYDROCHLORIDE 20 MILLIGRAM(S): 2.5 TABLET ORAL at 20:44

## 2020-01-01 RX ADMIN — Medication 263 MILLIGRAM(S): at 14:38

## 2020-01-01 RX ADMIN — MIDODRINE HYDROCHLORIDE 20 MILLIGRAM(S): 2.5 TABLET ORAL at 05:26

## 2020-01-01 RX ADMIN — Medication 216 GRAM(S): at 23:43

## 2020-01-01 RX ADMIN — MIDODRINE HYDROCHLORIDE 20 MILLIGRAM(S): 2.5 TABLET ORAL at 17:39

## 2020-01-01 RX ADMIN — HYDROMORPHONE HYDROCHLORIDE 1 MILLIGRAM(S): 2 INJECTION INTRAMUSCULAR; INTRAVENOUS; SUBCUTANEOUS at 01:49

## 2020-01-01 RX ADMIN — Medication 263 MILLIGRAM(S): at 05:29

## 2020-01-01 RX ADMIN — SODIUM CHLORIDE 3 MILLILITER(S): 9 INJECTION INTRAMUSCULAR; INTRAVENOUS; SUBCUTANEOUS at 12:26

## 2020-01-01 RX ADMIN — FAMOTIDINE 20 MILLIGRAM(S): 10 INJECTION INTRAVENOUS at 17:59

## 2020-01-01 RX ADMIN — CHLORHEXIDINE GLUCONATE 1 APPLICATION(S): 213 SOLUTION TOPICAL at 11:30

## 2020-01-01 RX ADMIN — PIPERACILLIN AND TAZOBACTAM 25 GRAM(S): 4; .5 INJECTION, POWDER, LYOPHILIZED, FOR SOLUTION INTRAVENOUS at 12:22

## 2020-01-01 RX ADMIN — PANTOPRAZOLE SODIUM 40 MILLIGRAM(S): 20 TABLET, DELAYED RELEASE ORAL at 05:51

## 2020-01-01 RX ADMIN — HYDROMORPHONE HYDROCHLORIDE 30 MILLILITER(S): 2 INJECTION INTRAMUSCULAR; INTRAVENOUS; SUBCUTANEOUS at 10:37

## 2020-01-01 RX ADMIN — HYDROMORPHONE HYDROCHLORIDE 1 MILLIGRAM(S): 2 INJECTION INTRAMUSCULAR; INTRAVENOUS; SUBCUTANEOUS at 14:46

## 2020-01-01 RX ADMIN — Medication 216 GRAM(S): at 13:57

## 2020-01-01 RX ADMIN — Medication 216 GRAM(S): at 23:29

## 2020-01-01 RX ADMIN — PIPERACILLIN AND TAZOBACTAM 25 GRAM(S): 4; .5 INJECTION, POWDER, LYOPHILIZED, FOR SOLUTION INTRAVENOUS at 20:27

## 2020-01-01 RX ADMIN — ENOXAPARIN SODIUM 40 MILLIGRAM(S): 100 INJECTION SUBCUTANEOUS at 13:04

## 2020-01-01 RX ADMIN — Medication 40 MILLIGRAM(S): at 05:36

## 2020-01-01 RX ADMIN — Medication 1 TABLET(S): at 11:45

## 2020-01-01 RX ADMIN — HYDROMORPHONE HYDROCHLORIDE 1 MILLIGRAM(S): 2 INJECTION INTRAMUSCULAR; INTRAVENOUS; SUBCUTANEOUS at 11:47

## 2020-01-01 RX ADMIN — HYDROMORPHONE HYDROCHLORIDE 4 MILLIGRAM(S): 2 INJECTION INTRAMUSCULAR; INTRAVENOUS; SUBCUTANEOUS at 03:56

## 2020-01-01 RX ADMIN — FENTANYL CITRATE 1 PATCH: 50 INJECTION INTRAVENOUS at 07:00

## 2020-01-01 RX ADMIN — PIPERACILLIN AND TAZOBACTAM 25 GRAM(S): 4; .5 INJECTION, POWDER, LYOPHILIZED, FOR SOLUTION INTRAVENOUS at 14:27

## 2020-01-01 RX ADMIN — Medication 100 MILLIGRAM(S): at 19:11

## 2020-01-01 RX ADMIN — PIPERACILLIN AND TAZOBACTAM 25 GRAM(S): 4; .5 INJECTION, POWDER, LYOPHILIZED, FOR SOLUTION INTRAVENOUS at 21:05

## 2020-01-01 RX ADMIN — HYDROMORPHONE HYDROCHLORIDE 4 MILLIGRAM(S): 2 INJECTION INTRAMUSCULAR; INTRAVENOUS; SUBCUTANEOUS at 18:00

## 2020-01-01 RX ADMIN — MIDODRINE HYDROCHLORIDE 20 MILLIGRAM(S): 2.5 TABLET ORAL at 09:30

## 2020-01-01 RX ADMIN — Medication 6.81 MICROGRAM(S)/KG/MIN: at 20:34

## 2020-01-01 RX ADMIN — Medication 50 MILLILITER(S): at 23:13

## 2020-01-01 RX ADMIN — SODIUM CHLORIDE 500 MILLILITER(S): 9 INJECTION, SOLUTION INTRAVENOUS at 00:20

## 2020-01-01 RX ADMIN — PIPERACILLIN AND TAZOBACTAM 25 GRAM(S): 4; .5 INJECTION, POWDER, LYOPHILIZED, FOR SOLUTION INTRAVENOUS at 12:02

## 2020-01-01 RX ADMIN — SODIUM CHLORIDE 3 MILLILITER(S): 9 INJECTION INTRAMUSCULAR; INTRAVENOUS; SUBCUTANEOUS at 21:25

## 2020-01-01 RX ADMIN — Medication 40 MILLIEQUIVALENT(S): at 05:53

## 2020-01-01 RX ADMIN — Medication 1 TABLET(S): at 18:05

## 2020-01-01 RX ADMIN — PANTOPRAZOLE SODIUM 40 MILLIGRAM(S): 20 TABLET, DELAYED RELEASE ORAL at 05:12

## 2020-01-01 RX ADMIN — HYDROMORPHONE HYDROCHLORIDE 1 MILLIGRAM(S): 2 INJECTION INTRAMUSCULAR; INTRAVENOUS; SUBCUTANEOUS at 11:44

## 2020-01-01 RX ADMIN — CHLORHEXIDINE GLUCONATE 1 APPLICATION(S): 213 SOLUTION TOPICAL at 06:36

## 2020-01-01 RX ADMIN — HYDROMORPHONE HYDROCHLORIDE 4 MILLIGRAM(S): 2 INJECTION INTRAMUSCULAR; INTRAVENOUS; SUBCUTANEOUS at 21:16

## 2020-01-01 RX ADMIN — PANTOPRAZOLE SODIUM 40 MILLIGRAM(S): 20 TABLET, DELAYED RELEASE ORAL at 09:29

## 2020-01-01 RX ADMIN — PIPERACILLIN AND TAZOBACTAM 25 GRAM(S): 4; .5 INJECTION, POWDER, LYOPHILIZED, FOR SOLUTION INTRAVENOUS at 15:54

## 2020-01-01 RX ADMIN — Medication 50 MILLILITER(S): at 14:04

## 2020-01-01 RX ADMIN — HYDROMORPHONE HYDROCHLORIDE 30 MILLILITER(S): 2 INJECTION INTRAMUSCULAR; INTRAVENOUS; SUBCUTANEOUS at 18:55

## 2020-01-01 RX ADMIN — Medication 0.5 MILLIGRAM(S): at 23:43

## 2020-01-01 RX ADMIN — SIMETHICONE 160 MILLIGRAM(S): 80 TABLET, CHEWABLE ORAL at 10:51

## 2020-01-01 RX ADMIN — PIPERACILLIN AND TAZOBACTAM 200 GRAM(S): 4; .5 INJECTION, POWDER, LYOPHILIZED, FOR SOLUTION INTRAVENOUS at 20:38

## 2020-01-01 RX ADMIN — Medication 40 MILLIGRAM(S): at 06:22

## 2020-01-01 RX ADMIN — POLYETHYLENE GLYCOL 3350 17 GRAM(S): 17 POWDER, FOR SOLUTION ORAL at 17:40

## 2020-01-01 RX ADMIN — SODIUM CHLORIDE 3 MILLILITER(S): 9 INJECTION INTRAMUSCULAR; INTRAVENOUS; SUBCUTANEOUS at 21:06

## 2020-01-01 RX ADMIN — PIPERACILLIN AND TAZOBACTAM 200 GRAM(S): 4; .5 INJECTION, POWDER, LYOPHILIZED, FOR SOLUTION INTRAVENOUS at 19:57

## 2020-01-01 RX ADMIN — PIPERACILLIN AND TAZOBACTAM 25 GRAM(S): 4; .5 INJECTION, POWDER, LYOPHILIZED, FOR SOLUTION INTRAVENOUS at 14:03

## 2020-01-01 RX ADMIN — HYDROMORPHONE HYDROCHLORIDE 1 MILLIGRAM(S): 2 INJECTION INTRAMUSCULAR; INTRAVENOUS; SUBCUTANEOUS at 03:42

## 2020-01-01 RX ADMIN — HYDROMORPHONE HYDROCHLORIDE 1 MILLIGRAM(S): 2 INJECTION INTRAMUSCULAR; INTRAVENOUS; SUBCUTANEOUS at 00:40

## 2020-01-01 RX ADMIN — Medication 5 MILLIGRAM(S): at 21:15

## 2020-01-01 RX ADMIN — SODIUM CHLORIDE 1000 MILLILITER(S): 9 INJECTION INTRAMUSCULAR; INTRAVENOUS; SUBCUTANEOUS at 09:57

## 2020-01-01 RX ADMIN — HYDROMORPHONE HYDROCHLORIDE 1 MILLIGRAM(S): 2 INJECTION INTRAMUSCULAR; INTRAVENOUS; SUBCUTANEOUS at 20:40

## 2020-01-01 RX ADMIN — HYDROMORPHONE HYDROCHLORIDE 4 MILLIGRAM(S): 2 INJECTION INTRAMUSCULAR; INTRAVENOUS; SUBCUTANEOUS at 13:32

## 2020-01-01 RX ADMIN — MIDODRINE HYDROCHLORIDE 20 MILLIGRAM(S): 2.5 TABLET ORAL at 05:12

## 2020-01-01 RX ADMIN — PANTOPRAZOLE SODIUM 40 MILLIGRAM(S): 20 TABLET, DELAYED RELEASE ORAL at 05:20

## 2020-01-01 RX ADMIN — MIDODRINE HYDROCHLORIDE 20 MILLIGRAM(S): 2.5 TABLET ORAL at 21:00

## 2020-01-01 RX ADMIN — CHLORHEXIDINE GLUCONATE 1 APPLICATION(S): 213 SOLUTION TOPICAL at 12:24

## 2020-01-01 RX ADMIN — PIPERACILLIN AND TAZOBACTAM 25 GRAM(S): 4; .5 INJECTION, POWDER, LYOPHILIZED, FOR SOLUTION INTRAVENOUS at 21:30

## 2020-01-01 RX ADMIN — HYDROMORPHONE HYDROCHLORIDE 30 MILLILITER(S): 2 INJECTION INTRAMUSCULAR; INTRAVENOUS; SUBCUTANEOUS at 07:23

## 2020-01-01 RX ADMIN — MIDODRINE HYDROCHLORIDE 20 MILLIGRAM(S): 2.5 TABLET ORAL at 05:10

## 2020-01-01 RX ADMIN — Medication 216 GRAM(S): at 06:44

## 2020-01-01 RX ADMIN — CHLORHEXIDINE GLUCONATE 1 APPLICATION(S): 213 SOLUTION TOPICAL at 05:14

## 2020-01-01 RX ADMIN — SODIUM CHLORIDE 75 MILLILITER(S): 9 INJECTION INTRAMUSCULAR; INTRAVENOUS; SUBCUTANEOUS at 15:27

## 2020-01-01 RX ADMIN — Medication 166.67 MILLIGRAM(S): at 12:14

## 2020-01-01 RX ADMIN — PIPERACILLIN AND TAZOBACTAM 25 GRAM(S): 4; .5 INJECTION, POWDER, LYOPHILIZED, FOR SOLUTION INTRAVENOUS at 16:33

## 2020-01-01 RX ADMIN — MIDODRINE HYDROCHLORIDE 20 MILLIGRAM(S): 2.5 TABLET ORAL at 22:32

## 2020-01-01 RX ADMIN — Medication 50 MILLILITER(S): at 03:34

## 2020-01-01 RX ADMIN — Medication 975 MILLIGRAM(S): at 12:13

## 2020-01-01 RX ADMIN — Medication 50 GRAM(S): at 22:30

## 2020-01-01 RX ADMIN — CHLORHEXIDINE GLUCONATE 1 APPLICATION(S): 213 SOLUTION TOPICAL at 10:26

## 2020-01-01 RX ADMIN — PIPERACILLIN AND TAZOBACTAM 25 GRAM(S): 4; .5 INJECTION, POWDER, LYOPHILIZED, FOR SOLUTION INTRAVENOUS at 06:50

## 2020-01-01 RX ADMIN — POTASSIUM PHOSPHATE, MONOBASIC POTASSIUM PHOSPHATE, DIBASIC 62.5 MILLIMOLE(S): 236; 224 INJECTION, SOLUTION INTRAVENOUS at 10:20

## 2020-01-01 RX ADMIN — PANTOPRAZOLE SODIUM 40 MILLIGRAM(S): 20 TABLET, DELAYED RELEASE ORAL at 05:35

## 2020-01-01 RX ADMIN — HYDROMORPHONE HYDROCHLORIDE 30 MILLILITER(S): 2 INJECTION INTRAMUSCULAR; INTRAVENOUS; SUBCUTANEOUS at 07:26

## 2020-01-01 RX ADMIN — Medication 100 MILLIGRAM(S): at 03:34

## 2020-01-01 RX ADMIN — PIPERACILLIN AND TAZOBACTAM 25 GRAM(S): 4; .5 INJECTION, POWDER, LYOPHILIZED, FOR SOLUTION INTRAVENOUS at 13:01

## 2020-01-01 RX ADMIN — SODIUM CHLORIDE 75 MILLILITER(S): 9 INJECTION, SOLUTION INTRAVENOUS at 20:34

## 2020-01-01 RX ADMIN — BENZOCAINE AND MENTHOL 1 LOZENGE: 5; 1 LIQUID ORAL at 23:21

## 2020-01-01 RX ADMIN — BENZOCAINE AND MENTHOL 1 LOZENGE: 5; 1 LIQUID ORAL at 21:20

## 2020-01-01 RX ADMIN — HYDROMORPHONE HYDROCHLORIDE 1 MILLIGRAM(S): 2 INJECTION INTRAMUSCULAR; INTRAVENOUS; SUBCUTANEOUS at 21:59

## 2020-01-01 RX ADMIN — Medication 6.81 MICROGRAM(S)/KG/MIN: at 10:30

## 2020-01-01 RX ADMIN — Medication 20 MILLIEQUIVALENT(S): at 16:09

## 2020-01-01 RX ADMIN — Medication 650 MILLIGRAM(S): at 14:46

## 2020-01-01 RX ADMIN — HYDROMORPHONE HYDROCHLORIDE 4 MILLIGRAM(S): 2 INJECTION INTRAMUSCULAR; INTRAVENOUS; SUBCUTANEOUS at 08:00

## 2020-01-01 RX ADMIN — Medication 50 MILLILITER(S): at 02:46

## 2020-01-01 RX ADMIN — Medication 85 MILLIMOLE(S): at 14:13

## 2020-01-01 RX ADMIN — SIMETHICONE 160 MILLIGRAM(S): 80 TABLET, CHEWABLE ORAL at 17:57

## 2020-01-01 RX ADMIN — PIPERACILLIN AND TAZOBACTAM 25 GRAM(S): 4; .5 INJECTION, POWDER, LYOPHILIZED, FOR SOLUTION INTRAVENOUS at 20:48

## 2020-01-01 RX ADMIN — POLYETHYLENE GLYCOL 3350 17 GRAM(S): 17 POWDER, FOR SOLUTION ORAL at 06:06

## 2020-01-01 RX ADMIN — MIDODRINE HYDROCHLORIDE 20 MILLIGRAM(S): 2.5 TABLET ORAL at 13:57

## 2020-01-01 RX ADMIN — SODIUM CHLORIDE 1500 MILLILITER(S): 9 INJECTION INTRAMUSCULAR; INTRAVENOUS; SUBCUTANEOUS at 12:13

## 2020-01-01 RX ADMIN — Medication 250 MILLIGRAM(S): at 22:41

## 2020-01-01 RX ADMIN — Medication 166.67 MILLIGRAM(S): at 22:43

## 2020-01-01 RX ADMIN — PIPERACILLIN AND TAZOBACTAM 25 GRAM(S): 4; .5 INJECTION, POWDER, LYOPHILIZED, FOR SOLUTION INTRAVENOUS at 05:11

## 2020-01-01 RX ADMIN — HYDROMORPHONE HYDROCHLORIDE 30 MILLILITER(S): 2 INJECTION INTRAMUSCULAR; INTRAVENOUS; SUBCUTANEOUS at 20:24

## 2020-01-01 RX ADMIN — MIDODRINE HYDROCHLORIDE 20 MILLIGRAM(S): 2.5 TABLET ORAL at 13:05

## 2020-01-01 RX ADMIN — Medication 20 MILLIEQUIVALENT(S): at 12:22

## 2020-01-01 RX ADMIN — HYDROMORPHONE HYDROCHLORIDE 1 MILLIGRAM(S): 2 INJECTION INTRAMUSCULAR; INTRAVENOUS; SUBCUTANEOUS at 20:58

## 2020-01-01 RX ADMIN — Medication 250 MILLIGRAM(S): at 10:50

## 2020-01-01 RX ADMIN — FENTANYL CITRATE 1 PATCH: 50 INJECTION INTRAVENOUS at 17:11

## 2020-01-01 RX ADMIN — HYDROMORPHONE HYDROCHLORIDE 4 MILLIGRAM(S): 2 INJECTION INTRAMUSCULAR; INTRAVENOUS; SUBCUTANEOUS at 17:11

## 2020-01-01 RX ADMIN — Medication 5 MILLIGRAM(S): at 11:30

## 2020-01-01 RX ADMIN — Medication 20 MILLIEQUIVALENT(S): at 15:27

## 2020-01-01 RX ADMIN — HYDROMORPHONE HYDROCHLORIDE 30 MILLILITER(S): 2 INJECTION INTRAMUSCULAR; INTRAVENOUS; SUBCUTANEOUS at 13:37

## 2020-01-01 RX ADMIN — PANTOPRAZOLE SODIUM 40 MILLIGRAM(S): 20 TABLET, DELAYED RELEASE ORAL at 07:50

## 2020-01-01 RX ADMIN — Medication 6.81 MICROGRAM(S)/KG/MIN: at 17:08

## 2020-01-01 RX ADMIN — SIMETHICONE 160 MILLIGRAM(S): 80 TABLET, CHEWABLE ORAL at 05:35

## 2020-01-01 RX ADMIN — PIPERACILLIN AND TAZOBACTAM 25 GRAM(S): 4; .5 INJECTION, POWDER, LYOPHILIZED, FOR SOLUTION INTRAVENOUS at 00:50

## 2020-01-01 RX ADMIN — MIDODRINE HYDROCHLORIDE 20 MILLIGRAM(S): 2.5 TABLET ORAL at 21:10

## 2020-01-01 RX ADMIN — SODIUM CHLORIDE 3 MILLILITER(S): 9 INJECTION INTRAMUSCULAR; INTRAVENOUS; SUBCUTANEOUS at 22:00

## 2020-01-01 RX ADMIN — PANTOPRAZOLE SODIUM 40 MILLIGRAM(S): 20 TABLET, DELAYED RELEASE ORAL at 08:12

## 2020-01-01 RX ADMIN — ENOXAPARIN SODIUM 40 MILLIGRAM(S): 100 INJECTION SUBCUTANEOUS at 11:14

## 2020-01-01 RX ADMIN — Medication 250 MILLIGRAM(S): at 00:37

## 2020-01-01 RX ADMIN — Medication 0.25 MILLIGRAM(S): at 00:34

## 2020-01-01 RX ADMIN — HYDROMORPHONE HYDROCHLORIDE 4 MILLIGRAM(S): 2 INJECTION INTRAMUSCULAR; INTRAVENOUS; SUBCUTANEOUS at 07:30

## 2020-01-01 RX ADMIN — Medication 50 MILLILITER(S): at 11:45

## 2020-01-01 RX ADMIN — MIDODRINE HYDROCHLORIDE 20 MILLIGRAM(S): 2.5 TABLET ORAL at 05:56

## 2020-01-01 RX ADMIN — Medication 263 MILLIGRAM(S): at 22:10

## 2020-01-01 RX ADMIN — PIPERACILLIN AND TAZOBACTAM 25 GRAM(S): 4; .5 INJECTION, POWDER, LYOPHILIZED, FOR SOLUTION INTRAVENOUS at 20:52

## 2020-01-01 RX ADMIN — MIDODRINE HYDROCHLORIDE 20 MILLIGRAM(S): 2.5 TABLET ORAL at 02:46

## 2020-01-01 RX ADMIN — Medication 20 MILLIEQUIVALENT(S): at 13:04

## 2020-01-01 RX ADMIN — MIDODRINE HYDROCHLORIDE 20 MILLIGRAM(S): 2.5 TABLET ORAL at 00:26

## 2020-01-01 RX ADMIN — HYDROMORPHONE HYDROCHLORIDE 1 MILLIGRAM(S): 2 INJECTION INTRAMUSCULAR; INTRAVENOUS; SUBCUTANEOUS at 17:00

## 2020-01-01 RX ADMIN — HYDROMORPHONE HYDROCHLORIDE 1 MILLIGRAM(S): 2 INJECTION INTRAMUSCULAR; INTRAVENOUS; SUBCUTANEOUS at 03:43

## 2020-01-01 RX ADMIN — Medication 216 GRAM(S): at 05:35

## 2020-01-01 RX ADMIN — Medication 40 MILLIGRAM(S): at 05:51

## 2020-01-01 RX ADMIN — MIDODRINE HYDROCHLORIDE 20 MILLIGRAM(S): 2.5 TABLET ORAL at 17:46

## 2020-01-01 RX ADMIN — PANTOPRAZOLE SODIUM 40 MILLIGRAM(S): 20 TABLET, DELAYED RELEASE ORAL at 12:00

## 2020-01-01 RX ADMIN — PANTOPRAZOLE SODIUM 40 MILLIGRAM(S): 20 TABLET, DELAYED RELEASE ORAL at 06:22

## 2020-01-01 RX ADMIN — POTASSIUM PHOSPHATE, MONOBASIC POTASSIUM PHOSPHATE, DIBASIC 83.33 MILLIMOLE(S): 236; 224 INJECTION, SOLUTION INTRAVENOUS at 03:56

## 2020-01-01 RX ADMIN — SODIUM CHLORIDE 3 MILLILITER(S): 9 INJECTION INTRAMUSCULAR; INTRAVENOUS; SUBCUTANEOUS at 14:17

## 2020-01-01 RX ADMIN — HYDROMORPHONE HYDROCHLORIDE 1 MILLIGRAM(S): 2 INJECTION INTRAMUSCULAR; INTRAVENOUS; SUBCUTANEOUS at 17:20

## 2020-01-01 RX ADMIN — SODIUM CHLORIDE 3 MILLILITER(S): 9 INJECTION INTRAMUSCULAR; INTRAVENOUS; SUBCUTANEOUS at 06:23

## 2020-01-01 RX ADMIN — Medication 2 PACKET(S): at 19:48

## 2020-01-01 RX ADMIN — HYDROMORPHONE HYDROCHLORIDE 30 MILLILITER(S): 2 INJECTION INTRAMUSCULAR; INTRAVENOUS; SUBCUTANEOUS at 07:49

## 2020-01-01 RX ADMIN — MIDODRINE HYDROCHLORIDE 20 MILLIGRAM(S): 2.5 TABLET ORAL at 06:06

## 2020-01-01 RX ADMIN — HYDROMORPHONE HYDROCHLORIDE 4 MILLIGRAM(S): 2 INJECTION INTRAMUSCULAR; INTRAVENOUS; SUBCUTANEOUS at 01:24

## 2020-01-01 RX ADMIN — Medication 50 MILLILITER(S): at 06:33

## 2020-01-01 RX ADMIN — Medication 5 MILLIGRAM(S): at 18:36

## 2020-01-01 RX ADMIN — PIPERACILLIN AND TAZOBACTAM 25 GRAM(S): 4; .5 INJECTION, POWDER, LYOPHILIZED, FOR SOLUTION INTRAVENOUS at 22:35

## 2020-01-01 RX ADMIN — Medication 0.25 MILLIGRAM(S): at 21:58

## 2020-01-01 RX ADMIN — ENOXAPARIN SODIUM 40 MILLIGRAM(S): 100 INJECTION SUBCUTANEOUS at 12:01

## 2020-01-01 RX ADMIN — FENTANYL CITRATE 1 PATCH: 50 INJECTION INTRAVENOUS at 06:37

## 2020-01-01 RX ADMIN — ONDANSETRON 4 MILLIGRAM(S): 8 TABLET, FILM COATED ORAL at 05:24

## 2020-01-01 RX ADMIN — ONDANSETRON 4 MILLIGRAM(S): 8 TABLET, FILM COATED ORAL at 08:39

## 2020-01-01 RX ADMIN — Medication 2 MILLIGRAM(S): at 00:50

## 2020-01-01 RX ADMIN — Medication 50 MILLILITER(S): at 09:33

## 2020-01-01 RX ADMIN — PIPERACILLIN AND TAZOBACTAM 25 GRAM(S): 4; .5 INJECTION, POWDER, LYOPHILIZED, FOR SOLUTION INTRAVENOUS at 05:09

## 2020-01-01 RX ADMIN — PIPERACILLIN AND TAZOBACTAM 25 GRAM(S): 4; .5 INJECTION, POWDER, LYOPHILIZED, FOR SOLUTION INTRAVENOUS at 13:05

## 2020-01-01 RX ADMIN — CHLORHEXIDINE GLUCONATE 1 APPLICATION(S): 213 SOLUTION TOPICAL at 06:50

## 2020-01-01 RX ADMIN — MIDODRINE HYDROCHLORIDE 20 MILLIGRAM(S): 2.5 TABLET ORAL at 02:30

## 2020-01-01 RX ADMIN — HYDROMORPHONE HYDROCHLORIDE 30 MILLILITER(S): 2 INJECTION INTRAMUSCULAR; INTRAVENOUS; SUBCUTANEOUS at 12:16

## 2020-01-01 RX ADMIN — Medication 50 MILLILITER(S): at 21:17

## 2020-01-01 RX ADMIN — HYDROMORPHONE HYDROCHLORIDE 4 MILLIGRAM(S): 2 INJECTION INTRAMUSCULAR; INTRAVENOUS; SUBCUTANEOUS at 22:30

## 2020-01-01 RX ADMIN — HYDROMORPHONE HYDROCHLORIDE 1 MILLIGRAM(S): 2 INJECTION INTRAMUSCULAR; INTRAVENOUS; SUBCUTANEOUS at 00:20

## 2020-01-01 RX ADMIN — POTASSIUM PHOSPHATE, MONOBASIC POTASSIUM PHOSPHATE, DIBASIC 83.33 MILLIMOLE(S): 236; 224 INJECTION, SOLUTION INTRAVENOUS at 12:19

## 2020-01-01 RX ADMIN — MIDODRINE HYDROCHLORIDE 20 MILLIGRAM(S): 2.5 TABLET ORAL at 14:20

## 2020-01-01 RX ADMIN — FENTANYL CITRATE 1 PATCH: 50 INJECTION INTRAVENOUS at 19:51

## 2020-01-01 RX ADMIN — Medication 263 MILLIGRAM(S): at 21:06

## 2020-01-01 RX ADMIN — Medication 100 MILLIGRAM(S): at 12:29

## 2020-01-01 RX ADMIN — ENOXAPARIN SODIUM 40 MILLIGRAM(S): 100 INJECTION SUBCUTANEOUS at 12:17

## 2020-01-01 RX ADMIN — Medication 50 MILLILITER(S): at 04:12

## 2020-01-01 RX ADMIN — HYDROMORPHONE HYDROCHLORIDE 4 MILLIGRAM(S): 2 INJECTION INTRAMUSCULAR; INTRAVENOUS; SUBCUTANEOUS at 10:36

## 2020-01-01 RX ADMIN — FENTANYL CITRATE 1 PATCH: 50 INJECTION INTRAVENOUS at 19:00

## 2020-01-01 RX ADMIN — SODIUM CHLORIDE 3 MILLILITER(S): 9 INJECTION INTRAMUSCULAR; INTRAVENOUS; SUBCUTANEOUS at 21:30

## 2020-01-01 RX ADMIN — MIDODRINE HYDROCHLORIDE 20 MILLIGRAM(S): 2.5 TABLET ORAL at 10:43

## 2020-01-01 RX ADMIN — PIPERACILLIN AND TAZOBACTAM 25 GRAM(S): 4; .5 INJECTION, POWDER, LYOPHILIZED, FOR SOLUTION INTRAVENOUS at 05:35

## 2020-01-01 RX ADMIN — Medication 250 MILLIGRAM(S): at 12:56

## 2020-01-01 RX ADMIN — Medication 20 MILLIEQUIVALENT(S): at 17:46

## 2020-05-28 NOTE — H&P ADULT - NSHPLABSRESULTS_GEN_ALL_CORE
.  LABS:                         12.4   3.60  )-----------( 305      ( 28 May 2020 10:04 )             40.9         136  |  98  |  10  ----------------------------<  76  4.0   |  14<L>  |  1.34<H>    Ca    8.5      28 May 2020 10:04    TPro  6.0  /  Alb  2.9<L>  /  TBili  1.1  /  DBili  x   /  AST  238<H>  /  ALT  57<H>  /  AlkPhos  237<H>      PT/INR - ( 28 May 2020 10:04 )   PT: 16.7 sec;   INR: 1.44 ratio         PTT - ( 28 May 2020 10:04 )  PTT:29.5 sec  Urinalysis Basic - ( 28 May 2020 12:53 )    Color: Light Yellow / Appearance: Clear / S.049 / pH: x  Gluc: x / Ketone: Trace  / Bili: Negative / Urobili: Negative   Blood: x / Protein: Trace / Nitrite: Negative   Leuk Esterase: Small / RBC: 1 /hpf / WBC 8 /HPF   Sq Epi: x / Non Sq Epi: 8 /hpf / Bacteria: Negative    < from: CT Abdomen and Pelvis w/ IV Cont (20 @ 11:41) >    No evidence of an infectious source in the chest/abdomen/pelvis..    Small loculated left pleural effusion.    Osseous metastatic disease as at prior PET CT.    Patchy low density throughout the liver suggestive of steatosis. Focal lesions/metastatic disease is not excluded particularly in the left hepatic lobe. Contrast-enhanced MRI is recommended for further evaluation.    A 1 cm nodule in the right retroperitoneum is new and indeterminate    < end of copied text >            RADIOLOGY, EKG & ADDITIONAL TESTS: Reviewed.

## 2020-05-28 NOTE — ED ADULT NURSE NOTE - OBJECTIVE STATEMENT
The pt is a 56 y/o F The pt is a 56 y/o F PMH breast cancer, GERD c/o weakness, fever, tachypneic, tachycardic to 130s at doctor's office today. Pt reports fever x 4 days. Upon assessment, pt is AO x 4, weak appearing, breathing spontaneous and unlabored, abd soft and nontender, equal strength bilaterally, skin warm, dry and intact., new area of redness noted to left upper thigh. Pt has port to right upper chest wall, pt does not know what kind of port. Pt NSR on tele, safety measures in place, comfort measures provided, will continue to monitor. The pt is a 54 y/o F PMH breast cancer, GERD c/o weakness, fever, tachypneic, tachycardic to 130s at doctor's office today. Pt reports fever, chills x 4 days, pt took tylenol prior to arriving to ED. Upon assessment, pt is AO x 4, weak appearing, breathing spontaneous and unlabored, abd soft and nontender, equal strength bilaterally, skin warm, dry and intact., new area of redness noted to left upper thigh. Pt has port to right upper chest wall, pt does not know what kind of port. Pt not accessed in ED, pt gave OK to have peripheral IV. Pt NSR on tele, safety measures in place, comfort measures provided, will continue to monitor. The pt is a 54 y/o F PMH breast cancer, GERD c/o weakness, fever, tachypneic, tachycardic to 130s at doctor's office today. Pt received chemo yesterday. Pt reports fever, chills x 4 days, pt took tylenol prior to arriving to ED. Upon assessment, pt is AO x 4, weak appearing, breathing spontaneous and unlabored, abd soft and nontender, equal strength bilaterally, skin warm, dry and intact., new area of redness noted to left upper thigh. Pt has port to right upper chest wall, pt does not know what kind of port. Pt not accessed in ED, pt gave OK to have peripheral IV. Pt NSR on tele, safety measures in place, comfort measures provided, will continue to monitor.

## 2020-05-28 NOTE — CONSULT NOTE ADULT - ASSESSMENT
5F with PMHx of breast Ca metastatic with mets to liver, lung, bone (follows with [facility]) on chemo last was yesterday presenting with fevers x 1 day and fatigue found to have 11% bands, unclear source admitted for hypotension    #Hypotension  POCUS - EF preserved, A line predominant, IVC collapsed  Please give 1-2L IVF now  Repeat VBG   Unclear primary - UA negative, CT C/A/P no evidence of source  Would obtain XRAY of L thigh, if possible CT   Continue Vanc, Cefepime   F/u blood cx    Will follow case with you. Dispo pending.     Lurdes Freeman, PGY3. 5F with PMHx of breast Ca metastatic with mets to liver, lung, bone (follows with [facility]) on chemo last was yesterday presenting with fevers x 1 day and fatigue found to have 11% bands, unclear source admitted for hypotension    #Hypotension  POCUS - EF preserved, A line predominant, IVC collapsed  Please give 1-2L IVF now  Repeat VBG   Unclear primary - UA negative, CT C/A/P no evidence of source  Would obtain XRAY of L thigh, if possible CT   Continue Vanc, Cefepime   F/u blood cx      #Advanced Care Planing  Patient has no MOLST signed. She states given her comorbidities she would not want to live on life sustaining machines including ventilatory support. She is requesting DNR/DNI, okay for IV pressors.  MOLST yet to be filled out.     Will follow case with you. Dispo pending.     Lurdes Freeman, PGY3. 5F with PMHx of breast Ca metastatic with mets to liver, lung, bone (follows with [facility]) on chemo last was yesterday presenting with fevers x 1 day and fatigue found to have 11% bands, unclear source admitted for hypotension    #Hypotension  POCUS - EF preserved, A line predominant, IVC collapsed  Please give 1-2L IVF now  Repeat VBG - lactate 7   Unclear primary - UA negative, CT C/A/P no evidence of source  XRAY NO EVIDENCE OF GAS  Continue Vanc, Cefepime, and Clindamycin   PLEASE OBTAIN CK, ESR, CRP  PLEASE CONSULT ID, likely bacteremic, patient with b/l hip replacements   PLEASE REPEAT VBG W/ LACTATE NOW   F/u blood cx      #Advanced Care Planing  Patient has no MOLST signed. She states given her comorbidities she would not want to live on life sustaining machines including ventilatory support. She is requesting DNR/DNI, okay for IV pressors.  MOLST yet to be filled out.       Will follow up lactate with you to make sure it is downtrending. Patient is not a MICU candidate at this time.     Lurdes Freeman, PGY3. 5F with PMHx of breast Ca metastatic with mets to liver, lung, bone (follows with [facility]) on chemo last was yesterday presenting with fevers x 1 day and fatigue found to have 11% bands, unclear source admitted for hypotension    #Hypotension  POCUS - EF preserved, A line predominant, IVC collapsed  Please give 1-2L IVF now  Repeat VBG - lactate 7   Unclear primary - UA negative, CT C/A/P no evidence of source  XRAY NO EVIDENCE OF GAS  Continue Vanc, Cefepime, and Clindamycin   PLEASE OBTAIN CK, ESR, CRP  PLEASE CONSULT ID, likely bacteremic, patient with b/l hip replacements   PLEASE REPEAT VBG W/ LACTATE NOW   Please continue midodrine 20 q8  F/u blood cx      #Advanced Care Planing  Patient has no MOLST signed. She states given her comorbidities she would not want to live on life sustaining machines including ventilatory support. She is requesting DNR/DNI, okay for IV pressors.  MOLST yet to be filled out.       Will follow up lactate with you to make sure it is downtrending. Patient is not a MICU candidate at this time.     Lurdes Freeman, PGY3. 5F with PMHx of breast Ca metastatic with mets to liver, lung, bone (follows with [facility]) on chemo last was yesterday presenting with fevers x 1 day and fatigue found to have 11% bands, unclear source admitted for hypotension    #Hypotension  POCUS - EF preserved, A line predominant, IVC collapsed  Please give 1-2L IVF now  Repeat VBG - lactate 7   Unclear primary - UA negative, CT C/A/P no evidence of source  XRAY NO EVIDENCE OF GAS  Continue Vanc, Cefepime, and Clindamycin   PLEASE OBTAIN CK, ESR, CRP  PLEASE CONSULT ID, likely bacteremic, patient with b/l hip replacements   PLEASE REPEAT VBG W/ LACTATE NOW   Please continue midodrine 20 q8  F/u blood cx      #Advanced Care Planing  Patient has no MOLST signed. She states given her comorbidities she would not want to live on life sustaining machines including ventilatory support. She is requesting DNR/DNI, okay for IV pressors.  MOLST yet to be filled out.       Will follow up lactate with you to make sure it is downtrending. PATIENT IS NOW ON IV INOTROPIC SUPPORT PATIENT ACCEPTED TO MICU.     Lurdes Freeman, PGY3.

## 2020-05-28 NOTE — ED ADULT NURSE REASSESSMENT NOTE - NS ED NURSE REASSESS COMMENT FT1
Pt. returned from CT scan. Pt. assisted to position of comfort. Pt. on cardiac monitor. Comfort and safety measures in place.

## 2020-05-28 NOTE — ED ADULT TRIAGE NOTE - CHIEF COMPLAINT QUOTE
COVID19 test done today. PMH metastatic stage 4 breast cancer. Bone/liver mets. Left thigh rash. Tachypneic, tachycardic

## 2020-05-28 NOTE — H&P ADULT - NSHPSOCIALHISTORY_GEN_ALL_CORE
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

## 2020-05-28 NOTE — H&P ADULT - HISTORY OF PRESENT ILLNESS
HPI: 55F with PMHx of GERD, bilateral hip surgeries, breast Ca dx 2005 s/p chemo radiation surgery metastatic with mets to liver, lung, bone (follows with [facility]) seen to be advanced in 2016 on chemo last was yesterday presenting with fevers x 1 day and fatigue. She reports CONTE, unable to walk steps without feeling weak. She has been tolerating PO although with some diarrhea after chemo no blood in stool or vomit . Denies nausea/vomiting, dysuria, cough, sputum production. Previously had pleurex catheter on L side, now removed. Patient also notes painful rash on L thigh, noticed this AM that has progressed, denies trauma or breakage of skin. No animals in the home, no recent travel. She says "all I do is chemotherapy and sleep". No new moisturizers detergents. Never on blood thinner or with blod clot. never with cardiac history.

## 2020-05-28 NOTE — CONSULT NOTE ADULT - SUBJECTIVE AND OBJECTIVE BOX
CHIEF COMPLAINT: weakness    HPI: 55F with PMHx of breast Ca metastatic with mets to liver, lung, bone (follows with [facility]) on chemo last was yesterday presenting with fevers x 1 day and fatigue. She reports CONTE, unable to walk steps without feeling weak. She has been tolerating PO. Denies nausea/vomiting, dysuria, cough, sputum production. Previously had pleurex catheter on L side, now removed. Patient also notes rash on L thigh, noticed this AM that has progressed, denies trauma or breakage of skin. No animals in the home, no recent travel. She says "all I do is chemotherapy and sleep". No new moisturizers detergents.     PAST MEDICAL & SURGICAL HISTORY:  H/O pleural effusion: Left treated 2019  Breast Cancer h/o chemo and radiation in   HPV (Human Papillomavirus) in   GERD (Gastroesophageal Reflux Disease)  H/O: Osteoarthritis  Congenital Hip Dysplasia  Hyperlipidemia - allergic to all statin drugs - on no meds  H/O pleural effusion: S/P Pleurx catheter and medi-port placement 2019  Cryo ablation of cervix in   h/o  Breast Lumpectomyn - left   Breast Cancer - chemoport insertion  and removal  and   H/O: Myomectomy 2003  History of Total Hip Replacement left : right thr       FAMILY HISTORY:  FH: type 2 diabetes      Allergies    Crestor (Joint Pain; Other)  HMG-CoA reductase inhibitors (Unknown)  Lipitor (Rash)  statins (Joint Pain; Rash)  Zocor (Joint Pain; Other)    Intolerances        HOME MEDICATIONS:    REVIEW OF SYSTEMS:  Constitutional: [ x] negative [ x] fevers [ x] chills [ ] weight loss [ ] weight gain  HEENT: [x ] negative [ ] dry eyes [ ] eye irritation [ ] postnasal drip [ ] nasal congestion  CV: [ x] negative  [ ] chest pain [ ] orthopnea [ ] palpitations [ ] murmur  Resp: [] negative [ ] cough [x ] shortness of breath [ ] dyspnea [ ] wheezing [ ] sputum [ ] hemoptysis  GI: [ x] negative [ ] nausea [ ] vomiting [ ] diarrhea [ ] constipation [ ] abd pain [ ] dysphagia   : [x ] negative [ ] dysuria [ ] nocturia [ ] hematuria [ ] increased urinary frequency  Musculoskeletal: [x ] negative [ ] back pain [ ] myalgias [ ] arthralgias [ ] fracture  Skin: [ ] negative [x ] rash [ ] itch  Neurological: [ ] negative [ ] headache [ ] dizziness [ ] syncope [ ] weakness [ ] numbness  Psychiatric: [ ] negative [ ] anxiety [ ] depression  Endocrine: [ ] negative [ ] diabetes [ ] thyroid problem  Hematologic/Lymphatic: [ ] negative [ ] anemia [ ] bleeding problem  Allergic/Immunologic: [ ] negative [ ] itchy eyes [ ] nasal discharge [ ] hives [ ] angioedema  [ ] All other systems negative  [ ] Unable to assess ROS because ________    OBJECTIVE:  ICU Vital Signs Last 24 Hrs  T(C): 36.9 (28 May 2020 11:32), Max: 36.9 (28 May 2020 09:16)  T(F): 98.4 (28 May 2020 11:32), Max: 98.4 (28 May 2020 09:16)  HR: 108 (28 May 2020 12:01) (90 - 127)  BP: 90/47 (28 May 2020 12:01) (56/49 - 164/102)  BP(mean): --  ABP: --  ABP(mean): --  RR: 20 (28 May 2020 12:01) (20 - 30)  SpO2: 99% (28 May 2020 12:01) (97% - 100%)        CAPILLARY BLOOD GLUCOSE    · Constitutional      middle aged female, Lying in bed comfortably, very weak appearing   · HEENT	               PERRL; EOMI; conjunctiva clear  · Neck	               Supple  · Respiratory             good air movement; no rales; no rhonchi; no wheezes  · Cardiovascular       S1 & S2 with RRR; no murmurs, rales or JVD  · Gastrointestinal     soft; no distention; bowel sounds normal; no rigidity  · Extremities             no pedal edema  · Vascular	               Radial Pulse: right normal; left normal  · Neurological           alert and oriented x 3; responds to verbal commands; sensation intact; cranial nerves intact; strength normal  · Skin	               L thigh, TTP, erythema, pustular rash extending to genital region, marked with marker, no crepitus noted, no drainage   · Musculoskeletal    no calf tenderness; diminished strength  · Psychiatric              normal mood and affect    LINES:     HOSPITAL MEDICATIONS:    clindamycin IVPB 600 milliGRAM(s) IV Intermittent every 8 hours    norepinephrine Infusion 0.05 MICROgram(s)/kG/Min IV Continuous <Continuous>                lactated ringers Bolus 1000 milliLiter(s) IV Bolus once            LABS:                        12.4   3.60  )-----------( 305      ( 28 May 2020 10:04 )             40.9     Hgb Trend: 12.4<--      136  |  98  |  10  ----------------------------<  76  4.0   |  14<L>  |  1.34<H>    Ca    8.5      28 May 2020 10:04    TPro  6.0  /  Alb  2.9<L>  /  TBili  1.1  /  DBili  x   /  AST  238<H>  /  ALT  57<H>  /  AlkPhos  237<H>      Creatinine Trend: 1.34<--  PT/INR - ( 28 May 2020 10:04 )   PT: 16.7 sec;   INR: 1.44 ratio         PTT - ( 28 May 2020 10:04 )  PTT:29.5 sec  Urinalysis Basic - ( 28 May 2020 12:53 )    Color: Light Yellow / Appearance: Clear / S.049 / pH: x  Gluc: x / Ketone: Trace  / Bili: Negative / Urobili: Negative   Blood: x / Protein: Trace / Nitrite: Negative   Leuk Esterase: Small / RBC: 1 /hpf / WBC 8 /HPF   Sq Epi: x / Non Sq Epi: 8 /hpf / Bacteria: Negative        Venous Blood Gas:   @ 13:23  7.24/44/23/18/20  VBG Lactate: 7.6  Venous Blood Gas:   @ 10:04  7.30/38/27/18/32  VBG Lactate: 8.3      MICROBIOLOGY:     RADIOLOGY:  [x ] Reviewed and interpreted by me    EKG:

## 2020-05-28 NOTE — H&P ADULT - ASSESSMENT
55F with PMHx of GERD, bilateral hip surgeries, gallstones,  breast Ca dx 2005 s/p chemo radiation surgery metastatic with mets to liver, lung, bone (follows with [facility]) seen to be advanced in 2016 on chemo last was yesterday presenting with fevers x 1 day and fatigue now with rash on left inner thigh concerning for cellulitis started on abx s/p 3L and midodrine 20 now requiring pressors .    #Neuro  alert oriented x3    #Resp  satting well on RA    #CV  no history of ACS, not on aspirin or plavix, no chest pain, no history of heart failure  currently in shock likely distributive in the setting of infection after recent chemo  EKG sinus tach, no chest pain  continue levo titrate to MAP of >65    #GI  history of gerd,, with current heart burn symptoms  will continue protonix  regular diet  transminities could be due to shock or due to tylenol use will continue to monitor    #ID  s/p clindamycin, vanc, cefepime in the ED  concerning for cellulitis vs compartment syndrome although with no air seen on CT scan   continue vanc by level and zosyn for now,  UA negative,   f/u blood cultures  no specimen available for wound culture  COVID PCR neg 5/28    #Renal  no gomez  AYAAN on CKD, likely prerenal in nature  will send off urine studies   continue IV hydration    #Heme  breast Ca dx 2005 s/p chemo radiation surgery metastatic with mets to liver, lung, bone (follows with [facility]) seen to be advanced in 2016 on chemo last was yesterday  continue anti nausea medication   tylenol as needed although catuiously with transminitis at this time      #Endo  f/u A1c in Am , SSI    #DVT PPx lovenox    GOC: patient verbalized that would liekly not like to be CPR or intubated but at this point would defer choice to brother

## 2020-05-28 NOTE — ED PROVIDER NOTE - PROGRESS NOTE DETAILS
Resident: Jone Acosta - pt with rash on LUE that has progressed since arrival. Will add clinda, feel that could possibly be infected hip as well will add on ESR/CRP, pt on 0.08 levo still with MAP of 60, will call MICU for admission. Attending MD Turpin.  PT signed out to me in stable condition pending ICU, TBA, [id number] labs, EKG, 54 yo fem here with CA, chemo yesterday, septic, lactate 8->7, L medial thigh cellulitis, imaging nonactionable, bilateral hip replacement, no nec fac on imaging, MICU reeval. Scar ECHEVARRIA: Patient persistently hypotensive to MAP 40s; started on Leveophed - MICU aware and will come re-eval. for admission. Scar ECHEVARRIA: Patient persistently hypotensive to MAP 40s; started on Leveophed - MICU aware and will come re-eval. for admission..

## 2020-05-28 NOTE — ED PROVIDER NOTE - OBJECTIVE STATEMENT
55 Y F with hx of breast Ca metastatic, HLD on chemo last was yesterday, she states that she has been having fever for the past 24 hours. She presents today with profound fatigue, denies nausea or vomiting, she says that she has still been eating and drinking ok. She says that she took Tylenol prior to arrival. Took one Zofran today. Denies LOC, states that she feels light headed when standing up.

## 2020-05-28 NOTE — ED ADULT NURSE REASSESSMENT NOTE - NS ED NURSE REASSESS COMMENT FT1
Levophed started at 0.1 as per MD Abbott. Will continue to monitor. MD Abbott aware of BP, Levophed started at 0.1 as per MD Abbott. Will continue to monitor.

## 2020-05-28 NOTE — ED ADULT NURSE REASSESSMENT NOTE - NS ED NURSE REASSESS COMMENT FT1
Pt helped onto bedpan, positioned for comfort, given warm blankets and water. Side rails raised, call bell within reach. Pt resting comfortably, NSR on tele, will continue to monitor.

## 2020-05-28 NOTE — H&P ADULT - ATTENDING COMMENTS
55F Hx of GERD, BL Hip ORIF, Gallstones, Metastatic Breast CA to Liver, Lung, Bone, Dx 2005 s/p Surgical Resection, Lt Pleurx Drain, CXT, XRT, Hormonal Therapy in [facility] 2016 last CXT yesterday p/w Lt Inner Thigh Rashes/Cellulitis and Hypotension.  - Sepsis associated hypotension s/p 3 Li IVF Bolus and   - Starting Pressor Support and Midodrine   - Empiric ABx coverage pending panculture    - RA O2 SpO2 100%   - Cellulitis/Rashes Lt inner thigh noted without compartment syndrome   - HAG-MA and Lactic Acidosis, ARF/AYAAN with good urine output   - GOC discussed and she will deferred decision until tomorrow     Patient seen and examined with ICU Resident/Fellow at bedside and lab data, medical records and radiology reports reviewed. I have read and agreeable with resident's Documentation, Assessment and Management Plans above in general which reflected my opinions from discussion.   Total Critical Care Time = 45 Min excluding teaching and procedure activity. 55F Hx of GERD, BL Hip ORIF, Gallstones, Metastatic Breast CA to Liver, Lung, Bone, Dx 2005 s/p Surgical Resection, Lt Pleurx Drain, CXT, XRT, Hormonal Therapy in [facility] 2016 last CXT yesterday p/w Lt Inner Thigh Rashes/Cellulitis and Hypotension.  - Sepsis associated hypotension s/p 3 Li IVF Bolus   - Starting IVF D5LR, Pressor Support and Midodrine   - Empiric ABx coverage pending panculture    - RA O2 SpO2 100%   - Cellulitis/Rashes Lt inner thigh noted without compartment syndrome   - HAG-MA and Lactic Acidosis, ARF/AYAAN with good urine output   - GOC discussed and she will deferred decision until tomorrow     Patient seen and examined with ICU Resident/Fellow at bedside and lab data, medical records and radiology reports reviewed. I have read and agreeable with resident's Documentation, Assessment and Management Plans above in general which reflected my opinions from discussion.   Total Critical Care Time = 45 Min excluding teaching and procedure activity.

## 2020-05-28 NOTE — H&P ADULT - NSHPPHYSICALEXAM_GEN_ALL_CORE
Vital Signs (24 Hrs):  T(C): 39.3 (05-28-20 @ 19:09), Max: 39.3 (05-28-20 @ 19:09)  HR: 106 (05-28-20 @ 19:09) (90 - 128)  BP: 131/60 (05-28-20 @ 19:09) (56/49 - 164/102)  RR: 35 (05-28-20 @ 19:09) (18 - 35)  SpO2: 94% (05-28-20 @ 19:09) (94% - 100%)  Wt(kg): --  Daily Height in cm: 154.94 (28 May 2020 09:16)    Daily     I&O's Summary    PHYSICAL EXAM  GENERAL: NAD, lying comfortably in bed   HEAD:  Atraumatic, Normocephalic  EYES: EOMI b/l, PERRLA b/l, conjunctiva and sclera clear  NECK: Supple, No JVD, No LAD   CHEST/LUNG: Clear to auscultation bilaterally; No wheeze or ronchi  HEART: Regular rate and rhythm; S1 and S2 present, No murmurs, rubs, or gallops  ABDOMEN: Soft, Nontender, Nondistended; Bowel sounds present  EXTREMITIES:  2+ Peripheral Pulses, No clubbing, cyanosis, or edema  NEURO: AAOx3, non-focal   SKIN: No rashes or lesions

## 2020-05-28 NOTE — ED ADULT NURSE REASSESSMENT NOTE - NS ED NURSE REASSESS COMMENT FT1
As  per MICU resident, we are pausing the Levo to watch blood pressure and see how patient does with third liter of fluid and oral midodrine.

## 2020-05-28 NOTE — H&P ADULT - NSHPREVIEWOFSYSTEMS_GEN_ALL_CORE
Review of Systems:  Constitutional: No fever, No weight loss, good appetite/po intake  Head: No headache   Eyes: No blurry vision, No diplopia  Neuro: No tremors, No muscle weakness   Cardiovascular: No chest pain, No palpitations  Respiratory: No SOB, No cough  GI: No nausea, No vomiting, No diarrhea  : No dysuria, No hematuria  Skin: No rash  MSK: No joint pain   Psych: No depression

## 2020-05-28 NOTE — ED PROVIDER NOTE - CLINICAL SUMMARY MEDICAL DECISION MAKING FREE TEXT BOX
55 Y F with 24 hours of chills and fever on chemo for breast CA, no symptoms to indicate where her fever is coming from, nothing on PE. Arrived hypotensive and hypoxic however once in room sat 100% and BP hypertensive, will give fluid bolus, broad spectrum abx and full sepsis NORMAN including COVID swab. Pt will need admission given hypotension at triage. QT prolonged on EKG will avoid additional QT prolonging agents only thing from hx is Zofran. 55 Y F with 24 hours of chills and fever on chemo for breast CA, no symptoms to indicate where her fever is coming from, nothing on PE. Arrived hypotensive and hypoxic however once in room sat 100% and BP hypertensive, will give fluid bolus, broad spectrum abx and full sepsis NORMAN including COVID swab. Pt will need admission given hypotension at triage. QT prolonged on EKG will avoid additional QT prolonging agents only thing from hx is Zofran.  Nancy:56yo with breast Ca, last chemo yesterday. Hypotensive tachicardia. +prolonged QT on EKG (only known drug as cause zofran.) O2 sat 100%. will treat as full on sepsis. was febrile at home and took antipyretic. Will look for neutropenia.

## 2020-05-29 NOTE — CONSULT NOTE ADULT - SUBJECTIVE AND OBJECTIVE BOX
GENERAL SURGERY CONSULT NOTE  --------------------------------------------------------------------------------------------      HPI:  HPI: 55F with PMHx of GERD, bilateral hip surgeries, breast Ca dx  s/p chemo radiation surgery metastatic with mets to liver, lung, bone (follows with [facility]) seen to be advanced in 2016 on chemo last was yesterday presenting with fevers x 1 day and fatigue. She reports CONTE, unable to walk steps without feeling weak. She has been tolerating PO although with some diarrhea after chemo no blood in stool or vomit . Denies nausea/vomiting, dysuria, cough, sputum production. Previously had pleurex catheter on L side, now removed. Patient also notes painful rash on L thigh, noticed this AM that has progressed, denies trauma or breakage of skin. No animals in the home, no recent travel. She says "all I do is chemotherapy and sleep". No new moisturizers detergents. Never on blood thinner or with blod clot. never with cardiac history. (28 May 2020 19:08)      Patient was admitted to MICU with elevated procal, leukopenia, fever, and hypotension requiring davey with concerns of cellulitis and started on board spectrum abx, now off pressors. Surgery consulted for possible necrotizing soft tissue infection. Patient reports left medial thigh erythema and pain with area increasing in size superiorly but improving medially.       PAST MEDICAL & SURGICAL HISTORY:  H/O pleural effusion: Left treated 2019  Breast Cancer h/o chemo and radiation in   HPV (Human Papillomavirus) in   GERD (Gastroesophageal Reflux Disease)  H/O: Osteoarthritis  Congenital Hip Dysplasia  Hyperlipidemia - allergic to all statin drugs - on no meds  H/O pleural effusion: S/P Pleurx catheter and medi-port placement 2019  Cryo ablation of cervix in   h/o  Breast Lumpectomyn - left   Breast Cancer - chemoport insertion  and removal  and   H/O: Myomectomy   History of Total Hip Replacement left : right thr     FAMILY HISTORY:  FH: type 2 diabetes  [] Family history not pertinent as reviewed with the patient and family    SOCIAL HISTORY:  ***    ALLERGIES: Crestor (Joint Pain; Other)  HMG-CoA reductase inhibitors (Unknown)  Lipitor (Rash)  statins (Joint Pain; Rash)  Zocor (Joint Pain; Other)      HOME MEDICATIONS: ***    CURRENT MEDICATIONS  MEDICATIONS (STANDING): dextrose 5% + lactated ringers. 1000 milliLiter(s) IV Continuous <Continuous>  enoxaparin Injectable 40 milliGRAM(s) SubCutaneous daily  midodrine. 20 milliGRAM(s) Oral every 8 hours  norepinephrine Infusion 0.05 MICROgram(s)/kG/Min IV Continuous <Continuous>  pantoprazole    Tablet 40 milliGRAM(s) Oral before breakfast  piperacillin/tazobactam IVPB.. 3.375 Gram(s) IV Intermittent every 8 hours    MEDICATIONS (PRN):ondansetron    Tablet 4 milliGRAM(s) Oral every 8 hours PRN Nausea and/or Vomiting    --------------------------------------------------------------------------------------------    Vitals:   T(C): 37.7 (20 @ 08:00), Max: 39.3 (20 @ 19:09)  HR: 100 (20 @ 08:45) (75 - 128)  BP: 150/71 (20 @ 08:45) (88/66 - 151/72)  RR: 44 (20 @ 08:45) (6 - 54)  SpO2: 96% (20 @ 08:45) (92% - 100%)  CAPILLARY BLOOD GLUCOSE      POCT Blood Glucose.: 136 mg/dL (29 May 2020 09:47)       @ 07:01  -   07:00  --------------------------------------------------------  IN:    dextrose 5% + lactated ringers.: 750 mL    IV PiggyBack: 275 mL    norepinephrine Infusion: 108.4 mL  Total IN: 1133.4 mL    OUT:    Voided: 1300 mL  Total OUT: 1300 mL    Total NET: -166.6 mL       @ 07:01  -   @ 10:44  --------------------------------------------------------  IN:    dextrose 5% + lactated ringers.: 150 mL    IV PiggyBack: 50 mL    norepinephrine Infusion: 16.3 mL    Oral Fluid: 100 mL  Total IN: 316.3 mL    OUT:  Total OUT: 0 mL    Total NET: 316.3 mL        Height (cm): 154.94 ( @ 09:16)  Weight (kg): 72.6 ( @ 09:16)  BMI (kg/m2): 30.2 ( @ 09:16)  BSA (m2): 1.72 ( @ 09:16)      PHYSICAL EXAM: ***  General: NAD, Lying in bed comfortably  Neuro: A+Ox3  HEENT: NC/AT, EOMI  Neck: Soft, supple  Cardio: RRR, off pressors  Resp: Good effort, CTA b/l  GI/Abd: Soft, NT/ND, no rebound/guarding, no masses palpated  Skin: Left medial thigh skin intact, no breakdown, erythema improving since yesterday, tender, blanching, no crepitus  Musculoskeletal: All 4 extremities moving spontaneously, no limitations  --------------------------------------------------------------------------------------------    LABS  CBC ( @ 04:58)                              9.7<L>                         1.98<L>  )----------------(  204        --    % Neutrophils, --    % Lymphocytes, ANC: --                                  32.2<L>  CBC ( 20:11)                              10.6<L>                         2.74<L>  )----------------(  166        --    % Neutrophils, --    % Lymphocytes, ANC: --                                  35.2      BMP ( @ 04:58)             133<L>  |  102     |  7     		Ca++ --      Ca 7.6<L>             ---------------------------------( 383<H>		Mg 5.0<H>             3.8     |  18<L>   |  0.74  			Ph 2.3<L>  BMP ( 20:11)             136     |  105     |  7     		Ca++ --      Ca 6.9<L>             ---------------------------------( 63<L> 		Mg 1.1<L>             4.0     |  15<L>   |  0.57  			Ph 2.9       LFTs ( @ 04:58)      TPro 4.4<L> / Alb 1.9<L> / TBili 1.4<H> / DBili -- / <H> / ALT 38 / AlkPhos 104  LFTs ( 20:11)      TPro 4.7<L> / Alb 2.1<L> / TBili 1.3<H> / DBili -- / <H> / ALT 45 / AlkPhos 136<H>    Coags ( @ 20:11)  aPTT 36.7<H> / INR 1.69<H> / PT 19.7<H>  Coags ( @ 10:04)  aPTT 29.5 / INR 1.44<H> / PT 16.7<H>    Cardiac Markers ( 20:11)     HSTrop: -- / CKMB: -- / CK: 779      VBG ( @ 04:43)     7.40 / 32<L> / 58<H> / 20<L> / -3.7<L> / 88%     Lactate: 5.9<HH>  VBG ( @ 20:09)     7.39 / 32<L> / 34 / 19<L> / -4.9<L> / 59<L>%     Lactate: 5.1<HH>    --------------------------------------------------------------------------------------------    MICROBIOLOGY  Urinalysis ( @ 12:53):     Color: Light Yellow / Appearance: Clear / S.049<H> / pH: 6.0 / Gluc: Negative / Ketones: Trace / Bili: Negative / Urobili: Negative / Protein :Trace<!> / Nitrites: Negative / Leuk.Est: Small<!> / RBC: 1 / WBC: 8<H> / Sq Epi:  / Non Sq Epi: 8<H> / Bacteria Negative       -> .Blood Blood-Peripheral Culture ( @ 11:53)       Growth in aerobic and anaerobic bottles: Gram Positive Cocci in Pairs and  Chains    Blood Culture PCR    Growth in aerobic and anaerobic bottles: Gram Positive Cocci in Pairs and  Chains      --------------------------------------------------------------------------------------------    IMAGING  < from: CT Abdomen and Pelvis w/ IV Cont (20 @ 11:41) >  EXAM:  CT ABDOMEN AND PELVIS IC                          EXAM:  CT CHEST IC                            PROCEDURE DATE:  2020    IMPRESSION:     No evidence of an infectious source in the chest/abdomen/pelvis..    Small loculated left pleural effusion.    Osseous metastatic disease as at prior PET CT.    Patchy low density throughout the liver suggestive of steatosis. Focal lesions/metastatic disease is not excluded particularly in the left hepatic lobe. Contrast-enhanced MRI is recommended for further evaluation.    A 1 cm nodule in the right retroperitoneum is new and indeterminate.      < end of copied text >      -------------------------------------------------------------------------------------------- GENERAL SURGERY CONSULT NOTE  --------------------------------------------------------------------------------------------      HPI:  HPI: 55F with PMHx of GERD, bilateral hip surgeries, breast Ca dx  s/p chemo radiation surgery metastatic with mets to liver, lung, bone (follows with [facility]) seen to be advanced in 2016 on chemo last was yesterday presenting with fevers x 1 day and fatigue. She reports CONTE, unable to walk steps without feeling weak. She has been tolerating PO although with some diarrhea after chemo no blood in stool or vomit . Denies nausea/vomiting, dysuria, cough, sputum production. Previously had pleurex catheter on L side, now removed. Patient also notes painful rash on L thigh, noticed this AM that has progressed, denies trauma or breakage of skin. No animals in the home, no recent travel. She says "all I do is chemotherapy and sleep". No new moisturizers detergents. Never on blood thinner or with blod clot. never with cardiac history. (28 May 2020 19:08)      Patient was admitted to MICU with elevated procal, leukopenia, fever, and hypotension requiring pressors with concerns of cellulitis and started on board spectrum abx, now off pressors. Surgery consulted for possible necrotizing soft tissue infection. Patient reports left medial thigh erythema and pain with area increasing in size superiorly but improving medially.       PAST MEDICAL & SURGICAL HISTORY:  H/O pleural effusion: Left treated 2019  Breast Cancer h/o chemo and radiation in   HPV (Human Papillomavirus) in   GERD (Gastroesophageal Reflux Disease)  H/O: Osteoarthritis  Congenital Hip Dysplasia  Hyperlipidemia - allergic to all statin drugs - on no meds  H/O pleural effusion: S/P Pleurx catheter and medi-port placement 2019  Cryo ablation of cervix in   h/o  Breast Lumpectomyn - left   Breast Cancer - chemoport insertion  and removal  and   H/O: Myomectomy   History of Total Hip Replacement left : right thr     FAMILY HISTORY:  FH: type 2 diabetes  [] Family history not pertinent as reviewed with the patient and family      ALLERGIES: Crestor (Joint Pain; Other)  HMG-CoA reductase inhibitors (Unknown)  Lipitor (Rash)  statins (Joint Pain; Rash)  Zocor (Joint Pain; Other)      CURRENT MEDICATIONS  MEDICATIONS (STANDING): dextrose 5% + lactated ringers. 1000 milliLiter(s) IV Continuous <Continuous>  enoxaparin Injectable 40 milliGRAM(s) SubCutaneous daily  midodrine. 20 milliGRAM(s) Oral every 8 hours  norepinephrine Infusion 0.05 MICROgram(s)/kG/Min IV Continuous <Continuous>  pantoprazole    Tablet 40 milliGRAM(s) Oral before breakfast  piperacillin/tazobactam IVPB.. 3.375 Gram(s) IV Intermittent every 8 hours    MEDICATIONS (PRN):ondansetron    Tablet 4 milliGRAM(s) Oral every 8 hours PRN Nausea and/or Vomiting    --------------------------------------------------------------------------------------------    Vitals:   T(C): 37.7 (20 @ 08:00), Max: 39.3 (20 @ 19:09)  HR: 100 (20 @ 08:45) (75 - 128)  BP: 150/71 (20 @ 08:45) (88/66 - 151/72)  RR: 44 (20 @ 08:45) (6 - 54)  SpO2: 96% (20 @ 08:45) (92% - 100%)  CAPILLARY BLOOD GLUCOSE      POCT Blood Glucose.: 136 mg/dL (29 May 2020 09:47)       @ 07:01  -   07:00  --------------------------------------------------------  IN:    dextrose 5% + lactated ringers.: 750 mL    IV PiggyBack: 275 mL    norepinephrine Infusion: 108.4 mL  Total IN: 1133.4 mL    OUT:    Voided: 1300 mL  Total OUT: 1300 mL    Total NET: -166.6 mL       @ 07:01  -   @ 10:44  --------------------------------------------------------  IN:    dextrose 5% + lactated ringers.: 150 mL    IV PiggyBack: 50 mL    norepinephrine Infusion: 16.3 mL    Oral Fluid: 100 mL  Total IN: 316.3 mL    OUT:  Total OUT: 0 mL    Total NET: 316.3 mL        Height (cm): 154.94 ( @ 09:16)  Weight (kg): 72.6 ( @ 09:16)  BMI (kg/m2): 30.2 ( @ 09:16)  BSA (m2): 1.72 ( @ 09:16)      PHYSICAL EXAM:   General: NAD, Lying in bed comfortably  Neuro: A+Ox3  HEENT: NC/AT, EOMI  Neck: Soft, supple  Cardio: RRR, off pressors  Resp: Good effort, CTA b/l  GI/Abd: Soft, NT/ND, no rebound/guarding, no masses palpated  Skin: Left medial thigh skin intact, no breakdown, erythema improving since yesterday, tender, blanching, no crepitus  Musculoskeletal: All 4 extremities moving spontaneously, no limitations  --------------------------------------------------------------------------------------------    LABS  CBC ( @ 04:58)                              9.7<L>                         1.98<L>  )----------------(  204        --    % Neutrophils, --    % Lymphocytes, ANC: --                                  32.2<L>  CBC ( 20:11)                              10.6<L>                         2.74<L>  )----------------(  166        --    % Neutrophils, --    % Lymphocytes, ANC: --                                  35.2      BMP ( @ 04:58)             133<L>  |  102     |  7     		Ca++ --      Ca 7.6<L>             ---------------------------------( 383<H>		Mg 5.0<H>             3.8     |  18<L>   |  0.74  			Ph 2.3<L>  BMP ( 20:11)             136     |  105     |  7     		Ca++ --      Ca 6.9<L>             ---------------------------------( 63<L> 		Mg 1.1<L>             4.0     |  15<L>   |  0.57  			Ph 2.9       LFTs ( 04:58)      TPro 4.4<L> / Alb 1.9<L> / TBili 1.4<H> / DBili -- / <H> / ALT 38 / AlkPhos 104  LFTs ( 20:11)      TPro 4.7<L> / Alb 2.1<L> / TBili 1.3<H> / DBili -- / <H> / ALT 45 / AlkPhos 136<H>    Coags ( @ 20:11)  aPTT 36.7<H> / INR 1.69<H> / PT 19.7<H>  Coags ( @ 10:04)  aPTT 29.5 / INR 1.44<H> / PT 16.7<H>    Cardiac Markers ( 20:11)     HSTrop: -- / CKMB: -- / CK: 779      VBG ( 04:43)     7.40 / 32<L> / 58<H> / 20<L> / -3.7<L> / 88%     Lactate: 5.9<HH>  VBG ( @ 20:09)     7.39 / 32<L> / 34 / 19<L> / -4.9<L> / 59<L>%     Lactate: 5.1<HH>    --------------------------------------------------------------------------------------------    MICROBIOLOGY  Urinalysis ( @ 12:53):     Color: Light Yellow / Appearance: Clear / S.049<H> / pH: 6.0 / Gluc: Negative / Ketones: Trace / Bili: Negative / Urobili: Negative / Protein :Trace<!> / Nitrites: Negative / Leuk.Est: Small<!> / RBC: 1 / WBC: 8<H> / Sq Epi:  / Non Sq Epi: 8<H> / Bacteria Negative       -> .Blood Blood-Peripheral Culture ( @ 11:53)       Growth in aerobic and anaerobic bottles: Gram Positive Cocci in Pairs and  Chains    Blood Culture PCR    Growth in aerobic and anaerobic bottles: Gram Positive Cocci in Pairs and  Chains      --------------------------------------------------------------------------------------------    IMAGING  < from: CT Abdomen and Pelvis w/ IV Cont (20 @ 11:41) >  EXAM:  CT ABDOMEN AND PELVIS IC                          EXAM:  CT CHEST IC                            PROCEDURE DATE:  2020    IMPRESSION:     No evidence of an infectious source in the chest/abdomen/pelvis..    Small loculated left pleural effusion.    Osseous metastatic disease as at prior PET CT.    Patchy low density throughout the liver suggestive of steatosis. Focal lesions/metastatic disease is not excluded particularly in the left hepatic lobe. Contrast-enhanced MRI is recommended for further evaluation.    A 1 cm nodule in the right retroperitoneum is new and indeterminate.      < end of copied text >      --------------------------------------------------------------------------------------------

## 2020-05-29 NOTE — CONSULT NOTE ADULT - ATTENDING COMMENTS
Patient seen and examined with house staff @ approximately 1030am  Awake, alert, oriented x 4  Non-toxic appearing  Hemodynamically stable without need for IV vasopressors  afebrile  left upper outer thigh / groin with erythema, mildly tender, no breaks in skin, receding from marked lines    WBC=1.98 (last chemo for metastatic breast cancer yesterday)  creatinine = 0.74, No=641, procal=75    CT imaging reviewed (from 5/28/20) - no obvious collection / air in left groin    - Although last night / early this AM with need for levophed infusion is concerning for severe soft tissue infection, patient seems to be non-toxic and rapidly improving.  Would not suggest OR at this time.  - Will follow with you
I have examined pt and agree with above exam and plan above.    56 y/o female with metastatic breast cancer with mets to bone now in septic shock from cellulitis L thigh. Ct L leg without signs of necrotizing fascitis.  Pt has remained hypotensive despite fluids. Pt unable to wean off pressors. Midodrine did not work. Pt in septic shock with bandemia.  Obtain blood cx. Agree with vancomycin and clindamycin and cefepime. Would consult ID. pt with h/o bilateral hip replacements.    Admit to MICU.

## 2020-05-29 NOTE — PROGRESS NOTE ADULT - ASSESSMENT
55F with PMHx of GERD, bilateral hip surgeries, gallstones,  breast Ca dx 2005 s/p chemo radiation surgery metastatic with mets to liver, lung, bone (follows with [facility]) seen to be advanced in 2016 on chemo last was yesterday presenting with fevers x 1 day and fatigue now with rash on left inner thigh concerning for cellulitis started on abx s/p 3L and midodrine 20 briefly requiring pressors .    #Neuro  alert oriented x3    #Resp  satting well on RA    #CV  no history of ACS, not on aspirin or plavix, no chest pain, no history of heart failure  currently in shock likely distributive in the setting of infection after recent chemo  EKG sinus tach, no chest pain  continue levo titrate to MAP of >65    #GI  history of gerd,, with current heart burn symptoms  will continue protonix  regular diet  transminities could be due to shock or due to tylenol use will continue to monitor    #ID  s/p clindamycin, vanc, cefepime in the ED  concerning for cellulitis vs compartment syndrome although with no air seen on CT scan   continue vanc by level and zosyn for now,  UA negative,   f/u blood cultures  no specimen available for wound culture  COVID PCR neg 5/28    #Renal  no gomez  AYAAN on CKD, likely prerenal in nature  will send off urine studies   continue IV hydration    #Heme  breast Ca dx 2005 s/p chemo radiation surgery metastatic with mets to liver, lung, bone (follows with [facility]) seen to be advanced in 2016 on chemo last was yesterday  continue anti nausea medication   tylenol as needed although catuiously with transminitis at this time      #Endo  f/u A1c in [place] , SSI    #DVT PPx lovenox    GOC: patient verbalized that would liekly not like to be CPR or intubated but at this point would defer choice to brother 55F with PMHx of GERD, bilateral hip surgeries, gallstones,  breast Ca dx 2005 s/p chemo radiation surgery metastatic with mets to liver, lung, bone (follows with [facility]) seen to be advanced in 2016 on chemo last was yesterday presenting with fevers x 1 day and fatigue now with rash on left inner thigh concerning for cellulitis started on abx s/p 3L and midodrine 20 briefly requiring pressors for hypovolemic and distributive septic shock  from rash inducing group A strep bactermia now off IV pressors on midodrine and zosyn and stable for the floors.    #Neuro  alert oriented x3    #Resp  satting well on RA, no dicomfort    #CV  no history of ACS, not on aspirin or plavix, no chest pain, no history of heart failure  currently in shock likely distributive in the setting of infection after recent chemo  EKG sinus tach, no chest pain  now off levo titrate to MAP of >65  continue midodrine 20mg q8hrs to wean off pressors  continue to trend lactate    #GI  history of gerd,, with current heart burn symptoms  will continue protonix  regular diet  transminities improved    #ID  s/p clindamycin, vanc, cefepime in the ED  concerning for cellulitis vs compartment syndrome although with no air seen on CT scan   continue vanc by level and zosyn for now,  UA negative,   5/28 blood clx growing strep A pyogenes   ID consulted: will continue zosyn for now as improvement, f/u sensitivities  surgery consulted in the off chance patient develops nec fasc vs compartment syndrome although low suspicion given clinical presentation and imaging  no specimen available for wound culture  COVID PCR neg 5/28    #Renal  no gomez  AYAAN on CKD, likely prerenal in nature  resolved with good UOP  continue IV hydration 50cc /hr    #Heme  breast Ca dx 2005 s/p chemo radiation surgery metastatic with mets to liver, lung, bone (follows with [facility]) seen to be advanced in 2016 on chemo last was yesterday  continue anti nausea medication   tylenol as needed although catuiously with transminitis at this time  [facility] consulted should follow up reccs    #Endo  f/u A1c in [place] , SSI    #DVT PPx lovenox    GOC: patient verbalized that would liekly not like to be CPR or intubated but at this point would defer choice to brother

## 2020-05-29 NOTE — DIETITIAN INITIAL EVALUATION ADULT. - OTHER INFO
Pt seen for: MICU Length Of Stay   Adm dx: cellulitis. Has mets CA on chemo.   GI issues: denies N/V none since adm     Food allergies/Intolerances: raw apples, pears, peaches, able to eat them cooked    Vit/supplement PTA: none    Diet PTA: regular, no restrictions     Subjective/Objective information: pt reports good appetite PTA, chemo hasn't had significant impact on po intake. reports usual wt 155-160 lb, no wt changes over past year.    Education: Not indicated at this time

## 2020-05-29 NOTE — CHART NOTE - NSCHARTNOTEFT_GEN_A_CORE
: Coral Paredes    INDICATION:  Sepsis    PROCEDURE:  [x ] LIMITED ECHO  [x ] LIMITED CHEST  [ ] LIMITED RETROPERITONEAL  [x ] LIMITED ABDOMINAL  [ ] LIMITED DVT  [ ] NEEDLE GUIDANCE VASCULAR  [ ] NEEDLE GUIDANCE THORACENTESIS  [ ] NEEDLE GUIDANCE PARACENTESIS  [ ] NEEDLE GUIDANCE PERICARDIOCENTESIS  [ ] OTHER    FINDINGS:  Heart: RV less than LV in size. Normal LV function. No evidence of pericardial effusion   Lung: A lines with rare B lines. No consolidation or pleural effusion seen  Abd: Multiple lesions seen on liver   IVC 1.63cm     INTERPRETATION:  Normal cardiac function   Normal lung aeration   Multiple metastasis seen on the liver     Images uploaded on [facility]    Performed with Dr. Hampton

## 2020-05-29 NOTE — CONSULT NOTE ADULT - ASSESSMENT
55F with PMHx of GERD, bilateral hip surgeries, breast Ca dx 2005 s/p chemo radiation surgery metastatic with mets to liver, lung, bone on chemo presenting with left thigh soft tissue infection. Patient non-septic, afebrile. vitals stable without pressors. No clinical signs of necrotizing soft tissue infection. Likely cellulitis, now improving on antibiotics.       Recommedations:   - No acute surgical intervention at this time  - Continue IV abx  - Monitor for increase area of erythema  - Surgery will follow  - Patient seen/examined with Attending Dr. Kamara.    Trauma Surgery  [id number]

## 2020-05-29 NOTE — CONSULT NOTE ADULT - SUBJECTIVE AND OBJECTIVE BOX
ID CONSULTATION--Trey Cantrell MD  Pager [phone]    Patient is a 55y old  Female who presents with a chief complaint of weakness fevers, (29 May 2020 10:42)    HPI:  HPI: 55F with PMHx of GERD, bilateral hip surgeries, breast Ca dx 2005 s/p chemo radiation surgery metastatic with mets to liver, lung, boneseen to be advanced in 2016 on chemo last was yesterday presenting with fevers x 1 day and fatigue. She reports CONTE, unable to walk steps without feeling weak. She has been tolerating PO although with some diarrhea after chemo no blood in stool or vomit . Denies nausea/vomiting, dysuria, cough, sputum production. Previously had pleurex catheter on L side, now removed. Patient also noted painful rash on L thigh, noticed this AM that has progressed, denies trauma or breakage of skin. No animals in the home, no recent travel. She says "all I do is chemotherapy and sleep". No new moisturizers detergents. Never on blood thinner or with blod clot. never with cardiac history. (28 May 2020 19:08)    BC with GA Strep.  Initially on vanco, clinda and zosyn.  L thigh feels a bit better - pain at worst was 8/10.  Now feels less pain.  Less weak than before.    PAST MEDICAL & SURGICAL HISTORY:  H/O pleural effusion: Left treated 9/2019  Breast Cancer h/o chemo and radiation in 2005  HPV (Human Papillomavirus) in 2004  GERD (Gastroesophageal Reflux Disease)  H/O: Osteoarthritis  Congenital Hip Dysplasia  Hyperlipidemia - allergic to all statin drugs - on no meds  H/O pleural effusion: S/P Pleurx catheter and medi-port placement 9/6/2019  Cryo ablation of cervix in 2006  h/o  Breast Lumpectomyn - left 2005  Breast Cancer - chemoport insertion  and removal 2005 and 2006  H/O: Myomectomy 2003  History of Total Hip Replacement left 2003: right thr 2011    SOCIAL: lives w brother    FAMILY HISTORY:  FH: type 2 diabetes    REVIEW OF SYSTEMS    Ophthalmologic:Denies any visual complaints,discharge redness or photophobia	  ENMT:No nasal discharge,headache,sinus congestion or throat pain.No dental complaints  Respiratory and Thorax:No cough,sputum or chest pain.Denies shortness of breath  Cardiovascular:	No chest pain,palpitaions or dizziness  Gastrointestinal:	NO nausea,abdominal pain or diarrhea.  Genitourinary:	No dysuria,frequency. No flank pain  Musculoskeletal:	 good ROM at joints  Neurological:No confusion,diziness.No extremity weakness.No bladder or bowel incontinence	    Allergies    Crestor (Joint Pain; Other)  HMG-CoA reductase inhibitors (Unknown)  Lipitor (Rash)  statins (Joint Pain; Rash)  Zocor (Joint Pain; Other)    ANTIMICROBIALS:  piperacillin/tazobactam IVPB.. 3.375 Gram(s) IV Intermittent every 8 hours    Vital Signs Last 24 Hrs  T(C): 37.7 (29 May 2020 08:00), Max: 39.3 (28 May 2020 19:09)  T(F): 99.8 (29 May 2020 08:00), Max: 102.7 (28 May 2020 19:09)  HR: 100 (29 May 2020 08:45) (75 - 128)  BP: 150/71 (29 May 2020 08:45) (88/66 - 151/72)  BP(mean): 102 (29 May 2020 08:45) (49 - 104)  RR: 44 (29 May 2020 08:45) (6 - 54)  SpO2: 96% (29 May 2020 08:45) (92% - 100%)    PHYSICAL EXAM:Pleasant patient in no acute distress.  conversant.  Constitutional:Comfortable.Awake and alert  No cachexia   Eyes:PERRL EOMI.NO discharge or conjunctival injection  ENMT:No sinus tenderness.No thrush.No pharyngeal exudate or erythema.Fair dental hygiene  Neck:Supple,No LN,no JVD  Respiratory:Good air entry bilaterally,CTA  Cardiovascular:S1 S2 wnl, No murmurs,rub or gallops  Gastrointestinal:Soft BS(+) no tenderness no masses ,No rebound or guarding  Genitourinary:No CVA tendereness   Extremities: L thigh, fading less confluent redness.  No crepitus and no real induration.  Good and painless induration at Thigh/hip  Neurological:AAO X 3,No grossly focal deficits  Lymph Nodes:No palpable LNs  Psychiatric:Affect normal.               9.7    1.98  )-----------( 204      ( 29 May 2020 04:58 )             32.2   05-29    133<L>  |  102  |  7   ----------------------------<  383<H>  3.8   |  18<L>  |  0.74    Ca    7.6<L>      29 May 2020 04:58  Phos  2.3     05-29  Mg     5.0     05-29    TPro  4.4<L>  /  Alb  1.9<L>  /  TBili  1.4<H>  /  DBili  x   /  AST  177<H>  /  ALT  38  /  AlkPhos  104  05-29    RECENT CULTURES:  05-28 @ 11:53  .Blood Blood-Peripheral  Blood Culture PCR  Blood Culture PCR  PCR    Growth in aerobic and anaerobic bottles: Gram Positive Cocci in Pairs and  Chains--- Group a strep  --  RADIOLOGY:    < from: CT Abdomen and Pelvis w/ IV Cont (05.28.20 @ 11:41) >    No evidence of an infectious source in the chest/abdomen/pelvis..    Small loculated left pleural effusion.    Osseous metastatic disease as at prior PET CT.    Patchy low density throughout the liver suggestive of steatosis. Focal lesions/metastatic disease is not excluded particularly in the left hepatic lobe. Contrast-enhanced MRI is recommended for further evaluation.    A 1 cm nodule in the right retroperitoneum is new and indeterminate.    < end of copied text >    < from: Xray Femur 2 Views, Left (05.28.20 @ 15:16) >    Left hip arthroplasty.    No acute displaced fracture or dislocation. No periprosthetic lucencies. No subcutaneous tracking gas collection.      < end of copied text >    IMPRESSION:  Septic shock secondary to Group A Strep bacteremia and L thigh infection.  Recent chemoRx via a R mediport and developing neutropenia.  L thigh no crepitus/induration and overall less painful than yesterday.  Group A strep is a pathogen sensitive to Penicillin.  For now we can use zosyn--to cover the Group A strep and also because she may be becoming neutropenic after recent chemoRx.  If ANC rises we can de-escalate to PCN or ampicillin.  Doubt we need the clinda/vanco.    I've asked surgery to see the patient and follow.  If worsens would need surgical exploration, but probably not indicated at this moment.    Would not remove the mediport--GA strep not a common pathogen to infect lines/ports.  Doubt infective endocarditis and would not pursue TTE.    ID covers this weekend  [id number].    d/w micu team.    Rx:

## 2020-05-29 NOTE — PROGRESS NOTE ADULT - SUBJECTIVE AND OBJECTIVE BOX
INTERVAL HPI/OVERNIGHT EVENTS:    SUBJECTIVE: Patient seen and examined at bedside.     CONSTITUTIONAL: No weakness, fevers or chills  EYES/ENT: No visual changes;  No vertigo or throat pain   NECK: No pain or stiffness  RESPIRATORY: No cough, wheezing, hemoptysis; No shortness of breath  CARDIOVASCULAR: No chest pain or palpitations  GASTROINTESTINAL: No abdominal or epigastric pain. No nausea, vomiting, or hematemesis; No diarrhea or constipation. No melena or hematochezia.  GENITOURINARY: No dysuria, frequency or hematuria  NEUROLOGICAL: No numbness or weakness  SKIN: No itching, rashes    OBJECTIVE:    VITAL SIGNS:  ICU Vital Signs Last 24 Hrs  T(C): 38.3 (29 May 2020 16:00), Max: 39.3 (28 May 2020 19:09)  T(F): 101 (29 May 2020 16:00), Max: 102.7 (28 May 2020 19:09)  HR: 88 (29 May 2020 18:00) (75 - 112)  BP: 137/81 (29 May 2020 18:00) (98/48 - 153/68)  BP(mean): 103 (29 May 2020 18:00) (69 - 104)  ABP: --  ABP(mean): --  RR: 32 (29 May 2020 18:00) (6 - 54)  SpO2: 95% (29 May 2020 18:00) (78% - 99%)         @ 07:  -   @ 07:00  --------------------------------------------------------  IN: 1133.4 mL / OUT: 1300 mL / NET: -166.6 mL     @ 07:  -   @ 18:07  --------------------------------------------------------  IN: 2059.5 mL / OUT: 825 mL / NET: 1234.5 mL      CAPILLARY BLOOD GLUCOSE      POCT Blood Glucose.: 136 mg/dL (29 May 2020 09:47)      PHYSICAL EXAM:    General: NAD  HEENT: NC/AT; PERRL, clear conjunctiva  Neck: supple  Respiratory: CTA b/l  Cardiovascular: +S1/S2; RRR  Abdomen: soft, NT/ND; +BS x4  Extremities: WWP, 2+ peripheral pulses b/l; no LE edema  Skin: normal color and turgor; no rash  Neurological:    MEDICATIONS:  MEDICATIONS  (STANDING):  chlorhexidine 4% Liquid 1 Application(s) Topical <User Schedule>  dextrose 5% + lactated ringers. 1000 milliLiter(s) (50 mL/Hr) IV Continuous <Continuous>  enoxaparin Injectable 40 milliGRAM(s) SubCutaneous daily  midodrine. 20 milliGRAM(s) Oral every 8 hours  norepinephrine Infusion 0.05 MICROgram(s)/kG/Min (6.81 mL/Hr) IV Continuous <Continuous>  pantoprazole    Tablet 40 milliGRAM(s) Oral before breakfast  piperacillin/tazobactam IVPB.. 3.375 Gram(s) IV Intermittent every 8 hours    MEDICATIONS  (PRN):  ondansetron    Tablet 4 milliGRAM(s) Oral every 8 hours PRN Nausea and/or Vomiting      ALLERGIES:  Allergies    Crestor (Joint Pain; Other)  HMG-CoA reductase inhibitors (Unknown)  Lipitor (Rash)  statins (Joint Pain; Rash)  Zocor (Joint Pain; Other)    Intolerances        LABS:                        9.7    1.98  )-----------( 204      ( 29 May 2020 04:58 )             32.2     05-29    133<L>  |  102  |  7   ----------------------------<  383<H>  3.8   |  18<L>  |  0.74    Ca    7.6<L>      29 May 2020 04:58  Phos  2.3     -  Mg     5.0         TPro  4.4<L>  /  Alb  1.9<L>  /  TBili  1.4<H>  /  DBili  x   /  AST  177<H>  /  ALT  38  /  AlkPhos  104  -    PT/INR - ( 28 May 2020 20:11 )   PT: 19.7 sec;   INR: 1.69 ratio         PTT - ( 28 May 2020 20:11 )  PTT:36.7 sec  Urinalysis Basic - ( 28 May 2020 12:53 )    Color: Light Yellow / Appearance: Clear / S.049 / pH: x  Gluc: x / Ketone: Trace  / Bili: Negative / Urobili: Negative   Blood: x / Protein: Trace / Nitrite: Negative   Leuk Esterase: Small / RBC: 1 /hpf / WBC 8 /HPF   Sq Epi: x / Non Sq Epi: 8 /hpf / Bacteria: Negative        RADIOLOGY & ADDITIONAL TESTS: Reviewed.    Authored by Dr. Luigi GIBSON [id number], [phone] INTERVAL HPI/OVERNIGHT EVENTS: blood cultures grew strep A pyogenes, febrile to 102.1, lovenox increased to 40    SUBJECTIVE: Patient seen and examined at bedside. patient feeling better this morning, able to come off pressors, rash does hurt more althoguh still without pruritis or pain out of proportion    CONSTITUTIONAL: fatigue  EYES/ENT: No visual changes;  No vertigo or throat pain   NECK: No pain or stiffness  RESPIRATORY: No cough, wheezing, hemoptysis;  shortness of breath improved  CARDIOVASCULAR: No chest pain or palpitations  GASTROINTESTINAL: No abdominal or epigastric pain. No nausea, vomiting, or hematemesis; No diarrhea or constipation. No melena or hematochezia.  GENITOURINARY: No dysuria, frequency or hematuria  NEUROLOGICAL: No numbness or weakness  SKIN: rash present    OBJECTIVE:    VITAL SIGNS:  ICU Vital Signs Last 24 Hrs  T(C): 38.3 (29 May 2020 16:00), Max: 39.3 (28 May 2020 19:09)  T(F): 101 (29 May 2020 16:00), Max: 102.7 (28 May 2020 19:09)  HR: 88 (29 May 2020 18:00) (75 - 112)  BP: 137/81 (29 May 2020 18:00) (98/48 - 153/68)  BP(mean): 103 (29 May 2020 18:00) (69 - 104)  ABP: --  ABP(mean): --  RR: 32 (29 May 2020 18:00) (6 - 54)  SpO2: 95% (29 May 2020 18:00) (78% - 99%)         @ :  -   @ 07:00  --------------------------------------------------------  IN: 1133.4 mL / OUT: 1300 mL / NET: -166.6 mL     @ :  -   @ 18:07  --------------------------------------------------------  IN: 2059.5 mL / OUT: 825 mL / NET: 1234.5 mL      CAPILLARY BLOOD GLUCOSE      POCT Blood Glucose.: 136 mg/dL (29 May 2020 09:47)      PHYSICAL EXAM:    General: NAD  HEENT: NC/AT; PERRL, clear conjunctiva  Neck: supple  Respiratory: CTA b/l  Cardiovascular: +S1/S2; RRR  Abdomen: soft, NT/ND; +BS x4  Extremities: WWP, 2+ peripheral pulses b/l; no LE edema  Skin: normal color and turgor; rash present in upper left thigh along the medial aspect, spreading   Neurological: alert oriented    MEDICATIONS:  MEDICATIONS  (STANDING):  chlorhexidine 4% Liquid 1 Application(s) Topical <User Schedule>  dextrose 5% + lactated ringers. 1000 milliLiter(s) (50 mL/Hr) IV Continuous <Continuous>  enoxaparin Injectable 40 milliGRAM(s) SubCutaneous daily  midodrine. 20 milliGRAM(s) Oral every 8 hours  norepinephrine Infusion 0.05 MICROgram(s)/kG/Min (6.81 mL/Hr) IV Continuous <Continuous>  pantoprazole    Tablet 40 milliGRAM(s) Oral before breakfast  piperacillin/tazobactam IVPB.. 3.375 Gram(s) IV Intermittent every 8 hours    MEDICATIONS  (PRN):  ondansetron    Tablet 4 milliGRAM(s) Oral every 8 hours PRN Nausea and/or Vomiting      ALLERGIES:  Allergies    Crestor (Joint Pain; Other)  HMG-CoA reductase inhibitors (Unknown)  Lipitor (Rash)  statins (Joint Pain; Rash)  Zocor (Joint Pain; Other)    Intolerances        LABS:                        9.7    1.98  )-----------( 204      ( 29 May 2020 04:58 )             32.2         133<L>  |  102  |  7   ----------------------------<  383<H>  3.8   |  18<L>  |  0.74    Ca    7.6<L>      29 May 2020 04:58  Phos  2.3       Mg     5.0         TPro  4.4<L>  /  Alb  1.9<L>  /  TBili  1.4<H>  /  DBili  x   /  AST  177<H>  /  ALT  38  /  AlkPhos  104      PT/INR - ( 28 May 2020 20:11 )   PT: 19.7 sec;   INR: 1.69 ratio         PTT - ( 28 May 2020 20:11 )  PTT:36.7 sec  Urinalysis Basic - ( 28 May 2020 12:53 )    Color: Light Yellow / Appearance: Clear / S.049 / pH: x  Gluc: x / Ketone: Trace  / Bili: Negative / Urobili: Negative   Blood: x / Protein: Trace / Nitrite: Negative   Leuk Esterase: Small / RBC: 1 /hpf / WBC 8 /HPF   Sq Epi: x / Non Sq Epi: 8 /hpf / Bacteria: Negative        RADIOLOGY & ADDITIONAL TESTS: Reviewed.    Authored by Dr. Luigi GIBSON [id number], [phone]

## 2020-05-29 NOTE — DIETITIAN INITIAL EVALUATION ADULT. - PERTINENT MEDS FT
MEDICATIONS  (STANDING):  chlorhexidine 4% Liquid 1 Application(s) Topical <User Schedule>  dextrose 5% + lactated ringers. 1000 milliLiter(s) (75 mL/Hr) IV Continuous <Continuous>  enoxaparin Injectable 40 milliGRAM(s) SubCutaneous daily  midodrine. 20 milliGRAM(s) Oral every 8 hours  norepinephrine Infusion 0.05 MICROgram(s)/kG/Min (6.81 mL/Hr) IV Continuous <Continuous>  pantoprazole    Tablet 40 milliGRAM(s) Oral before breakfast  piperacillin/tazobactam IVPB.. 3.375 Gram(s) IV Intermittent every 8 hours  potassium phosphate IVPB 15 milliMole(s) IV Intermittent once    MEDICATIONS  (PRN):  ondansetron    Tablet 4 milliGRAM(s) Oral every 8 hours PRN Nausea and/or Vomiting

## 2020-05-29 NOTE — DIETITIAN INITIAL EVALUATION ADULT. - ENERGY NEEDS
Ht: 61"  Wt: 160   BMI: 30.2 kg/m2   IBW: 105 (+/-10%)     152% IBW  Edema: none        Skin: no pressure injuries documented

## 2020-05-30 NOTE — CONSULT NOTE ADULT - ASSESSMENT
55 F with PMHx of GERD, bilateral hip surgeries, gallstones,  breast Ca dx 2005 s/p chemo radiation surgery metastatic with mets to liver, lung, bone (follows with [facility]) seen to be advanced in 2016 on chemo last was yesterday presenting with fevers x 1 day and fatigue now with rash on left inner thigh concerning for cellulitis started on abx s/p 3L and midodrine 20 briefly requiring pressors for hypovolemic and distributive septic shock  from rash inducing group A strep bacteremia now off IV pressors since 05/29th on midodrine and zosyn and stable for the floors.    # Septic shock due to Group A strep Pyogenes bacteremia  # Stage IV breast caner currently on chemo  # Chemo induced Neutropenia   # AYAAN on CKD, likely prerenal in nature  # Transminities improved  # Constipation    Plan:  s/p clindamycin, vanc, cefepime in the ED  concerning for cellulitis vs compartment syndrome although with no air seen on CT scan   5/28 blood clx growing strep A pyogenes   House ID eval appreciated. continue Zosyn IV. de-escalate abx once she continue to improves.  weaned off levo, continue midodrine 20mg q8hrs, wean as tolearted  Pending VA dupelx and CT LE with IV contrast to r/o myonecrosis.  Pending TTE, though low suspicion for endocarditis per ID. No need for mediport removal.   UA negative. COVID PCR neg 5/28  Surgery on the case in the off chance patient develops nec fasc vs compartment syndrome although low suspicion   AYAAN resolved, Cr back to normal s/p IV hydration   Breast Ca dx 2005 s/p chemo radiation surgery metastatic with mets to liver, lung, bone (follows with [facility]) seen to be advanced in 2016 on chemo last was yesterday  Discussed with [facility]. no chemo while pt is inpatient.   continue anti nausea medication   Physical therapy. Out of bed to chair with assistance.   DVT ppx    Pt of Dr. Jayden Landrum ([facility])    - Dr. RUSSELL Bush ([facility])  - [phone]

## 2020-05-30 NOTE — PROGRESS NOTE ADULT - ASSESSMENT
55F with PMHx of GERD, bilateral hip surgeries, gallstones,  breast Ca dx 2005 s/p chemo radiation surgery metastatic with mets to liver, lung, bone (follows with [facility]) seen to be advanced in 2016 on chemo last was yesterday presenting with fevers x 1 day and fatigue now with rash on left inner thigh concerning for cellulitis started on abx s/p 3L and midodrine 20 briefly requiring pressors for hypovolemic and distributive septic shock  from rash inducing group A strep bactermia now off IV pressors on midodrine and zosyn and stable for the floors.    #Neuro  alert oriented x3    #Resp  Reports SOB, will provide bronchodilators and re-ugo    #CV  no history of ACS, not on aspirin or plavix, no chest pain, no history of heart failure  currently in shock likely distributive in the setting of infection after recent chemo  EKG sinus tach, no chest pain  now off levo titrate to MAP of >65  continue midodrine 20mg q8hrs to wean off pressors  continue to trend lactate  Follow [facility]    #GI  history of gerd,, with current heart burn symptoms  will continue protonix  regular diet  transminities improved  Constipation; will start bowel regimen    #ID  s/p clindamycin, vanc, cefepime in the ED  concerning for cellulitis vs compartment syndrome although with no air seen on CT scan   continue vanc by level and zosyn for now,  UA negative,   5/28 blood clx growing strep A pyogenes   ID consulted: will continue zosyn for now as improvement, f/u sensitivities  surgery consulted in the off chance patient develops nec fasc vs compartment syndrome although low suspicion given clinical presentation and imaging  no specimen available for wound culture  COVID PCR neg 5/28  As per surgery, no intervention required at this moment  MRSA PCR swab sent     #Renal  no gomez  AYAAN on CKD, likely prerenal in nature  resolved with good UOP  IV fluids dc'd    #Heme  breast Ca dx 2005 s/p chemo radiation surgery metastatic with mets to liver, lung, bone (follows with [facility]) seen to be advanced in 2016 on chemo last was yesterday  continue anti nausea medication   tylenol as needed although catuiously with transminitis at this time  [facility] consulted should follow up reccs    #Endo  f/u A1c in Am , SSI    #DVT PPx lovenox    GOC: patient verbalized that would liekly not like to be CPR or intubated but at this point would defer choice to brother

## 2020-05-30 NOTE — PROGRESS NOTE ADULT - ASSESSMENT
55F with PMHx of GERD, bilateral hip surgeries, breast Ca dx 2005 s/p chemo radiation surgery metastatic with mets to liver, lung, bone on chemo presenting with left thigh soft tissue infection. Patient non-septic, afebrile. vitals stable without pressors. No clinical signs of necrotizing soft tissue infection. Likely cellulitis, now improving on antibiotics.       Recommendations   - No acute surgical intervention at this time  - Continue IV abx  - Monitor for increase area of erythema  - Surgery will follow    Trauma Surgery  [id number]

## 2020-05-30 NOTE — PROGRESS NOTE ADULT - SUBJECTIVE AND OBJECTIVE BOX
INFECTIOUS DISEASES FOLLOW UP-- Francisca Leone  [phone]    This is a follow up note for this  55yFemale with  Cellulitis  c/o pain left groin area      ROS:  CONSTITUTIONAL:   fever, good appetite  CARDIOVASCULAR:  No chest pain or palpitations  RESPIRATORY:  No dyspnea, but dry cough  GASTROINTESTINAL:  No nausea, vomiting, diarrhea, or abdominal pain  pain in left thigh and groin area  GENITOURINARY:  No dysuria  NEUROLOGIC:  No headache,     Allergies    Crestor (Joint Pain; Other)  HMG-CoA reductase inhibitors (Unknown)  Lipitor (Rash)  statins (Joint Pain; Rash)  Zocor (Joint Pain; Other)    Intolerances        ANTIBIOTICS/RELEVANT:  antimicrobials  piperacillin/tazobactam IVPB.. 3.375 Gram(s) IV Intermittent every 8 hours    immunologic:    OTHER:  benzonatate 100 milliGRAM(s) Oral three times a day PRN  chlorhexidine 4% Liquid 1 Application(s) Topical <User Schedule>  enoxaparin Injectable 40 milliGRAM(s) SubCutaneous daily  ipratropium    for Nebulization 500 MICROGram(s) Nebulizer every 6 hours PRN  midodrine. 20 milliGRAM(s) Oral every 8 hours  ondansetron    Tablet 4 milliGRAM(s) Oral every 8 hours PRN  pantoprazole    Tablet 40 milliGRAM(s) Oral before breakfast  polyethylene glycol 3350 17 Gram(s) Oral two times a day  senna Syrup 5 milliLiter(s) Oral daily      Objective:  Vital Signs Last 24 Hrs  T(C): 37.2 (30 May 2020 12:00), Max: 37.6 (29 May 2020 20:00)  T(F): 98.9 (30 May 2020 12:00), Max: 99.7 (30 May 2020 04:00)  HR: 87 (30 May 2020 18:00) (77 - 110)  BP: 123/58 (30 May 2020 18:00) (106/51 - 139/69)  BP(mean): 82 (30 May 2020 18:00) (73 - 98)  RR: 44 (30 May 2020 18:00) (18 - 59)  SpO2: 95% (30 May 2020 18:00) (86% - 98%)    PHYSICAL EXAM:  Constitutional:no acute distress  Eyes:DAWIT, EOMI  Ear/Nose/Throat: no oral lesions, no thrush	  Respiratory: clear BL port site no erythema  Cardiovascular: S1S2  Gastrointestinal:soft, (+) BS, no tenderness  Extremities: left thigh and groin area to mons with erythema and tenderness  No Lymphadenopathy  IV sites not inflammed.    LABS:                        8.9    1.42  )-----------( 138      ( 30 May 2020 01:09 )             28.7     05-30    132<L>  |  101  |  8   ----------------------------<  120<H>  3.5   |  23  |  0.65    Ca    7.5<L>      30 May 2020 01:09  Phos  1.2     05-30  Mg     2.1     05-30    TPro  4.4<L>  /  Alb  1.8<L>  /  TBili  1.8<H>  /  DBili  x   /  AST  154<H>  /  ALT  38  /  AlkPhos  121<H>  05-30    PT/INR - ( 30 May 2020 01:09 )   PT: 20.6 sec;   INR: 1.78 ratio         PTT - ( 30 May 2020 01:09 )  PTT:52.6 sec      MICROBIOLOGY:            RECENT CULTURES:  05-28 @ 11:53  .Blood Blood-Peripheral  Blood Culture PCR  Streptococcus pyogenes (Group A)  Blood Culture PCR  PCR    Growth in aerobic and anaerobic bottles: Streptococcus pyogenes (Group A)  --      RADIOLOGY & ADDITIONAL STUDIES:    < from: Xray Femur 2 Views, Left (05.28.20 @ 15:16) >  FINDINGS/  IMPRESSION:     Left hip arthroplasty.    No acute displaced fracture or dislocation. No periprosthetic lucencies. No subcutaneous tracking gas collection.    < end of copied text >  < from: CT Abdomen and Pelvis w/ IV Cont (05.28.20 @ 11:41) >    IMPRESSION:     No evidence of an infectious source in the chest/abdomen/pelvis..    Small loculated left pleural effusion.    Osseous metastatic disease as at prior PET CT.    Patchy low density throughout the liver suggestive of steatosis. Focal lesions/metastatic disease is not excluded particularly in the left hepatic lobe. Contrast-enhanced MRI is recommended for further evaluation.    A 1 cm nodule in the right retroperitoneum is new and indeterminate.    < end of copied text >

## 2020-05-30 NOTE — PROGRESS NOTE ADULT - SUBJECTIVE AND OBJECTIVE BOX
Surgery Progress Note    S: Patient seen and examined. No acute events overnight. Reports tolerating diet without nausea, vomiting, passing flatus, having bowel movements. Pain improving, reports pain in the medial leg.     O:  PHYSICAL EXAM:   General: NAD, Lying in bed comfortably  Neuro: A+Ox3  HEENT: NC/AT, EOMI  Neck: Soft, supple  Cardio: RRR, off pressors  Resp: Good effort, CTA b/l  GI/Abd: Soft, NT/ND, no rebound/guarding, no masses palpated  Skin: Left medial thigh skin intact, no breakdown, erythema improving since yesterday, tender, blanching, no crepitus  LE: No pain with passive foot extension or calf tenderness      Vital Signs Last 24 Hrs  T(C): 37.6 (30 May 2020 04:00), Max: 38.3 (29 May 2020 16:00)  T(F): 99.7 (30 May 2020 04:00), Max: 101 (29 May 2020 16:00)  HR: 105 (30 May 2020 07:00) (77 - 110)  BP: 117/66 (30 May 2020 07:00) (98/57 - 144/65)  BP(mean): 85 (30 May 2020 07:00) (71 - 103)  RR: 41 (30 May 2020 07:00) (18 - 59)  SpO2: 95% (30 May 2020 07:00) (78% - 98%)    I&O's Detail    29 May 2020 07:01  -  30 May 2020 07:00  --------------------------------------------------------  IN:    dextrose 5% + lactated ringers.: 1150 mL    IV PiggyBack: 700 mL    norepinephrine Infusion: 9.5 mL    Oral Fluid: 975 mL    Solution: 500 mL    Solution: 249 mL  Total IN: 3583.5 mL    OUT:    Voided: 2175 mL  Total OUT: 2175 mL    Total NET: 1408.5 mL                                8.9    1.42  )-----------( 138      ( 30 May 2020 01:09 )             28.7       05-30    132<L>  |  101  |  8   ----------------------------<  120<H>  3.5   |  23  |  0.65    Ca    7.5<L>      30 May 2020 01:09  Phos  1.2     05-30  Mg     2.1     05-30    TPro  4.4<L>  /  Alb  1.8<L>  /  TBili  1.8<H>  /  DBili  x   /  AST  154<H>  /  ALT  38  /  AlkPhos  121<H>  05-30

## 2020-05-30 NOTE — PROGRESS NOTE ADULT - ASSESSMENT
IMPRESSION:  Septic shock secondary to Group A Strep bacteremia and L thigh infection.  Recent chemoRx via a R mediport and developing neutropenia.    Has significant erythema and tenderness in the left thigh and mons area but no further spreading based upon prior demarcation    May be more difficult to asses erythema in a patient with neutropenia  Given her significant tenderness in the area and group A strep positive blood cultures would favor a specific imaging study of the left thigh and groin/mons pubis area as this infection can spread rapidly into necrotizing fasciitis  Surgery following as well  Repeat blood cultures    Discussed with [facility]U resident    Ambrose Leone MD  [phone]  After 5pm/weekends [phone]

## 2020-05-30 NOTE — CHART NOTE - NSCHARTNOTEFT_GEN_A_CORE
MICU Transfer Note  ---------------------------    Transfer from: MICU  Transfer to:  (x  ) Medicine    (  ) Telemetry    (  ) RCU    (  ) Palliative    (  ) Stroke Unit    (  ) _______________  Accepting Physician:___________________      MICU COURSE  55F with PMHx of GERD, bilateral hip surgeries, gallstones,  breast Ca dx 2005 s/p chemo radiation surgery metastatic with mets to liver, lung, bone (follows with [facility]) seen to be advanced in 2016 on chemo last was yesterday presenting with fevers x 1 day and fatigue now with rash on left inner thigh concerning for cellulitis started on abx s/p 3L and midodrine 20 briefly requiring pressors for hypovolemic and distributive septic shock  from rash inducing group A strep bactermia now off IV pressors since 05/29th on midodrine and zosyn and stable for the floors.      OBJECTIVE --  Vital Signs Last 24 Hrs  T(C): 37.2 (30 May 2020 12:00), Max: 38.3 (29 May 2020 16:00)  T(F): 98.9 (30 May 2020 12:00), Max: 101 (29 May 2020 16:00)  HR: 83 (30 May 2020 13:00) (77 - 110)  BP: 123/59 (30 May 2020 13:00) (98/57 - 139/69)  BP(mean): 85 (30 May 2020 13:00) (71 - 103)  RR: 26 (30 May 2020 13:00) (18 - 59)  SpO2: 95% (30 May 2020 13:00) (86% - 98%)    I&O's Summary    29 May 2020 07:01  -  30 May 2020 07:00  --------------------------------------------------------  IN: 3583.5 mL / OUT: 2175 mL / NET: 1408.5 mL    30 May 2020 07:01  -  30 May 2020 14:26  --------------------------------------------------------  IN: 416 mL / OUT: 300 mL / NET: 116 mL        MEDICATIONS  (STANDING):  chlorhexidine 4% Liquid 1 Application(s) Topical <User Schedule>  dextrose 5% + lactated ringers. 1000 milliLiter(s) (50 mL/Hr) IV Continuous <Continuous>  enoxaparin Injectable 40 milliGRAM(s) SubCutaneous daily  midodrine. 20 milliGRAM(s) Oral every 8 hours  pantoprazole    Tablet 40 milliGRAM(s) Oral before breakfast  piperacillin/tazobactam IVPB.. 3.375 Gram(s) IV Intermittent every 8 hours  polyethylene glycol 3350 17 Gram(s) Oral two times a day  senna Syrup 5 milliLiter(s) Oral daily    MEDICATIONS  (PRN):  benzonatate 100 milliGRAM(s) Oral three times a day PRN Cough  ipratropium    for Nebulization 500 MICROGram(s) Nebulizer every 6 hours PRN Shortness of Breath and/or Wheezing  ondansetron    Tablet 4 milliGRAM(s) Oral every 8 hours PRN Nausea and/or Vomiting        LABS                                            8.9                   Neurophils% (auto):   x      (05-30 @ 01:09):    1.42 )-----------(138          Lymphocytes% (auto):  x                                             28.7                   Eosinphils% (auto):   x        Manual%: Neutrophils x    ; Lymphocytes x    ; Eosinophils x    ; Bands%: x    ; Blasts x                                    132    |  101    |  8                   Calcium: 7.5   / iCa: x      (05-30 @ 01:09)    ----------------------------<  120       Magnesium: 2.1                              3.5     |  23     |  0.65             Phosphorous: 1.2      TPro  4.4    /  Alb  1.8    /  TBili  1.8    /  DBili  x      /  AST  154    /  ALT  38     /  AlkPhos  121    30 May 2020 01:09    ( 05-30 @ 01:09 )   PT: 20.6 sec;   INR: 1.78 ratio  aPTT: 52.6 sec          ASSESSMENT & PLAN:   55F with PMHx of GERD, bilateral hip surgeries, gallstones,  breast Ca dx 2005 s/p chemo radiation surgery metastatic with mets to liver, lung, bone (follows with [facility]) seen to be advanced in 2016 on chemo last was yesterday presenting with fevers x 1 day and fatigue now with rash on left inner thigh concerning for cellulitis started on abx s/p 3L and midodrine 20 briefly requiring pressors for hypovolemic and distributive septic shock  from rash inducing group A strep bactermia now off IV pressors since 05/29th on midodrine and zosyn and stable for the floors.    #Neuro  alert oriented x3    #Resp  satting well on RA, no dicomfort    #CV  no history of ACS, not on aspirin or plavix, no chest pain, no history of heart failure  currently in shock likely distributive in the setting of infection after recent chemo  EKG sinus tach, no chest pain  now off levo titrate to MAP of >65  continue midodrine 20mg q8hrs to wean off pressors  continue to trend lactate    #GI  history of gerd,, with current heart burn symptoms  will continue protonix  regular diet  transminities improved  Constipation; started bowel regimen     #ID  s/p clindamycin, vanc, cefepime in the ED  concerning for cellulitis vs compartment syndrome although with no air seen on CT scan   continue Zosyn  UA negative,   5/28 blood clx growing strep A pyogenes   ID consulted: will continue zosyn for now as improvement, f/u sensitivities  surgery consulted in the off chance patient develops nec fasc vs compartment syndrome although low suspicion given clinical presentation and imaging  no specimen available for wound culture  COVID PCR neg 5/28    #Renal  no gomez  AYAAN on CKD, likely prerenal in nature  resolved with good UOP  continue IV hydration 50cc /hr    #Heme  breast Ca dx 2005 s/p chemo radiation surgery metastatic with mets to liver, lung, bone (follows with [facility]) seen to be advanced in 2016 on chemo last was yesterday  continue anti nausea medication   tylenol as needed although catuiously with transminitis at this time  [facility] consulted should follow up reccs    #Endo  f/u A1c in [place] , SSI    #DVT PPx lovenox    GOC: patient verbalized that would liekly not like to be CPR or intubated but at this point would defer choice to brother      For Follow-Up:  [ ] LLLE duplex  [ ] Lactate  [ ] Follow MRSA PCR  [ ] Follow FIONA  [ ] Continue antibiotics   [ ] Follow [facility] MICU Transfer Note  ---------------------------    Transfer from: MICU  Transfer to:  (x  ) Medicine    (  ) Telemetry    (  ) RCU    (  ) Palliative    (  ) Stroke Unit    (  ) _______________  Accepting Physician: Syd Prescott      MICU COURSE  55F with PMHx of GERD, bilateral hip surgeries, gallstones,  breast Ca dx 2005 s/p chemo radiation surgery metastatic with mets to liver, lung, bone (follows with [facility]) seen to be advanced in 2016 on chemo last was yesterday presenting with fevers x 1 day and fatigue now with rash on left inner thigh concerning for cellulitis started on abx s/p 3L and midodrine 20 briefly requiring pressors for hypovolemic and distributive septic shock  from rash inducing group A strep bactermia now off IV pressors since 05/29th on midodrine and zosyn and stable for the floors.      OBJECTIVE --  Vital Signs Last 24 Hrs  T(C): 37.2 (30 May 2020 12:00), Max: 38.3 (29 May 2020 16:00)  T(F): 98.9 (30 May 2020 12:00), Max: 101 (29 May 2020 16:00)  HR: 83 (30 May 2020 13:00) (77 - 110)  BP: 123/59 (30 May 2020 13:00) (98/57 - 139/69)  BP(mean): 85 (30 May 2020 13:00) (71 - 103)  RR: 26 (30 May 2020 13:00) (18 - 59)  SpO2: 95% (30 May 2020 13:00) (86% - 98%)    I&O's Summary    29 May 2020 07:01  -  30 May 2020 07:00  --------------------------------------------------------  IN: 3583.5 mL / OUT: 2175 mL / NET: 1408.5 mL    30 May 2020 07:01  -  30 May 2020 14:26  --------------------------------------------------------  IN: 416 mL / OUT: 300 mL / NET: 116 mL        MEDICATIONS  (STANDING):  chlorhexidine 4% Liquid 1 Application(s) Topical <User Schedule>  dextrose 5% + lactated ringers. 1000 milliLiter(s) (50 mL/Hr) IV Continuous <Continuous>  enoxaparin Injectable 40 milliGRAM(s) SubCutaneous daily  midodrine. 20 milliGRAM(s) Oral every 8 hours  pantoprazole    Tablet 40 milliGRAM(s) Oral before breakfast  piperacillin/tazobactam IVPB.. 3.375 Gram(s) IV Intermittent every 8 hours  polyethylene glycol 3350 17 Gram(s) Oral two times a day  senna Syrup 5 milliLiter(s) Oral daily    MEDICATIONS  (PRN):  benzonatate 100 milliGRAM(s) Oral three times a day PRN Cough  ipratropium    for Nebulization 500 MICROGram(s) Nebulizer every 6 hours PRN Shortness of Breath and/or Wheezing  ondansetron    Tablet 4 milliGRAM(s) Oral every 8 hours PRN Nausea and/or Vomiting        LABS                                            8.9                   Neurophils% (auto):   x      (05-30 @ 01:09):    1.42 )-----------(138          Lymphocytes% (auto):  x                                             28.7                   Eosinphils% (auto):   x        Manual%: Neutrophils x    ; Lymphocytes x    ; Eosinophils x    ; Bands%: x    ; Blasts x                                    132    |  101    |  8                   Calcium: 7.5   / iCa: x      (05-30 @ 01:09)    ----------------------------<  120       Magnesium: 2.1                              3.5     |  23     |  0.65             Phosphorous: 1.2      TPro  4.4    /  Alb  1.8    /  TBili  1.8    /  DBili  x      /  AST  154    /  ALT  38     /  AlkPhos  121    30 May 2020 01:09    ( 05-30 @ 01:09 )   PT: 20.6 sec;   INR: 1.78 ratio  aPTT: 52.6 sec          ASSESSMENT & PLAN:   55F with PMHx of GERD, bilateral hip surgeries, gallstones,  breast Ca dx 2005 s/p chemo radiation surgery metastatic with mets to liver, lung, bone (follows with [facility]) seen to be advanced in 2016 on chemo last was yesterday presenting with fevers x 1 day and fatigue now with rash on left inner thigh concerning for cellulitis started on abx s/p 3L and midodrine 20 briefly requiring pressors for hypovolemic and distributive septic shock  from rash inducing group A strep bactermia now off IV pressors since 05/29th on midodrine and zosyn and stable for the floors.    #Neuro  alert oriented x3    #Resp  satting well on RA, no dicomfort    #CV  no history of ACS, not on aspirin or plavix, no chest pain, no history of heart failure  currently in shock likely distributive in the setting of infection after recent chemo  EKG sinus tach, no chest pain  now off levo titrate to MAP of >65  continue midodrine 20mg q8hrs to wean off pressors  continue to trend lactate    #GI  history of gerd,, with current heart burn symptoms  will continue protonix  regular diet  transminities improved  Constipation; started bowel regimen     #ID  s/p clindamycin, vanc, cefepime in the ED  concerning for cellulitis vs compartment syndrome although with no air seen on CT scan   continue Zosyn  UA negative,   5/28 blood clx growing strep A pyogenes   ID consulted: will continue zosyn for now as improvement, f/u sensitivities  surgery consulted in the off chance patient develops nec fasc vs compartment syndrome although low suspicion given clinical presentation and imaging  no specimen available for wound culture  COVID PCR neg 5/28    #Renal  no gomez  AYAAN on CKD, likely prerenal in nature  resolved with good UOP  continue IV hydration 50cc /hr    #Heme  breast Ca dx 2005 s/p chemo radiation surgery metastatic with mets to liver, lung, bone (follows with [facility]) seen to be advanced in 2016 on chemo last was yesterday  continue anti nausea medication   tylenol as needed although catuiously with transminitis at this time  [facility] consulted should follow up reccs    #Endo  f/u A1c in [place] , SSI    #DVT PPx lovenox    GOC: patient verbalized that would liekly not like to be CPR or intubated but at this point would defer choice to brother      For Follow-Up:  [ ] LLLE duplex  [ ] Lactate  [ ] Follow MRSA PCR  [ ] Follow FIONA  [ ] Continue antibiotics   [ ] Follow [facility] MICU Transfer Note  ---------------------------    Transfer from: MICU  Transfer to:  (x  ) Medicine    (  ) Telemetry    (  ) RCU    (  ) Palliative    (  ) Stroke Unit    (  ) _______________  Accepting Physician: Syd Prescott      MICU COURSE  55F with PMHx of GERD, bilateral hip surgeries, gallstones,  breast Ca dx 2005 s/p chemo radiation surgery metastatic with mets to liver, lung, bone (follows with [employer]) seen to be advanced in 2016 on chemo last was yesterday presenting with fevers x 1 day and fatigue now with rash on left inner thigh concerning for cellulitis started on abx s/p 3L and midodrine 20 briefly requiring pressors for hypovolemic and distributive septic shock  from rash inducing group A strep bacteremia now off IV pressors since 05/29th on midodrine and zosyn and stable for the floors.      OBJECTIVE --  Vital Signs Last 24 Hrs  T(C): 37.2 (30 May 2020 12:00), Max: 38.3 (29 May 2020 16:00)  T(F): 98.9 (30 May 2020 12:00), Max: 101 (29 May 2020 16:00)  HR: 83 (30 May 2020 13:00) (77 - 110)  BP: 123/59 (30 May 2020 13:00) (98/57 - 139/69)  BP(mean): 85 (30 May 2020 13:00) (71 - 103)  RR: 26 (30 May 2020 13:00) (18 - 59)  SpO2: 95% (30 May 2020 13:00) (86% - 98%)    I&O's Summary    29 May 2020 07:01  -  30 May 2020 07:00  --------------------------------------------------------  IN: 3583.5 mL / OUT: 2175 mL / NET: 1408.5 mL    30 May 2020 07:01  -  30 May 2020 14:26  --------------------------------------------------------  IN: 416 mL / OUT: 300 mL / NET: 116 mL        MEDICATIONS  (STANDING):  chlorhexidine 4% Liquid 1 Application(s) Topical <User Schedule>  dextrose 5% + lactated ringers. 1000 milliLiter(s) (50 mL/Hr) IV Continuous <Continuous>  enoxaparin Injectable 40 milliGRAM(s) SubCutaneous daily  midodrine. 20 milliGRAM(s) Oral every 8 hours  pantoprazole    Tablet 40 milliGRAM(s) Oral before breakfast  piperacillin/tazobactam IVPB.. 3.375 Gram(s) IV Intermittent every 8 hours  polyethylene glycol 3350 17 Gram(s) Oral two times a day  senna Syrup 5 milliLiter(s) Oral daily    MEDICATIONS  (PRN):  benzonatate 100 milliGRAM(s) Oral three times a day PRN Cough  ipratropium    for Nebulization 500 MICROGram(s) Nebulizer every 6 hours PRN Shortness of Breath and/or Wheezing  ondansetron    Tablet 4 milliGRAM(s) Oral every 8 hours PRN Nausea and/or Vomiting        LABS                                            8.9                   Neurophils% (auto):   x      (05-30 @ 01:09):    1.42 )-----------(138          Lymphocytes% (auto):  x                                             28.7                   Eosinphils% (auto):   x        Manual%: Neutrophils x    ; Lymphocytes x    ; Eosinophils x    ; Bands%: x    ; Blasts x                                    132    |  101    |  8                   Calcium: 7.5   / iCa: x      (05-30 @ 01:09)    ----------------------------<  120       Magnesium: 2.1                              3.5     |  23     |  0.65             Phosphorous: 1.2      TPro  4.4    /  Alb  1.8    /  TBili  1.8    /  DBili  x      /  AST  154    /  ALT  38     /  AlkPhos  121    30 May 2020 01:09    ( 05-30 @ 01:09 )   PT: 20.6 sec;   INR: 1.78 ratio  aPTT: 52.6 sec          ASSESSMENT & PLAN:   55F with PMHx of GERD, bilateral hip surgeries, gallstones,  breast Ca dx 2005 s/p chemo radiation surgery metastatic with mets to liver, lung, bone (follows with [facility]) seen to be advanced in 2016 on chemo last was yesterday presenting with fevers x 1 day and fatigue now with rash on left inner thigh concerning for cellulitis started on abx s/p 3L and midodrine 20 briefly requiring pressors for hypovolemic and distributive septic shock  from rash inducing group A strep bacteremia now off IV pressors since 05/29th on midodrine and zosyn and stable for the floors.    #Neuro  alert oriented x3    #Resp  satting well on RA, no dicomfort    #CV  no history of ACS, not on aspirin or plavix, no chest pain, no history of heart failure  currently in shock likely distributive in the setting of infection after recent chemo  EKG sinus tach, no chest pain  now off levo titrate to MAP of >65  continue midodrine 20mg q8hrs to wean off pressors  continue to trend lactate    #GI  history of gerd,, with current heart burn symptoms  will continue protonix  regular diet  transminities improved  Constipation; started bowel regimen     #ID  s/p clindamycin, vanc, cefepime in the ED  concerning for cellulitis vs compartment syndrome although with no air seen on CT scan   continue Zosyn  UA negative,   5/28 blood clx growing strep A pyogenes   ID consulted: will continue zosyn for now as improvement, f/u sensitivities  surgery consulted in the off chance patient develops nec fasc vs compartment syndrome although low suspicion given clinical presentation and imaging  no specimen available for wound culture  COVID PCR neg 5/28    #Renal  no gomez  AYAAN on CKD, likely prerenal in nature  resolved with good UOP  continue IV hydration 50cc /hr    #Heme  breast Ca dx 2005 s/p chemo radiation surgery metastatic with mets to liver, lung, bone (follows with [facility]) seen to be advanced in 2016 on chemo last was yesterday  continue anti nausea medication   tylenol as needed although catuiously with transminitis at this time  [facility] consulted should follow up reccs    #Endo  f/u A1c in [place] , SSI    #DVT PPx lovenox    GOC: patient verbalized that would liekly not like to be CPR or intubated but at this point would defer choice to brother      For Follow-Up:  [ ] LLLE duplex  [ ] Lactate  [ ] Follow MRSA PCR  [ ] Follow FIONA  [ ] Continue antibiotics   [ ] Follow [facility] MICU Transfer Note  ---------------------------    Transfer from: MICU  Transfer to:  (x  ) Medicine    (  ) Telemetry    (  ) RCU    (  ) Palliative    (  ) Stroke Unit    (  ) _______________  Accepting Physician: Dr. Bush ([facility]). [phone] day. [phone] night      MICU COURSE  55F with PMHx of GERD, bilateral hip surgeries, gallstones,  breast Ca dx 2005 s/p chemo radiation surgery metastatic with mets to liver, lung, bone (follows with [facility]) seen to be advanced in 2016 on chemo last was yesterday presenting with fevers x 1 day and fatigue now with rash on left inner thigh concerning for cellulitis started on abx s/p 3L and midodrine 20 briefly requiring pressors for hypovolemic and distributive septic shock  from rash inducing group A strep bacteremia now off IV pressors since 05/29th on midodrine and zosyn and stable for the floors.      OBJECTIVE --  Vital Signs Last 24 Hrs  T(C): 37.2 (30 May 2020 12:00), Max: 38.3 (29 May 2020 16:00)  T(F): 98.9 (30 May 2020 12:00), Max: 101 (29 May 2020 16:00)  HR: 83 (30 May 2020 13:00) (77 - 110)  BP: 123/59 (30 May 2020 13:00) (98/57 - 139/69)  BP(mean): 85 (30 May 2020 13:00) (71 - 103)  RR: 26 (30 May 2020 13:00) (18 - 59)  SpO2: 95% (30 May 2020 13:00) (86% - 98%)    I&O's Summary    29 May 2020 07:01  -  30 May 2020 07:00  --------------------------------------------------------  IN: 3583.5 mL / OUT: 2175 mL / NET: 1408.5 mL    30 May 2020 07:01  -  30 May 2020 14:26  --------------------------------------------------------  IN: 416 mL / OUT: 300 mL / NET: 116 mL        MEDICATIONS  (STANDING):  chlorhexidine 4% Liquid 1 Application(s) Topical <User Schedule>  dextrose 5% + lactated ringers. 1000 milliLiter(s) (50 mL/Hr) IV Continuous <Continuous>  enoxaparin Injectable 40 milliGRAM(s) SubCutaneous daily  midodrine. 20 milliGRAM(s) Oral every 8 hours  pantoprazole    Tablet 40 milliGRAM(s) Oral before breakfast  piperacillin/tazobactam IVPB.. 3.375 Gram(s) IV Intermittent every 8 hours  polyethylene glycol 3350 17 Gram(s) Oral two times a day  senna Syrup 5 milliLiter(s) Oral daily    MEDICATIONS  (PRN):  benzonatate 100 milliGRAM(s) Oral three times a day PRN Cough  ipratropium    for Nebulization 500 MICROGram(s) Nebulizer every 6 hours PRN Shortness of Breath and/or Wheezing  ondansetron    Tablet 4 milliGRAM(s) Oral every 8 hours PRN Nausea and/or Vomiting        LABS                                            8.9                   Neurophils% (auto):   x      (05-30 @ 01:09):    1.42 )-----------(138          Lymphocytes% (auto):  x                                             28.7                   Eosinphils% (auto):   x        Manual%: Neutrophils x    ; Lymphocytes x    ; Eosinophils x    ; Bands%: x    ; Blasts x                                    132    |  101    |  8                   Calcium: 7.5   / iCa: x      (05-30 @ 01:09)    ----------------------------<  120       Magnesium: 2.1                              3.5     |  23     |  0.65             Phosphorous: 1.2      TPro  4.4    /  Alb  1.8    /  TBili  1.8    /  DBili  x      /  AST  154    /  ALT  38     /  AlkPhos  121    30 May 2020 01:09    ( 05-30 @ 01:09 )   PT: 20.6 sec;   INR: 1.78 ratio  aPTT: 52.6 sec          ASSESSMENT & PLAN:   55F with PMHx of GERD, bilateral hip surgeries, gallstones,  breast Ca dx 2005 s/p chemo radiation surgery metastatic with mets to liver, lung, bone [facility] seen to be advanced in 2016 on chemo last was yesterday presenting with fevers x 1 day and fatigue now with rash on left inner thigh concerning for cellulitis started on abx s/p 3L and midodrine 20 briefly requiring pressors for hypovolemic and distributive septic shock  from rash inducing group A strep bacteremia now off IV pressors since 05/29th on midodrine and zosyn and stable for the floors.    #Neuro  alert oriented x3    #Resp  satting well on RA, no dicomfort    #CV  no history of ACS, not on aspirin or plavix, no chest pain, no history of heart failure  currently in shock likely distributive in the setting of infection after recent chemo  EKG sinus tach, no chest pain  now off levo titrate to MAP of >65  continue midodrine 20mg q8hrs to wean off pressors  continue to trend lactate    #GI  history of gerd,, with current heart burn symptoms  will continue protonix  regular diet  transminities improved  Constipation; started bowel regimen     #ID  s/p clindamycin, vanc, cefepime in the ED  concerning for cellulitis vs compartment syndrome although with no air seen on CT scan   continue Zosyn  UA negative,   5/28 blood clx growing strep A pyogenes   ID consulted: will continue zosyn for now as improvement, f/u sensitivities  surgery consulted in the off chance patient develops nec fasc vs compartment syndrome although low suspicion given clinical presentation and imaging  no specimen available for wound culture  COVID PCR neg 5/28    #Renal  no gomez  AYAAN on CKD, likely prerenal in nature  resolved with good UOP  continue IV hydration 50cc /hr    #Heme  breast Ca dx 2005 s/p chemo radiation surgery metastatic with mets to liver, lung, bone ([facility] seen to be advanced in 2016 on chemo last was yesterday  continue anti nausea medication   tylenol as needed although catuiously with transminitis at this time  [facility] consulted should follow up reccs    #Endo  f/u A1c in [place] , SSI    #DVT PPx lovenox    GOC: patient verbalized that would liekly not like to be CPR or intubated but at this point would defer choice to brother      For Follow-Up:  [ ] LLLE duplex  [ ] Lactate  [ ] Follow MRSA PCR  [ ] Follow FIONA  [ ] Continue antibiotics   [ ] Follow [facility] MICU Transfer Note  ---------------------------    Transfer from: MICU  Transfer to:  (x  ) Medicine    (  ) Telemetry    (  ) RCU    (  ) Palliative    (  ) Stroke Unit    (  ) _______________  Accepting Physician: Dr. Bush/ Ronen Ramsey ([facility]). [phone] day. [phone] night      MICU COURSE  55F with PMHx of GERD, bilateral hip surgeries, gallstones,  breast Ca dx 2005 s/p chemo radiation surgery metastatic with mets to liver, lung, bone (follows with [email or web address]) seen to be advanced in 2016 on chemo last was yesterday presenting with fevers x 1 day and fatigue now with rash on left inner thigh concerning for cellulitis started on abx s/p 3L and midodrine 20 briefly requiring pressors for hypovolemic and distributive septic shock  from rash inducing group A strep bacteremia now off IV pressors since 05/29th on midodrine and zosyn and stable for the floors.      OBJECTIVE --  Vital Signs Last 24 Hrs  T(C): 37.2 (30 May 2020 12:00), Max: 38.3 (29 May 2020 16:00)  T(F): 98.9 (30 May 2020 12:00), Max: 101 (29 May 2020 16:00)  HR: 83 (30 May 2020 13:00) (77 - 110)  BP: 123/59 (30 May 2020 13:00) (98/57 - 139/69)  BP(mean): 85 (30 May 2020 13:00) (71 - 103)  RR: 26 (30 May 2020 13:00) (18 - 59)  SpO2: 95% (30 May 2020 13:00) (86% - 98%)    I&O's Summary    29 May 2020 07:01  -  30 May 2020 07:00  --------------------------------------------------------  IN: 3583.5 mL / OUT: 2175 mL / NET: 1408.5 mL    30 May 2020 07:01  -  30 May 2020 14:26  --------------------------------------------------------  IN: 416 mL / OUT: 300 mL / NET: 116 mL        MEDICATIONS  (STANDING):  chlorhexidine 4% Liquid 1 Application(s) Topical <User Schedule>  dextrose 5% + lactated ringers. 1000 milliLiter(s) (50 mL/Hr) IV Continuous <Continuous>  enoxaparin Injectable 40 milliGRAM(s) SubCutaneous daily  midodrine. 20 milliGRAM(s) Oral every 8 hours  pantoprazole    Tablet 40 milliGRAM(s) Oral before breakfast  piperacillin/tazobactam IVPB.. 3.375 Gram(s) IV Intermittent every 8 hours  polyethylene glycol 3350 17 Gram(s) Oral two times a day  senna Syrup 5 milliLiter(s) Oral daily    MEDICATIONS  (PRN):  benzonatate 100 milliGRAM(s) Oral three times a day PRN Cough  ipratropium    for Nebulization 500 MICROGram(s) Nebulizer every 6 hours PRN Shortness of Breath and/or Wheezing  ondansetron    Tablet 4 milliGRAM(s) Oral every 8 hours PRN Nausea and/or Vomiting        LABS                                            8.9                   Neurophils% (auto):   x      (05-30 @ 01:09):    1.42 )-----------(138          Lymphocytes% (auto):  x                                             28.7                   Eosinphils% (auto):   x        Manual%: Neutrophils x    ; Lymphocytes x    ; Eosinophils x    ; Bands%: x    ; Blasts x                                    132    |  101    |  8                   Calcium: 7.5   / iCa: x      (05-30 @ 01:09)    ----------------------------<  120       Magnesium: 2.1                              3.5     |  23     |  0.65             Phosphorous: 1.2      TPro  4.4    /  Alb  1.8    /  TBili  1.8    /  DBili  x      /  AST  154    /  ALT  38     /  AlkPhos  121    30 May 2020 01:09    ( 05-30 @ 01:09 )   PT: 20.6 sec;   INR: 1.78 ratio  aPTT: 52.6 sec          ASSESSMENT & PLAN:   55F with PMHx of GERD, bilateral hip surgeries, gallstones,  breast Ca dx 2005 s/p chemo radiation surgery metastatic with mets to liver, lung, bone (follows with [facility]) seen to be advanced in 2016 on chemo last was yesterday presenting with fevers x 1 day and fatigue now with rash on left inner thigh concerning for cellulitis started on abx s/p 3L and midodrine 20 briefly requiring pressors for hypovolemic and distributive septic shock  from rash inducing group A strep bacteremia now off IV pressors since 05/29th on midodrine and zosyn and stable for the floors.    #Neuro  alert oriented x3    #Resp  satting well on RA, no dicomfort    #CV  no history of ACS, not on aspirin or plavix, no chest pain, no history of heart failure  currently in shock likely distributive in the setting of infection after recent chemo  EKG sinus tach, no chest pain  now off levo titrate to MAP of >65  continue midodrine 20mg q8hrs to wean off pressors  continue to trend lactate    #GI  history of gerd,, with current heart burn symptoms  will continue protonix  regular diet  transminities improved  Constipation; started bowel regimen     #ID  s/p clindamycin, vanc, cefepime in the ED  concerning for cellulitis vs compartment syndrome although with no air seen on CT scan   continue Zosyn  UA negative,   5/28 blood clx growing strep A pyogenes   ID consulted: will continue zosyn for now as improvement, f/u sensitivities  surgery consulted in the off chance patient develops nec fasc vs compartment syndrome although low suspicion given clinical presentation and imaging  no specimen available for wound culture  COVID PCR neg 5/28    #Renal  no gomez  AYAAN on CKD, likely prerenal in nature  resolved with good UOP  continue IV hydration 50cc /hr    #Heme  breast Ca dx 2005 s/p chemo radiation surgery metastatic with mets to liver, lung, bone (follows with [facility]) seen to be advanced in 2016 on chemo last was yesterday  continue anti nausea medication   tylenol as needed although catuiously with transminitis at this time  [facility] consulted should follow up reccs    #Endo  f/u A1c in Am , SSI    #DVT PPx lovenox    GOC: patient verbalized that would liekly not like to be CPR or intubated but at this point would defer choice to brother      For Follow-Up:  [ ] LLE duplex  [ ] f/u CT lower extremity   [ ] Lactate  [ ] Follow up TTE (ordered)  [ ] Continue antibiotics   [ ] Follow BCx

## 2020-05-30 NOTE — PROGRESS NOTE ADULT - SUBJECTIVE AND OBJECTIVE BOX
INTERVAL HPI/OVERNIGHT EVENTS:    SUBJECTIVE: Patient seen and examined at bedside.   Reports abdominal distention, last BM was 2 days ago.   Reports Left leg pain.   As per surgery, no signs of compartment syndrome.   Febrile overnight. Received 1 dose of vanco overnight.   Lactate 2.2     VITAL SIGNS:  ICU Vital Signs Last 24 Hrs  T(C): 36.9 (30 May 2020 09:00), Max: 38.3 (29 May 2020 16:00)  T(F): 98.5 (30 May 2020 09:00), Max: 101 (29 May 2020 16:00)  HR: 94 (30 May 2020 10:00) (77 - 110)  BP: 129/67 (30 May 2020 10:00) (98/57 - 139/69)  BP(mean): 92 (30 May 2020 10:00) (71 - 103)  ABP: --  ABP(mean): --  RR: 33 (30 May 2020 10:00) (18 - 59)  SpO2: 97% (30 May 2020 10:00) (78% - 98%)      Plateau pressure:   P/F ratio:      @ 07:01  -   @ 07:00  --------------------------------------------------------  IN: 3583.5 mL / OUT: 2175 mL / NET: 1408.5 mL     @ 07:01  -   @ 12:34  --------------------------------------------------------  IN: 150 mL / OUT: 0 mL / NET: 150 mL      CAPILLARY BLOOD GLUCOSE      POCT Blood Glucose.: 136 mg/dL (29 May 2020 09:47)    ECG:    PHYSICAL EXAM:    Gen: AAOx3, non-toxic  Head: NCAT  HEENT: EOMI, oral mucosa moist, normal conjunctiva  Lung: CTAB, no respiratory distress, no wheezes/rhonchi/rales B/L, speaking in full sentences  CV: RRR, no murmurs, rubs or gallops  Abd: soft, distended, no guarding  MSK: no visible deformities  Neuro: No focal sensory or motor deficits, normal CN exam. Lower legs nuerovascularlly intact.   Skin: Left inner thight cellulitis decreasing in size.      MEDICATIONS:  MEDICATIONS  (STANDING):  chlorhexidine 4% Liquid 1 Application(s) Topical <User Schedule>  dextrose 5% + lactated ringers. 1000 milliLiter(s) (50 mL/Hr) IV Continuous <Continuous>  enoxaparin Injectable 40 milliGRAM(s) SubCutaneous daily  midodrine. 20 milliGRAM(s) Oral every 8 hours  pantoprazole    Tablet 40 milliGRAM(s) Oral before breakfast  piperacillin/tazobactam IVPB.. 3.375 Gram(s) IV Intermittent every 8 hours  polyethylene glycol 3350 17 Gram(s) Oral two times a day  senna Syrup 5 milliLiter(s) Oral daily    MEDICATIONS  (PRN):  albuterol/ipratropium for Nebulization 3 milliLiter(s) Nebulizer every 6 hours PRN Shortness of Breath and/or Wheezing  benzonatate 100 milliGRAM(s) Oral three times a day PRN Cough  ondansetron    Tablet 4 milliGRAM(s) Oral every 8 hours PRN Nausea and/or Vomiting      ALLERGIES:  Allergies    Crestor (Joint Pain; Other)  HMG-CoA reductase inhibitors (Unknown)  Lipitor (Rash)  statins (Joint Pain; Rash)  Zocor (Joint Pain; Other)    Intolerances        LABS:                        8.9    1.42  )-----------( 138      ( 30 May 2020 01:09 )             28.7     05-30    132<L>  |  101  |  8   ----------------------------<  120<H>  3.5   |  23  |  0.65    Ca    7.5<L>      30 May 2020 01:09  Phos  1.2     05-30  Mg     2.1     05-30    TPro  4.4<L>  /  Alb  1.8<L>  /  TBili  1.8<H>  /  DBili  x   /  AST  154<H>  /  ALT  38  /  AlkPhos  121<H>  05-30    PT/INR - ( 30 May 2020 01:09 )   PT: 20.6 sec;   INR: 1.78 ratio         PTT - ( 30 May 2020 01:09 )  PTT:52.6 sec  Urinalysis Basic - ( 28 May 2020 12:53 )    Color: Light Yellow / Appearance: Clear / S.049 / pH: x  Gluc: x / Ketone: Trace  / Bili: Negative / Urobili: Negative   Blood: x / Protein: Trace / Nitrite: Negative   Leuk Esterase: Small / RBC: 1 /hpf / WBC 8 /HPF   Sq Epi: x / Non Sq Epi: 8 /hpf / Bacteria: Negative        RADIOLOGY & ADDITIONAL TESTS: Reviewed.

## 2020-05-30 NOTE — CONSULT NOTE ADULT - SUBJECTIVE AND OBJECTIVE BOX
Patient is a 55y old  Female who presents with a chief complaint of weakness fevers, (30 May 2020 12:33)      HPI:  HPI: 55F with PMHx of GERD, bilateral hip surgeries, breast Ca dx 2005 s/p chemo radiation surgery metastatic with mets to liver, lung, bone (follows with [facility]) seen to be advanced in 2016 on chemo last was yesterday presenting with fevers x 1 day and fatigue. She reports CONTE, unable to walk steps without feeling weak. She has been tolerating PO although with some diarrhea after chemo no blood in stool or vomit . Denies nausea/vomiting, dysuria, cough, sputum production. Previously had pleurex catheter on L side, now removed. Patient also notes painful rash on L thigh, noticed this AM that has progressed, denies trauma or breakage of skin. No animals in the home, no recent travel. She says "all I do is chemotherapy and sleep". No new moisturizers detergents. Never on blood thinner or with blod clot. never with cardiac history. (28 May 2020 19:08)    Pt seen and examined at bedside in the ICU.   No overnight event. feels okay. still very weak, fatigue.   no cp, no sob, no n/v/d.   the rash getting better, but occasional pain.       PAST MEDICAL & SURGICAL HISTORY:  H/O pleural effusion: Left treated 9/2019  Breast Cancer h/o chemo and radiation in 2005  HPV (Human Papillomavirus) in 2004  GERD (Gastroesophageal Reflux Disease)  H/O: Osteoarthritis  Congenital Hip Dysplasia  Hyperlipidemia - allergic to all statin drugs - on no meds  H/O pleural effusion: S/P Pleurx catheter and medi-port placement 9/6/2019  Cryo ablation of cervix in 2006  h/o  Breast Lumpectomyn - left 2005  Breast Cancer - chemoport insertion  and removal 2005 and 2006  H/O: Myomectomy 2003  History of Total Hip Replacement left 2003: right thr 2011      FAMILY HISTORY:  FH: type 2 diabetes      SOCIAL HISTORY: Denied smoking, EtOH history. Denied illicit drug use.     Allergies:  Crestor (Joint Pain; Other)  HMG-CoA reductase inhibitors (Unknown)  Lipitor (Rash)  statins (Joint Pain; Rash)  Zocor (Joint Pain; Other)    Intolerances          REVIEW OF SYSEMS:  General: + weakness, + fever/chills, no weight loss/gain, + LE rash  Skin/Breast: no rash, no jaundice  Ophthalmologic: no vision changes, no dry eyes   Respiratory and Thorax: no cough, no wheezing, no hemoptysis, no dyspnea  Cardiovascular: no chest pain, no shortness of breath, no orthopnea  Gastrointestinal: no n/v/d, no abdominal pain, no dysphagia   Genitourinary: no dysuria, no frequency, no nocturia, no hematuria  Musculoskeletal: no trauma, no sprain/strain, no myalgias, no arthralgias, no fracture  Neurological: no HA, no dizziness, no weakness, no numbness  Psychiatric: no depression, no SI/HI  Hematology/Lymphatics: no easy bruising  Endocrine: no heat or cold intolerance. no weight gain or loss  Allergic/Immunologic: no allergy or recent reaction       Vital Signs Last 24 Hrs  T(C): 37.2 (30 May 2020 12:00), Max: 37.6 (29 May 2020 20:00)  T(F): 98.9 (30 May 2020 12:00), Max: 99.7 (30 May 2020 04:00)  HR: 87 (30 May 2020 18:00) (77 - 110)  BP: 123/58 (30 May 2020 18:00) (105/55 - 139/69)  BP(mean): 82 (30 May 2020 18:00) (73 - 98)  RR: 44 (30 May 2020 18:00) (18 - 59)  SpO2: 95% (30 May 2020 18:00) (86% - 98%)  I&O's Summary    29 May 2020 07:01  -  30 May 2020 07:00  --------------------------------------------------------  IN: 3583.5 mL / OUT: 2175 mL / NET: 1408.5 mL    30 May 2020 07:01  -  30 May 2020 18:32  --------------------------------------------------------  IN: 466 mL / OUT: 600 mL / NET: -134 mL        PHYSICAL EXAM:  GENERAL: NAD, Comfortable, lying in bed, fatigue  HEAD:  Atraumatic, Normocephalic  EYES: EOMI, PERRLA, conjunctiva and sclera clear  NECK: Supple, No JVD  CHEST/LUNG: Clear to auscultation bilaterally; No wheeze  HEART: Regular rate and rhythm; No murmurs, rubs, or gallops  ABDOMEN: Soft, Nontender, Nondistended; Bowel sounds present  Neuro: AAOx3, no focal deficit, 5/5 b/l extremities  EXTREMITIES:  2+ Peripheral Pulses, No clubbing, cyanosis, or edema, +left thigh cellulitis, fading in color  SKIN: No rashes or lesions      LABS:                        8.9    1.42  )-----------( 138      ( 30 May 2020 01:09 )             28.7     05-30    132<L>  |  101  |  8   ----------------------------<  120<H>  3.5   |  23  |  0.65    Ca    7.5<L>      30 May 2020 01:09  Phos  1.2     05-30  Mg     2.1     05-30    TPro  4.4<L>  /  Alb  1.8<L>  /  TBili  1.8<H>  /  DBili  x   /  AST  154<H>  /  ALT  38  /  AlkPhos  121<H>  05-30    PT/INR - ( 30 May 2020 01:09 )   PT: 20.6 sec;   INR: 1.78 ratio         PTT - ( 30 May 2020 01:09 )  PTT:52.6 sec  CAPILLARY BLOOD GLUCOSE        CARDIAC MARKERS ( 28 May 2020 20:11 )  x     / x     / 779 U/L / x     / x              RADIOLOGY & ADDITIONAL TESTS:    Imaging Personally Reviewed:  [x] YES  [ ] NO    Consultant(s) Notes Reviewed:  [x] YES  [ ] NO      MEDICATIONS  (STANDING):  chlorhexidine 4% Liquid 1 Application(s) Topical <User Schedule>  enoxaparin Injectable 40 milliGRAM(s) SubCutaneous daily  midodrine. 20 milliGRAM(s) Oral every 8 hours  pantoprazole    Tablet 40 milliGRAM(s) Oral before breakfast  piperacillin/tazobactam IVPB.. 3.375 Gram(s) IV Intermittent every 8 hours  polyethylene glycol 3350 17 Gram(s) Oral two times a day  senna Syrup 5 milliLiter(s) Oral daily    MEDICATIONS  (PRN):  benzonatate 100 milliGRAM(s) Oral three times a day PRN Cough  ipratropium    for Nebulization 500 MICROGram(s) Nebulizer every 6 hours PRN Shortness of Breath and/or Wheezing  ondansetron    Tablet 4 milliGRAM(s) Oral every 8 hours PRN Nausea and/or Vomiting      Care Discussed with Consultants/Other Providers [x] YES  [ ] NO    HEALTH ISSUES - PROBLEM Dx:

## 2020-05-31 NOTE — PROGRESS NOTE ADULT - ASSESSMENT
55 F with PMHx of GERD, bilateral hip surgeries, gallstones,  breast Ca dx 2005 s/p chemo radiation surgery metastatic with mets to liver, lung, bone (follows with [facility]) seen to be advanced in 2016 on chemo last was yesterday presenting with fevers x 1 day and fatigue now with rash on left inner thigh concerning for cellulitis started on abx s/p 3L and midodrine 20 briefly requiring pressors for hypovolemic and distributive septic shock  from rash inducing group A strep bacteremia now off IV pressors since 05/29th on midodrine and zosyn and stable for the floors.    # Septic shock due to Group A strep Pyogenes bacteremia  # Stage IV breast caner currently on chemo  # Chemo induced Neutropenia   # AYAAN on CKD, likely prerenal in nature  # Transminities improved  # Constipation    Plan:  s/p clindamycin, vanc, cefepime in the ED  concerning for cellulitis vs compartment syndrome although with no air seen on CT scan   5/28 blood clx growing strep A pyogenes   House ID eval appreciated. continue Zosyn IV. plan of de-escalation per ID  off levo, continue midodrine 20mg q8hrs, wean as tolearted, BP still soft. will given gentle IVF x 12 hrs today  VA dupelx neg for DVT. CT LE with IV contrast r/o myonecrosis.  Pending TTE, though low suspicion for endocarditis per ID. No need for mediport removal.   UA negative. COVID PCR neg 5/28  Surgery on the case in the off chance patient develops nec fasc vs compartment syndrome although low suspicion   AYAAN resolved, Cr back to normal s/p IV hydration   Breast Ca dx 2005 s/p chemo radiation surgery metastatic with mets to liver, lung, bone (follows with [email or web address]) seen to be advanced in 2016 on chemo last was yesterday  [facility]. no chemo while pt is inpatient.   continue anti nausea medication   multiple episodes of BM today. will hold stool softeners. If remain watery off stool softeners, will check c.diff PCR.   Physical therapy. Out of bed to chair with assistance.   DVT ppx    Pt of Dr. Jayden Landrum ([facility] oncology)    - Dr. RUSSELL Bush ([facility])  - [phone]

## 2020-05-31 NOTE — PROGRESS NOTE ADULT - SUBJECTIVE AND OBJECTIVE BOX
Surgery Progress Note    S: Patient seen and examined. No acute events overnight. Reports tolerating diet without nausea, vomiting, passing flatus, having bowel movements. Pain improving, reports pain in the medial leg.     O:  PHYSICAL EXAM:   General: NAD, Lying in bed comfortably. A&Ox3  Resp: No additional work of breathing.  Cardio: RRR, off pressors  GI/Abd: Soft, NT/ND, no masses palpated  Skin: Left medial thigh skin intact, no breakdown, erythema, tender, blanching, no crepitus  LE: No pain with passive foot extension or calf tenderness    Vitals   Vital Signs Last 24 Hrs  T(C): 37 (31 May 2020 04:59), Max: 38.2 (30 May 2020 20:00)  T(F): 98.6 (31 May 2020 04:59), Max: 100.8 (30 May 2020 20:00)  HR: 83 (31 May 2020 04:59) (72 - 101)  BP: 97/57 (31 May 2020 04:59) (92/58 - 136/69)  BP(mean): 74 (31 May 2020 02:00) (73 - 92)  RR: 20 (31 May 2020 04:59) (20 - 44)  SpO2: 95% (31 May 2020 04:59) (93% - 98%)    LABS:                        9.2    1.89  )-----------( 151      ( 31 May 2020 07:15 )             30.1     05-31    136  |  104  |  5<L>  ----------------------------<  103<H>  3.4<L>   |  22  |  0.63    Ca    7.9<L>      31 May 2020 06:34  Phos  2.9     05-31  Mg     2.0     05-31    TPro  4.4<L>  /  Alb  1.8<L>  /  TBili  1.6<H>  /  DBili  x   /  AST  148<H>  /  ALT  37  /  AlkPhos  230<H>  05-31    PT/INR - ( 31 May 2020 07:41 )   PT: 16.2 sec;   INR: 1.39 ratio         PTT - ( 30 May 2020 23:26 )  PTT:34.9 sec      I&O's Detail    30 May 2020 07:01  -  31 May 2020 07:00  --------------------------------------------------------  IN:    dextrose 5% + lactated ringers.: 200 mL    IV PiggyBack: 200 mL    Oral Fluid: 120 mL    Solution: 166 mL  Total IN: 686 mL    OUT:    Voided: 1900 mL  Total OUT: 1900 mL    Total NET: -1214 mL

## 2020-05-31 NOTE — PROGRESS NOTE ADULT - ASSESSMENT
55F with PMHx of GERD, bilateral hip surgeries, breast Ca dx 2005 s/p chemo radiation surgery metastatic with mets to liver, lung, bone on chemo presenting with left thigh soft tissue infection. Patient non-septic, afebrile. vitals stable without pressors. No clinical signs of necrotizing soft tissue infection. Likely cellulitis, now improving on antibiotics.       Recommendations   - No acute surgical intervention at this time  - Continue IV abx  - Monitor for increase area of erythema, as of now improving  - Please call with further question. No role for surgery at present.     Trauma Surgery  [id number]

## 2020-05-31 NOTE — PROGRESS NOTE ADULT - SUBJECTIVE AND OBJECTIVE BOX
Patient is a 55y old  Female who presents with a chief complaint of weakness fevers, (31 May 2020 10:11)      SUBJECTIVE / OVERNIGHT EVENTS:  Transferred out of ICU to the floor  doing well  3 x BM so far.  loose this am but now watery per RN  last stool softener was yesterday. will hold  no cp, no sob, no n/v/d. no abdominal pain.  no headache, no dizziness.         Vital Signs Last 24 Hrs  T(C): 37.1 (31 May 2020 09:48), Max: 38.2 (30 May 2020 20:00)  T(F): 98.7 (31 May 2020 09:48), Max: 100.8 (30 May 2020 20:00)  HR: 83 (31 May 2020 04:59) (72 - 92)  BP: 97/57 (31 May 2020 04:59) (92/58 - 126/67)  BP(mean): 74 (31 May 2020 02:00) (73 - 91)  RR: 20 (31 May 2020 09:48) (20 - 44)  SpO2: 98% (31 May 2020 09:48) (95% - 98%)  I&O's Summary    30 May 2020 07:01  -  31 May 2020 07:00  --------------------------------------------------------  IN: 686 mL / OUT: 1900 mL / NET: -1214 mL    31 May 2020 07:01  -  31 May 2020 15:09  --------------------------------------------------------  IN: 750 mL / OUT: 0 mL / NET: 750 mL      PHYSICAL EXAM:  GENERAL: NAD, Comfortable, lying in bed, on room air  HEAD:  Atraumatic, Normocephalic  EYES: EOMI, PERRLA, conjunctiva and sclera clear  NECK: Supple, No JVD  CHEST/LUNG: Clear to auscultation bilaterally; No wheeze  HEART: Regular rate and rhythm; No murmurs, rubs, or gallops  ABDOMEN: Soft, Nontender, Nondistended; Bowel sounds present  Neuro: AAOx3, no focal deficit, 5/5 b/l extremities  EXTREMITIES:  2+ Peripheral Pulses, No clubbing, cyanosis, or edema, +left thigh cellulitis, fading in color  SKIN: No rashes or lesions      LABS:                        9.2    1.89  )-----------( 151      ( 31 May 2020 07:15 )             30.1     05-31    136  |  104  |  5<L>  ----------------------------<  103<H>  3.4<L>   |  22  |  0.63    Ca    7.9<L>      31 May 2020 06:34  Phos  2.9     05-31  Mg     2.0     05-31    TPro  4.4<L>  /  Alb  1.8<L>  /  TBili  1.6<H>  /  DBili  x   /  AST  148<H>  /  ALT  37  /  AlkPhos  230<H>  05-31    PT/INR - ( 31 May 2020 07:41 )   PT: 16.2 sec;   INR: 1.39 ratio         PTT - ( 30 May 2020 23:26 )  PTT:34.9 sec  CAPILLARY BLOOD GLUCOSE                RADIOLOGY & ADDITIONAL TESTS:    Imaging Personally Reviewed:  [x] YES  [ ] NO    Consultant(s) Notes Reviewed:  [x] YES  [ ] NO      MEDICATIONS  (STANDING):  enoxaparin Injectable 40 milliGRAM(s) SubCutaneous daily  midodrine. 20 milliGRAM(s) Oral every 8 hours  pantoprazole    Tablet 40 milliGRAM(s) Oral before breakfast  piperacillin/tazobactam IVPB.. 3.375 Gram(s) IV Intermittent every 8 hours  potassium chloride    Tablet ER 20 milliEquivalent(s) Oral every 2 hours    MEDICATIONS  (PRN):  benzonatate 100 milliGRAM(s) Oral three times a day PRN Cough  ipratropium    for Nebulization 500 MICROGram(s) Nebulizer every 6 hours PRN Shortness of Breath and/or Wheezing  ondansetron    Tablet 4 milliGRAM(s) Oral every 8 hours PRN Nausea and/or Vomiting      Care Discussed with Consultants/Other Providers [x] YES  [ ] NO    HEALTH ISSUES - PROBLEM Dx:

## 2020-06-01 NOTE — PROGRESS NOTE ADULT - SUBJECTIVE AND OBJECTIVE BOX
INFECTIOUS DISEASES FOLLOW UP--Trey Cantrell MD  Pager [phone]    This is a follow up note for this  55y Female with  Cellulitis L thigh.  GroupAstrep bacteremia.  No fevers  Pain still present but lessened.  Pain with moving L thigh.    Further ROS:  CONSTITUTIONAL:  No fever, good appetite  CARDIOVASCULAR:  No chest pain or palpitations  RESPIRATORY:  No dyspnea  GASTROINTESTINAL:  No nausea, vomiting, diarrhea, or abdominal pain  GENITOURINARY:  No dysuria  NEUROLOGIC:  No headache,     Allergies    Crestor (Joint Pain; Other)  HMG-CoA reductase inhibitors (Unknown)  Lipitor (Rash)  statins (Joint Pain; Rash)  Zocor (Joint Pain; Other)    ANTIBIOTICS/RELEVANT:  antimicrobials  piperacillin/tazobactam IVPB.. 3.375 Gram(s) IV Intermittent every 8 hours    OTHER:  benzonatate 100 milliGRAM(s) Oral three times a day PRN  enoxaparin Injectable 40 milliGRAM(s) SubCutaneous daily  ipratropium    for Nebulization 500 MICROGram(s) Nebulizer every 6 hours PRN  midodrine. 20 milliGRAM(s) Oral every 8 hours  ondansetron    Tablet 4 milliGRAM(s) Oral every 8 hours PRN  pantoprazole    Tablet 40 milliGRAM(s) Oral before breakfast  sodium chloride 0.9%. 1000 milliLiter(s) IV Continuous <Continuous>    Objective:  Vital Signs Last 24 Hrs  T(C): 36.7 (01 Jun 2020 12:00), Max: 37.2 (31 May 2020 19:14)  T(F): 98.1 (01 Jun 2020 12:00), Max: 98.9 (31 May 2020 19:14)  HR: 86 (01 Jun 2020 12:00) (73 - 94)  BP: 102/70 (01 Jun 2020 12:00) (92/61 - 112/69)  BP(mean): --  RR: 20 (01 Jun 2020 12:00) (18 - 20)  SpO2: 97% (01 Jun 2020 12:00) (95% - 98%)    PHYSICAL EXAM:  Constitutional:no acute distress port site ok  Eyes:DAWIT, EOMI  Ear/Nose/Throat: no oral lesions, 	  Respiratory: clear BL  Cardiovascular: S1S2  Gastrointestinal:soft, (+) BS, no tenderness  L thigh faint erythema, no bullae, no crepitus, no induration...  No Lymphadenopathy  IV sites not inflammed.    LABS:                        9.2    1.89  )-----------( 151      ( 31 May 2020 07:15 )             30.1     06-01    138  |  106  |  <4<L>  ----------------------------<  95  3.4<L>   |  24  |  0.55    Ca    7.5<L>      01 Jun 2020 06:30  Phos  2.9     05-31  Mg     2.0     05-31    TPro  4.4<L>  /  Alb  1.8<L>  /  TBili  1.6<H>  /  DBili  x   /  AST  148<H>  /  ALT  37  /  AlkPhos  230<H>  05-31  PT/INR - ( 31 May 2020 07:41 )   PT: 16.2 sec;   INR: 1.39 ratio    PTT - ( 30 May 2020 23:26 )  PTT:34.9 sec    RADIOLOGY & ADDITIONAL STUDIES:    < from: CT Pelvis w/ IV Cont (05.30.20 @ 23:11) >    Subcutaneous stranding at the left anterior lateral aspect of the pelvis, about the left hip, and in the left thigh. Correlate clinically for possible cellulitis. No rim-enhancing abscess collections or soft tissue gas are seen in the thighs.  Redemonstrated multiple mixed lytic and sclerotic lesions in the bony pelvis, lower lumbar spine, and proximal left femur, concerning for metastatic disease. Correlate clinically.  No acute displaced fracture.    < end of copied text >

## 2020-06-01 NOTE — PROGRESS NOTE ADULT - ASSESSMENT
Imp/rx:  No fevers  Pain is not worse and the appearance on exam of the L thigh is not worse vs my exam of 5/29.  Difficult organism but should be sensitive.    Suggest check differential on the CBC.  If anc is over 500 I think we should de-escalate the zosyn.

## 2020-06-01 NOTE — PROGRESS NOTE ADULT - ASSESSMENT
55 F with PMHx of GERD, bilateral hip surgeries, gallstones,  breast Ca dx 2005 s/p chemo radiation surgery metastatic with mets to liver, lung, bone (follows with [facility]) seen to be advanced in 2016 on chemo last was yesterday presenting with fevers x 1 day and fatigue now with rash on left inner thigh concerning for cellulitis started on abx s/p 3L and midodrine 20 briefly requiring pressors for hypovolemic and distributive septic shock  from rash inducing group A strep bacteremia now off IV pressors since 05/29th on midodrine and zosyn and stable for the floors.    # Septic shock due to Group A strep Pyogenes bacteremia  # Stage IV breast caner currently on chemo  # Chemo induced Neutropenia   # AYAAN on CKD, likely prerenal in nature  # Transminities improved  # Constipation    Plan:  s/p clindamycin, vanc, cefepime in the ED  concerning for cellulitis vs compartment syndrome although with no air seen on CT scan   5/28 blood clx growing strep A pyogenes   House ID eval appreciated. continue Zosyn IV. plan of de-escalation per ID. will consider switching to Unasyn if neutropenia stable. (am CBC pendig)  off levo, continue midodrine 20mg q8hrs, wean as tolearted, BP still soft. s/p gentle IVF x 12 hrs   VA dupelx neg for DVT. CT LE with IV contrast r/o myonecrosis.  TTE reviewed, low suspicion for endocarditis per ID. No need for mediport removal.   UA negative. COVID PCR neg 5/28  Surgery on the case in the off chance patient develops nec fasc vs compartment syndrome although low suspicion   AYAAN resolved, Cr back to normal s/p IV hydration   Breast Ca dx 2005 s/p chemo radiation surgery metastatic with mets to liver, lung, bone (follows with [facility]) seen to be advanced in 2016 on chemo last was yesterday  Discussed with [facility]. no chemo while pt is inpatient.   continue anti nausea medication   multiple episodes of BM today. will hold stool softeners. If remain watery off stool softeners, will check c.diff PCR. stable for now  Physical therapy. Out of bed to chair with assistance.   DVT ppx    [facility]    - Dr. RUSSELL Bush ([facility])  - [phone]

## 2020-06-01 NOTE — PROGRESS NOTE ADULT - SUBJECTIVE AND OBJECTIVE BOX
Patient is a 55y old  Female who presents with a chief complaint of weakness fevers, (01 Jun 2020 13:52)      SUBJECTIVE / OVERNIGHT EVENTS:  feels her abdomen has fluid  US abd shows only small ascites  easily full  feel okay, fatigue  no cp, no sob, no n/v/d. no abdominal pain.  no headache, no dizziness.       Vital Signs Last 24 Hrs  T(C): 36.9 (01 Jun 2020 17:00), Max: 37 (01 Jun 2020 04:46)  T(F): 98.4 (01 Jun 2020 17:00), Max: 98.6 (01 Jun 2020 04:46)  HR: 78 (01 Jun 2020 17:00) (73 - 86)  BP: 99/55 (01 Jun 2020 17:00) (96/64 - 107/71)  BP(mean): --  RR: 20 (01 Jun 2020 17:00) (18 - 20)  SpO2: 97% (01 Jun 2020 17:00) (95% - 98%)  I&O's Summary    31 May 2020 07:01  -  01 Jun 2020 07:00  --------------------------------------------------------  IN: 2330 mL / OUT: 400 mL / NET: 1930 mL    01 Jun 2020 07:01  -  01 Jun 2020 21:00  --------------------------------------------------------  IN: 1820 mL / OUT: 0 mL / NET: 1820 mL      PHYSICAL EXAM:  GENERAL: NAD, Comfortable, lying in bed, on room air  HEAD:  Atraumatic, Normocephalic  EYES: EOMI, PERRLA, conjunctiva and sclera clear  NECK: Supple, No JVD  CHEST/LUNG: Clear to auscultation bilaterally; No wheeze  HEART: Regular rate and rhythm; No murmurs, rubs, or gallops  ABDOMEN: Soft, Nontender, Nondistended; Bowel sounds present  Neuro: AAOx3, no focal deficit, 5/5 b/l extremities  EXTREMITIES:  2+ Peripheral Pulses, No clubbing, cyanosis, or edema, +left thigh cellulitis, fading in color  SKIN: No rashes or lesions      LABS:                        9.2    1.89  )-----------( 151      ( 31 May 2020 07:15 )             30.1     06-01    138  |  106  |  <4<L>  ----------------------------<  95  3.4<L>   |  24  |  0.55    Ca    7.5<L>      01 Jun 2020 06:30  Phos  2.9     05-31  Mg     2.0     05-31    TPro  4.4<L>  /  Alb  1.8<L>  /  TBili  1.6<H>  /  DBili  x   /  AST  148<H>  /  ALT  37  /  AlkPhos  230<H>  05-31    PT/INR - ( 31 May 2020 07:41 )   PT: 16.2 sec;   INR: 1.39 ratio         PTT - ( 30 May 2020 23:26 )  PTT:34.9 sec  CAPILLARY BLOOD GLUCOSE                RADIOLOGY & ADDITIONAL TESTS:    Imaging Personally Reviewed:  [x] YES  [ ] NO    Consultant(s) Notes Reviewed:  [x] YES  [ ] NO      MEDICATIONS  (STANDING):  enoxaparin Injectable 40 milliGRAM(s) SubCutaneous daily  midodrine. 20 milliGRAM(s) Oral every 8 hours  pantoprazole    Tablet 40 milliGRAM(s) Oral before breakfast  piperacillin/tazobactam IVPB.. 3.375 Gram(s) IV Intermittent every 8 hours  sodium chloride 0.9%. 1000 milliLiter(s) (75 mL/Hr) IV Continuous <Continuous>    MEDICATIONS  (PRN):  benzocaine 15 mG/menthol 3.6 mG (Sugar-Free) Lozenge 1 Lozenge Oral four times a day PRN Sore Throat  benzonatate 100 milliGRAM(s) Oral three times a day PRN Cough  ipratropium    for Nebulization 500 MICROGram(s) Nebulizer every 6 hours PRN Shortness of Breath and/or Wheezing  ondansetron    Tablet 4 milliGRAM(s) Oral every 8 hours PRN Nausea and/or Vomiting      Care Discussed with Consultants/Other Providers [x] YES  [ ] NO    HEALTH ISSUES - PROBLEM Dx:

## 2020-06-02 NOTE — PROGRESS NOTE ADULT - SUBJECTIVE AND OBJECTIVE BOX
Patient is a 55y old  Female who presents with a chief complaint of weakness fevers, (02 Jun 2020 12:17)      SUBJECTIVE / OVERNIGHT EVENTS:  feels well overall  no cp, no sob, no n/v/d. no abdominal pain.  no headache, no dizziness.   Feels her abdomen "too big". +weight gain.  US abd with limited ascites   BP too soft to give diuretics.         Vital Signs Last 24 Hrs  T(C): 36.7 (02 Jun 2020 14:13), Max: 37.4 (01 Jun 2020 22:05)  T(F): 98 (02 Jun 2020 14:13), Max: 99.3 (01 Jun 2020 22:05)  HR: 76 (02 Jun 2020 14:13) (76 - 90)  BP: 105/72 (02 Jun 2020 14:13) (99/55 - 107/68)  BP(mean): --  RR: 18 (02 Jun 2020 14:13) (18 - 20)  SpO2: 97% (02 Jun 2020 14:13) (94% - 98%)  I&O's Summary    01 Jun 2020 07:01  -  02 Jun 2020 07:00  --------------------------------------------------------  IN: 2020 mL / OUT: 300 mL / NET: 1720 mL    02 Jun 2020 07:01  -  02 Jun 2020 15:28  --------------------------------------------------------  IN: 360 mL / OUT: 0 mL / NET: 360 mL      PHYSICAL EXAM:  GENERAL: NAD, Comfortable, lying in bed, on room air  HEAD:  Atraumatic, Normocephalic  EYES: EOMI, PERRLA, conjunctiva and sclera clear  NECK: Supple, No JVD  CHEST/LUNG: Clear to auscultation bilaterally; No wheeze  HEART: Regular rate and rhythm; No murmurs, rubs, or gallops  ABDOMEN: Soft, Nontender, mildly distended; Bowel sounds present  Neuro: AAOx3, no focal deficit, 5/5 b/l extremities  EXTREMITIES:  2+ Peripheral Pulses, No clubbing, cyanosis, or edema, +left thigh cellulitis, fading in color  SKIN: No rashes or lesions      LABS:                        10.3   2.56  )-----------( 207      ( 02 Jun 2020 10:52 )             35.0     06-01    138  |  106  |  <4<L>  ----------------------------<  95  3.4<L>   |  24  |  0.55    Ca    7.5<L>      01 Jun 2020 06:30        CAPILLARY BLOOD GLUCOSE                RADIOLOGY & ADDITIONAL TESTS:    Imaging Personally Reviewed:  [x] YES  [ ] NO    Consultant(s) Notes Reviewed:  [x] YES  [ ] NO      MEDICATIONS  (STANDING):  ampicillin  IVPB      ampicillin  IVPB 2 Gram(s) IV Intermittent every 6 hours  enoxaparin Injectable 40 milliGRAM(s) SubCutaneous daily  melatonin 5 milliGRAM(s) Oral at bedtime  midodrine. 20 milliGRAM(s) Oral every 8 hours  pantoprazole    Tablet 40 milliGRAM(s) Oral before breakfast    MEDICATIONS  (PRN):  benzocaine 15 mG/menthol 3.6 mG (Sugar-Free) Lozenge 1 Lozenge Oral four times a day PRN Sore Throat  benzonatate 100 milliGRAM(s) Oral three times a day PRN Cough  ipratropium    for Nebulization 500 MICROGram(s) Nebulizer every 6 hours PRN Shortness of Breath and/or Wheezing  ondansetron    Tablet 4 milliGRAM(s) Oral every 8 hours PRN Nausea and/or Vomiting      Care Discussed with Consultants/Other Providers [x] YES  [ ] NO    HEALTH ISSUES - PROBLEM Dx:

## 2020-06-02 NOTE — PROGRESS NOTE ADULT - ATTENDING COMMENTS
Ronen Crockett will be covering for me starting 6/3/20. He can be reached at  if needed.     - Dr. RUSSELL Bush ([facility])  - [phone]
55F with PMHx of GERD, breast Ca dx 2005 s/p chemo/radiation/surgery metastatic  to liver, lung, bone and just on chemo p/w L thigh cellulitis and septic shock now w/ strep bacteremia. Pt hemodynamically stable off pressors. COnt abx. f/u ID reccs. Monitor cellulitic area/erythema. No surgical intervention needed now. Plan for transfer to floors
Patient seen and examined on rounds  Non-toxic appearing  No new complaints  Awake, alert, oriented x 4  Hemodynamically stable without need for vasopressors / fluids to maintain BP  Tmax = 38.3 (yesterday)  Oxygenating well  Left thigh / groin erythema not advancing on my exam  Coags slowly increasing last 3 days    - Although patient with admitted significant systemic signs of sepsis, hemodynamics have improved and stayed stable x 24 hours.  I feel that the risks outweigh the benefits for wide debridement of cellulitis in OR at this time.  Will continue to follow.  - Continue antibiotics as per ID
Patient examined and case reviewed in detail on bedside rounds    Critically ill on pressors with group A strep bacteremia/cellulitis/s/p chemo Close consult with ID/surgery    Frequent bedside visits with therapy change today. Crit Care Time Today 35 min +

## 2020-06-02 NOTE — PROGRESS NOTE ADULT - ASSESSMENT
Imp/rx:  L thigh cellulitis with group A strep infection.  No clinical evidence of necrotizing infection.  Improving clinically.  Not neutroepenic.    will change to iv ampicillin.  Possibly change to oral abx for dc in next 48 hours    no evidence of joint infection.

## 2020-06-02 NOTE — PROGRESS NOTE ADULT - SUBJECTIVE AND OBJECTIVE BOX
INFECTIOUS DISEASES FOLLOW UP--Trey Cantrell MD  Pager [phone]    This is a follow up note for this  55y Female with  Cellulitis and group A strep bacteremia.  L thigh feels much better.    Further ROS:  CONSTITUTIONAL:  No fever, good appetite  CARDIOVASCULAR:  No chest pain or palpitations  RESPIRATORY:  No dyspnea  GASTROINTESTINAL:  No nausea, vomiting, diarrhea, or abdominal pain  GENITOURINARY:  No dysuria  NEUROLOGIC:  No headache,     Allergies  Crestor (Joint Pain; Other)  HMG-CoA reductase inhibitors (Unknown)  Lipitor (Rash)  statins (Joint Pain; Rash)  Zocor (Joint Pain; Other)    ANTIBIOTICS/RELEVANT:  antimicrobials  piperacillin/tazobactam IVPB.. 3.375 Gram(s) IV Intermittent every 8 hours    OTHER:  benzocaine 15 mG/menthol 3.6 mG (Sugar-Free) Lozenge 1 Lozenge Oral four times a day PRN  benzonatate 100 milliGRAM(s) Oral three times a day PRN  enoxaparin Injectable 40 milliGRAM(s) SubCutaneous daily  ipratropium    for Nebulization 500 MICROGram(s) Nebulizer every 6 hours PRN  melatonin 5 milliGRAM(s) Oral at bedtime  midodrine. 20 milliGRAM(s) Oral every 8 hours  ondansetron    Tablet 4 milliGRAM(s) Oral every 8 hours PRN  pantoprazole    Tablet 40 milliGRAM(s) Oral before breakfast  sodium chloride 0.9%. 1000 milliLiter(s) IV Continuous <Continuous>    Objective:  Vital Signs Last 24 Hrs  T(C): 36.7 (02 Jun 2020 08:00), Max: 37.4 (01 Jun 2020 22:05)  T(F): 98.1 (02 Jun 2020 08:00), Max: 99.3 (01 Jun 2020 22:05)  HR: 82 (02 Jun 2020 08:00) (78 - 90)  BP: 107/22 (02 Jun 2020 08:00) (99/55 - 107/68)  BP(mean): --  RR: 20 (02 Jun 2020 08:00) (20 - 20)  SpO2: 96% (02 Jun 2020 08:00) (94% - 98%)    PHYSICAL EXAM:  Constitutional:no acute distress  Eyes:DAWIT, EOMI  Ear/Nose/Throat: no oral lesions, 	  Respiratory: clear BL  Cardiovascular: S1S2  Gastrointestinal:soft, (+) BS, no tenderness  Extremities: L thigh faint redness, no crepitus, bullae, ulcers, induration  No Lymphadenopathy  IV sites not inflammed.    LABS:                        10.3   2.56  )-----------( 207      ( 02 Jun 2020 10:52 )             35.0     06-01    138  |  106  |  <4<L>  ----------------------------<  95  3.4<L>   |  24  |  0.55    Ca    7.5<L>      01 Jun 2020 06:30    RADIOLOGY & ADDITIONAL STUDIES:    < from: US Abdomen Limited (06.01.20 @ 14:50) >      Trace to small amount of ascites as described above.    Small left pleural effusion.      < end of copied text >

## 2020-06-02 NOTE — PROGRESS NOTE ADULT - ASSESSMENT
55 F with PMHx of GERD, bilateral hip surgeries, gallstones,  breast Ca dx 2005 s/p chemo radiation surgery metastatic with mets to liver, lung, bone (follows with [facility]) seen to be advanced in 2016 on chemo last was yesterday presenting with fevers x 1 day and fatigue now with rash on left inner thigh concerning for cellulitis started on abx s/p 3L and midodrine 20 briefly requiring pressors for hypovolemic and distributive septic shock  from rash inducing group A strep bacteremia now off IV pressors since 05/29th on midodrine and zosyn and stable for the floors.    # Septic shock due to Group A strep Pyogenes bacteremia  # Stage IV breast caner currently on chemo  # Chemo induced Neutropenia   # AYAAN on CKD, likely prerenal in nature  # Transminities improved  # Constipation    Plan:  s/p clindamycin, vanc, cefepime in the ED  concerning for cellulitis vs compartment syndrome although with no air seen on CT scan   5/28 blood clx growing strep A pyogenes   House ID eval appreciated. on Zosyn IV. switched to Unasyn 6/2/20  off levo, continue midodrine 20mg q8hrs, wean as tolearted, BP still soft. s/p gentle IVF x 12 hrs but held given abdominal distention.   Feels her abdomen "too big". +weight gain. US abd with limited ascites BP too soft to give diuretics. can repeat abd US to access ascites if worsen.   VA dupelx neg for DVT. CT LE with IV contrast r/o myonecrosis.  TTE reviewed, low suspicion for endocarditis per ID. No need for mediport removal.   UA negative. COVID PCR neg 5/28  Surgery on the case in the off chance patient develops nec fasc vs compartment syndrome although low suspicion   AYAAN resolved, Cr back to normal s/p IV hydration   Breast Ca dx 2005 s/p chemo radiation surgery metastatic with mets to liver, lung, bone (follows with [facility]) seen to be advanced in 2016 on chemo last was yesterday  Discussed with [facility]. no chemo while pt is inpatient.   continue anti nausea medication   constipation, resolved post laxatives.  Physical therapy. Out of bed to chair with assistance.   DVT ppx    Pt of Dr. Jayden Landrum ([facility])

## 2020-06-03 NOTE — PHYSICAL THERAPY INITIAL EVALUATION ADULT - PERTINENT HX OF CURRENT PROBLEM, REHAB EVAL
56 yo F h/o breast Ca with mets to liver, lung, bone, p/w fevers x1 day and fatigue.  She reports CONTE, unable to walk steps without feeling weak. Pt also with rash on left inner thigh concerning for cellulitis started on abx briefly requiring pressors for hypovolemic and distributive septic shock  from rash inducing group A strep bacteremia now off IV pressors since 05/29th.

## 2020-06-03 NOTE — PHYSICAL THERAPY INITIAL EVALUATION ADULT - BALANCE TRAINING, PT EVAL
GOAL: Pt will increase static/ dynamic standing balance to Good- to improve safety with functional activities in 2 weeks.

## 2020-06-03 NOTE — PROGRESS NOTE ADULT - SUBJECTIVE AND OBJECTIVE BOX
INFECTIOUS DISEASES FOLLOW UP--Trey Cantrell MD  Pager [phone]    This is a follow up note for this  55y Female with  L thigh cellulitis from GA strep and bacteremia.  much improved.  Very little discomfort.    Further ROS:  CONSTITUTIONAL:  No fever, good appetite  CARDIOVASCULAR:  No chest pain or palpitations  RESPIRATORY:  No dyspnea  GASTROINTESTINAL:  No nausea, vomiting, diarrhea, or abdominal pain  GENITOURINARY:  No dysuria  NEUROLOGIC:  No headache,     Allergies  Crestor (Joint Pain; Other)  HMG-CoA reductase inhibitors (Unknown)  Lipitor (Rash)  statins (Joint Pain; Rash)  Zocor (Joint Pain; Other)    ANTIBIOTICS/RELEVANT:  antimicrobials  ampicillin  IVPB      ampicillin  IVPB 2 Gram(s) IV Intermittent every 6 hours    OTHER:  benzocaine 15 mG/menthol 3.6 mG (Sugar-Free) Lozenge 1 Lozenge Oral four times a day PRN  benzonatate 100 milliGRAM(s) Oral three times a day PRN  enoxaparin Injectable 40 milliGRAM(s) SubCutaneous daily  ipratropium    for Nebulization 500 MICROGram(s) Nebulizer every 6 hours PRN  melatonin 2 milliGRAM(s) Oral at bedtime  melatonin 5 milliGRAM(s) Oral at bedtime  midodrine. 20 milliGRAM(s) Oral every 8 hours  ondansetron    Tablet 4 milliGRAM(s) Oral every 8 hours PRN  pantoprazole    Tablet 40 milliGRAM(s) Oral before breakfast  simethicone 160 milliGRAM(s) Chew two times a day    Objective:  Vital Signs Last 24 Hrs  T(C): 36.8 (03 Jun 2020 08:20), Max: 36.8 (02 Jun 2020 16:00)  T(F): 98.3 (03 Jun 2020 08:20), Max: 98.3 (03 Jun 2020 00:12)  HR: 94 (03 Jun 2020 08:20) (76 - 94)  BP: 118/76 (03 Jun 2020 08:20) (11/78 - 118/76)  BP(mean): --  RR: 18 (03 Jun 2020 08:20) (16 - 18)  SpO2: 96% (03 Jun 2020 08:20) (95% - 97%)    PHYSICAL EXAM:  Constitutional:no acute distress  Eyes:DAWIT, EOMI  Ear/Nose/Throat: no oral lesions, 	  Respiratory: clear BL  Cardiovascular: S1S2  Gastrointestinal:soft, (+) BS, no tenderness  Extremities:no e/e/c....L thigh very faint erythema.  No induration/crepitus, not tender to touch.  Joint wnl  No Lymphadenopathy  IV sites not inflammed.    LABS:                        9.8    1.84  )-----------( 243      ( 03 Jun 2020 10:36 )             32.3     06-03    134<L>  |  104  |  <4<L>  ----------------------------<  161<H>  3.4<L>   |  23  |  0.54    Ca    7.7<L>      03 Jun 2020 10:36    imp/rx:  Doing well from ID perspective with the L thigh.  suggest continue iv ampicillin until tomorrow AM and then plan dc on po abx----  amoxicillin 500 mg three times a day for one week.  I do not think that the L hip joint is involved.  will need f/u with her oncology team, [facility]. with low WBC count---will defer on any need for neupogen.    call with questions.

## 2020-06-03 NOTE — PHYSICAL THERAPY INITIAL EVALUATION ADULT - PRECAUTIONS/LIMITATIONS, REHAB EVAL
CONT: CT LLE 5/30: Subcutaneous stranding at the left anterior lateral aspect of the pelvis, about the left hip, and in the left thigh.  No rim-enhancing abscess collections or soft tissue gas are seen in the thighs. Redemonstrated multiple mixed lytic and sclerotic lesions in the bony pelvis, lower lumbar spine, and proximal left femur, concerning for metastatic disease.CT Abd 5/28: No evidence of an infectious source in the chest/abdomen/pelvis. Small loculated left pleural effusion. Osseous metastatic disease as at prior PET CT.

## 2020-06-03 NOTE — PHYSICAL THERAPY INITIAL EVALUATION ADULT - GAIT DEVIATIONS NOTED, PT EVAL
increased time in double stance/decreased christopher/decreased velocity of limb motion/decreased stride length/decreased weight-shifting ability

## 2020-06-03 NOTE — PROGRESS NOTE ADULT - SUBJECTIVE AND OBJECTIVE BOX
Still feels abdomen swollen  (+)BM  Occasional nausea no vomiting    Vital Signs Last 24 Hrs  T(C): 36.8 (03 Jun 2020 08:20), Max: 36.8 (02 Jun 2020 16:00)  T(F): 98.3 (03 Jun 2020 08:20), Max: 98.3 (03 Jun 2020 00:12)  HR: 94 (03 Jun 2020 08:20) (76 - 94)  BP: 118/76 (03 Jun 2020 08:20) (11/78 - 118/76)  BP(mean): --  RR: 18 (03 Jun 2020 08:20) (16 - 18)  SpO2: 96% (03 Jun 2020 08:20) (95% - 97%)    I&O's Summary    06-02-20 @ 07:01  -  06-03-20 @ 07:00  --------------------------------------------------------  IN: 1360 mL / OUT: 300 mL / NET: 1060 mL    06-03-20 @ 07:01  -  06-03-20 @ 10:52  --------------------------------------------------------  IN: 240 mL / OUT: 400 mL / NET: -160 mL          PHYSICAL EXAM:  GENERAL: NAD, Comfortable, lying in bed, on room air  HEAD:  Atraumatic, Normocephalic  EYES: EOMI, PERRLA, conjunctiva and sclera clear  NECK: Supple, No JVD  CHEST/LUNG: Clear to auscultation bilaterally; No wheeze  HEART: Regular rate and rhythm; No murmurs, rubs, or gallops  ABDOMEN: Soft, Nontender, mildly distended; Bowel sounds present  Neuro: AAOx3, no focal deficit, 5/5 b/l extremities  EXTREMITIES:  2+ Peripheral Pulses, No clubbing, cyanosis, or edema, +left thigh cellulitis, fading in color  SKIN: No rashes or lesions    LABS:                        10.3   2.56  )-----------( 207      ( 02 Jun 2020 10:52 )             35.0             CAPILLARY BLOOD GLUCOSE                RADIOLOGY & ADDITIONAL TESTS:    Imaging Personally Reviewed:  [x] YES  [ ] NO    Consultant(s) Notes Reviewed:  [x] YES  [ ] NO

## 2020-06-03 NOTE — DISCHARGE NOTE NURSING/CASE MANAGEMENT/SOCIAL WORK - PATIENT PORTAL LINK FT
You can access the FollowMyHealth Patient Portal offered by [facility] by registering at the following website: [email or web address]. By joining [employer]’s FollowMyHealth portal, you will also be able to view your health information using other applications (apps) compatible with our system.

## 2020-06-03 NOTE — PROGRESS NOTE ADULT - ASSESSMENT
55 F with PMHx of GERD, bilateral hip surgeries, gallstones,  breast Ca dx 2005 s/p chemo radiation surgery metastatic with mets to liver, lung, bone (follows with [facility]) seen to be advanced in 2016 on chemo last was yesterday presenting with fevers x 1 day and fatigue now with rash on left inner thigh concerning for cellulitis started on abx s/p 3L and midodrine 20 briefly requiring pressors for hypovolemic and distributive septic shock  from rash inducing group A strep bacteremia now off IV pressors since 05/29th on midodrine and zosyn and stable for the floors.    # Septic shock due to Group A strep Pyogenes bacteremia  # Stage IV breast caner currently on chemo  # Chemo induced Neutropenia   # AYAAN on CKD, likely prerenal in nature  # Transminities improved  # Constipation    Plan:  s/p clindamycin, vanc, cefepime in the ED  concerning for cellulitis vs compartment syndrome although with no air seen on CT scan   5/28 blood clx growing strep A pyogenes   House ID eval appreciated. on Zosyn IV. switched to Unasyn 6/2/20  off levo, continue midodrine 20mg q8hrs, wean as tolearted, BP still soft. s/p gentle IVF x 12 hrs but held given abdominal distention.   Feels her abdomen "too big". +weight gain. US abd with limited ascites BP too soft to give diuretics. can repeat abd US to access ascites if worsen.  Will try simethicone  VA dupelx neg for DVT. CT LE with IV contrast r/o myonecrosis.  TTE reviewed, low suspicion for endocarditis per ID. No need for mediport removal.   UA negative. COVID PCR neg 5/28  Surgery on the case in the off chance patient develops nec fasc vs compartment syndrome although low suspicion   AYAAN resolved, Cr back to normal s/p IV hydration   Breast Ca dx 2005 s/p chemo radiation surgery metastatic with mets to liver, lung, bone (follows with [facility]) seen to be advanced in 2016 on chemo last was yesterday  Discussed with [facility]. no chemo while pt is inpatient.   continue anti nausea medication   constipation, resolved post laxatives.  Physical therapy. Out of bed to chair with assistance.   DVT ppx    Pt of Dr. Jayden Landrum ([facility])

## 2020-06-03 NOTE — PHYSICAL THERAPY INITIAL EVALUATION ADULT - ADDITIONAL COMMENTS
pt lives in private home, ramp to enter, 1 flight of stairs to bedroom. 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 Prior to admission, pt was I with all functional mobility and ADLs without AD. Pt drives herself to/ from chemo.

## 2020-06-04 NOTE — DISCHARGE NOTE PROVIDER - NSDCCPCAREPLAN_GEN_ALL_CORE_FT
PRINCIPAL DISCHARGE DIAGNOSIS  Diagnosis: Cellulitis  Assessment and Plan of Treatment:       SECONDARY DISCHARGE DIAGNOSES  Diagnosis: Sepsis  Assessment and Plan of Treatment:     Diagnosis: Hypotension  Assessment and Plan of Treatment: PRINCIPAL DISCHARGE DIAGNOSIS  Diagnosis: Cellulitis  Assessment and Plan of Treatment: continue with amoxicillin 500 mg TID x 1 week      SECONDARY DISCHARGE DIAGNOSES  Diagnosis: Cancer of breast  Assessment and Plan of Treatment: outpatient follow up with your Oncologist    Diagnosis: Sepsis  Assessment and Plan of Treatment:     Diagnosis: Hypotension  Assessment and Plan of Treatment:

## 2020-06-04 NOTE — DISCHARGE NOTE PROVIDER - NSDCFUADDINST_GEN_ALL_CORE_FT
**Completer oral antibiotic regimen as prescribed            COVID REMINDER  You no longer require hospitalization.  Please restrict activities outside of your home except for getting medical care and simple errand.  Call ahead before visiting your doctor.  Wear a facemask when you are around other people. Cover your cough and sneezes.  Clean your hands often.  Avoid sharing personal household items.  Clean all frequently touched surfaces daily.

## 2020-06-04 NOTE — DISCHARGE NOTE PROVIDER - HOSPITAL COURSE
Reason for Admission: weakness fevers,    History of Present Illness:     HPI: 55F with PMHx of GERD, bilateral hip surgeries, breast Ca dx 2005 s/p chemo radiation surgery metastatic with mets to liver, lung, bone (follows with [facility]) seen to be advanced in 2016 on chemo last was yesterday presenting with fevers x 1 day and fatigue. She reports CONTE, unable to walk steps without feeling weak. She has been tolerating PO although with some diarrhea after chemo no blood in stool or vomit . Denies nausea/vomiting, dysuria, cough, sputum production. Previously had pleurex catheter on L side, now removed. Patient also notes painful rash on L thigh, noticed this AM that has progressed, denies trauma or breakage of skin. No animals in the home, no recent travel. She says "all I do is chemotherapy and sleep". No new moisturizers detergents. Never on blood thinner or with blod clot. never with cardiac history.                    Allergies and Intolerances:          Allergies:    	Lipitor: Drug, Rash    	Crestor: Drug, Joint Pain, Other, leg pain    	Zocor: Drug, Joint Pain, Other, leg pain    	HMG-CoA reductase inhibitors: Drug Category, Unknown, Originally entered in [facility] system as: rash/joint pain to HMG-COA REDUCTASE INHIBITORS (STATINS)    	statins: Drug Category, Joint Pain, Rash        Home Medications:     * Patient Currently Takes Medications as of 28-May-2020 19:19 documented in Structured Notes    · 	chemo med: Last Dose Taken:      · 	omeprazole 40 mg oral delayed release capsule: Last Dose Taken:  , 1 cap(s) orally once a day    · 	Tylenol 325 mg oral capsule: Last Dose Taken:  , 1 cap(s) orally 3 times a day    · 	loratadine 10 mg oral capsule: Last Dose Taken:  , 1 cap(s) orally once a day    · 	Zofran 4 mg oral tablet: Last Dose Taken:  , 1 tab(s) orally every 8 hours        Patient History:      Past Medical, Past Surgical, and Family History:    PAST MEDICAL HISTORY:    Breast Cancer h/o chemo and radiation in 2005     Congenital Hip Dysplasia     GERD (Gastroesophageal Reflux Disease)     H/O pleural effusion Left treated 9/2019    H/O: Osteoarthritis     HPV (Human Papillomavirus) in 2004     Hyperlipidemia - allergic to all statin drugs - on no meds.         PAST SURGICAL HISTORY:    Breast Cancer - chemoport insertion  and removal 2005 and 2006     Cryo ablation of cervix in 2006     h/o  Breast Lumpectomyn - left 2005     H/O pleural effusion S/P Pleurx catheter and medi-port placement 9/6/2019    H/O: Myomectomy 2003     History of Total Hip Replacement left 2003 right thr 2011.         Hospital Course:     5/28: Pt admitted w/ L thigh soft tissue infection. Pt seen by ID: IV Abx started. Blood Cx sent. Pt also seen by General Surgery. No intervention needed, Pt's in-house admission complicated by septic shock . hypotension, and streptococcus bacteremia requiring MICU management (ie pressors). Pt also treated for AYAAN w/IVF hydration. Pt was started om Lovenox QD for dvt prophylaxis    5/31: Pt transferred back to floor, A Venous Doppler, ABD U/S and TTE were all (-)    6/1: Eval by PT: WBAT--suggest home w/ home PT    6/3: Pt continued to improve clinically. ID recommended changing to Amoxicillin PO for an additional week    6/4: XXXXXXX                                                9.8      1.84  )-----------( 243      ( 03 Jun 2020 10:36 )               32.3             VA Duplex Lower Ext Vein Scan, Left (05.31.20 @ 13:15) >    IMPRESSION:     No evidence of left lower extremity deep venous thrombosis.            Culture - Blood (05.28.20 @ 11:53)      Gram Stain:     Growth in aerobic and anaerobic bottles: Gram Positive Cocci in Pairs and    Chains      -  Streptococcus pyogenes (Group A): Detec Reason for Admission: weakness fevers,    History of Present Illness:     HPI: 55F with PMHx of GERD, bilateral hip surgeries, breast Ca dx 2005 s/p chemo radiation surgery metastatic with mets to liver, lung, bone (follows with [facility]) seen to be advanced in 2016 on chemo last was yesterday presenting with fevers x 1 day and fatigue. She reports CONTE, unable to walk steps without feeling weak. She has been tolerating PO although with some diarrhea after chemo no blood in stool or vomit . Denies nausea/vomiting, dysuria, cough, sputum production. Previously had pleurex catheter on L side, now removed. Patient also notes painful rash on L thigh, noticed this AM that has progressed, denies trauma or breakage of skin. No animals in the home, no recent travel. She says "all I do is chemotherapy and sleep". No new moisturizers detergents. Never on blood thinner or with blod clot. never with cardiac history.                    Allergies and Intolerances:          Allergies:    	Lipitor: Drug, Rash    	Crestor: Drug, Joint Pain, Other, leg pain    	Zocor: Drug, Joint Pain, Other, leg pain    	HMG-CoA reductase inhibitors: Drug Category, Unknown, Originally entered in [facility] system as: rash/joint pain to HMG-COA REDUCTASE INHIBITORS (STATINS)    	statins: Drug Category, Joint Pain, Rash        Home Medications:     * Patient Currently Takes Medications as of 28-May-2020 19:19 documented in Structured Notes    · 	chemo med: Last Dose Taken:      · 	omeprazole 40 mg oral delayed release capsule: Last Dose Taken:  , 1 cap(s) orally once a day    · 	Tylenol 325 mg oral capsule: Last Dose Taken:  , 1 cap(s) orally 3 times a day    · 	loratadine 10 mg oral capsule: Last Dose Taken:  , 1 cap(s) orally once a day    · 	Zofran 4 mg oral tablet: Last Dose Taken:  , 1 tab(s) orally every 8 hours        Patient History:      Past Medical, Past Surgical, and Family History:    PAST MEDICAL HISTORY:    Breast Cancer h/o chemo and radiation in 2005     Congenital Hip Dysplasia     GERD (Gastroesophageal Reflux Disease)     H/O pleural effusion Left treated 9/2019    H/O: Osteoarthritis     HPV (Human Papillomavirus) in 2004     Hyperlipidemia - allergic to all statin drugs - on no meds.         PAST SURGICAL HISTORY:    Breast Cancer - chemoport insertion  and removal 2005 and 2006     Cryo ablation of cervix in 2006     h/o  Breast Lumpectomyn - left 2005     H/O pleural effusion S/P Pleurx catheter and medi-port placement 9/6/2019    H/O: Myomectomy 2003     History of Total Hip Replacement left 2003 right thr 2011.         Hospital Course:     5/28: Pt admitted w/ L thigh soft tissue infection. Pt seen by ID: IV Abx started. Blood Cx sent. Pt also seen by General Surgery. No intervention needed, Pt's in-house admission complicated by septic shock . hypotension, and streptococcus bacteremia requiring MICU management (ie pressors). Pt also treated for AYAAN w/IVF hydration. Pt was started om Lovenox QD for dvt prophylaxis    5/31: Pt transferred back to floor, A Venous Doppler, ABD U/S and TTE were all (-)    6/1: Eval by PT: WBAT--suggest home w/ home PT    6/3: Pt continued to improve clinically. ID recommended changing to Amoxicillin PO for an additional week upon discharge    6/4: XXXXXXX                                                9.8      1.84  )-----------( 243      ( 03 Jun 2020 10:36 )               32.3             VA Duplex Lower Ext Vein Scan, Left (05.31.20 @ 13:15) >    IMPRESSION:     No evidence of left lower extremity deep venous thrombosis.            Culture - Blood (05.28.20 @ 11:53)      Gram Stain:     Growth in aerobic and anaerobic bottles: Gram Positive Cocci in Pairs and    Chains      -  Streptococcus pyogenes (Group A): Detec Reason for Admission: weakness fevers,    History of Present Illness:     HPI: 55F with PMHx of GERD, bilateral hip surgeries, breast Ca dx 2005 s/p chemo radiation surgery metastatic with mets to liver, lung, bone (follows with [facility]) seen to be advanced in 2016 on chemo last was yesterday presenting with fevers x 1 day and fatigue. She reports CONTE, unable to walk steps without feeling weak. She has been tolerating PO although with some diarrhea after chemo no blood in stool or vomit . Denies nausea/vomiting, dysuria, cough, sputum production. Previously had pleurex catheter on L side, now removed. Patient also notes painful rash on L thigh, noticed this AM that has progressed, denies trauma or breakage of skin. No animals in the home, no recent travel. She says "all I do is chemotherapy and sleep". No new moisturizers detergents. Never on blood thinner or with blod clot. never with cardiac history.                    Allergies and Intolerances:          Allergies:    	Lipitor: Drug, Rash    	Crestor: Drug, Joint Pain, Other, leg pain    	Zocor: Drug, Joint Pain, Other, leg pain    	HMG-CoA reductase inhibitors: Drug Category, Unknown, Originally entered in [facility] system as: rash/joint pain to HMG-COA REDUCTASE INHIBITORS (STATINS)    	statins: Drug Category, Joint Pain, Rash        Home Medications:     * Patient Currently Takes Medications as of 28-May-2020 19:19 documented in Structured Notes    · 	chemo med: Last Dose Taken:      · 	omeprazole 40 mg oral delayed release capsule: Last Dose Taken:  , 1 cap(s) orally once a day    · 	Tylenol 325 mg oral capsule: Last Dose Taken:  , 1 cap(s) orally 3 times a day    · 	loratadine 10 mg oral capsule: Last Dose Taken:  , 1 cap(s) orally once a day    · 	Zofran 4 mg oral tablet: Last Dose Taken:  , 1 tab(s) orally every 8 hours        Patient History:      Past Medical, Past Surgical, and Family History:    PAST MEDICAL HISTORY:    Breast Cancer h/o chemo and radiation in 2005     Congenital Hip Dysplasia     GERD (Gastroesophageal Reflux Disease)     H/O pleural effusion Left treated 9/2019    H/O: Osteoarthritis     HPV (Human Papillomavirus) in 2004     Hyperlipidemia - allergic to all statin drugs - on no meds.         PAST SURGICAL HISTORY:    Breast Cancer - chemoport insertion  and removal 2005 and 2006     Cryo ablation of cervix in 2006     h/o  Breast Lumpectomyn - left 2005     H/O pleural effusion S/P Pleurx catheter and medi-port placement 9/6/2019    H/O: Myomectomy 2003     History of Total Hip Replacement left 2003 right thr 2011.         Hospital Course:     5/28: Pt admitted w/ L thigh soft tissue infection. Pt seen by ID: IV Abx started. Blood Cx sent. Pt also seen by General Surgery. No intervention needed, Pt's in-house admission complicated by septic shock . hypotension, and streptococcus bacteremia requiring MICU management (ie pressors). Pt also treated for AYAAN w/IVF hydration. Pt was started om Lovenox QD for dvt prophylaxis    5/31: Pt transferred back to floor, A Venous Doppler, ABD U/S and TTE were all (-)    6/1: Eval by PT: WBAT--suggest home w/ home PT    6/3: Pt continued to improve clinically. ID recommended changing to Amoxicillin PO for an additional week upon discharge    6/4: On day of discharge, the patient was tolerating diet, ambulating well and pain controlled Reason for Admission: weakness fevers,    History of Present Illness:     HPI: 55F with PMHx of GERD, bilateral hip surgeries, breast Ca dx 2005 s/p chemo radiation surgery metastatic with mets to liver, lung, bone (follows with [facility]) seen to be advanced in 2016 on chemo last was yesterday presenting with fevers x 1 day and fatigue. She reports CONTE, unable to walk steps without feeling weak. She has been tolerating PO although with some diarrhea after chemo no blood in stool or vomit . Denies nausea/vomiting, dysuria, cough, sputum production. Previously had pleurex catheter on L side, now removed. Patient also notes painful rash on L thigh, noticed this AM that has progressed, denies trauma or breakage of skin.             Patient History:      Past Medical, Past Surgical, and Family History:    PAST MEDICAL HISTORY:    Breast Cancer h/o chemo and radiation in 2005     Congenital Hip Dysplasia     GERD (Gastroesophageal Reflux Disease)     H/O pleural effusion Left treated 9/2019    H/O: Osteoarthritis     HPV (Human Papillomavirus) in 2004     Hyperlipidemia - allergic to all statin drugs - on no meds.         PAST SURGICAL HISTORY:    Breast Cancer - chemoport insertion  and removal 2005 and 2006     Cryo ablation of cervix in 2006     h/o  Breast Lumpectomyn - left 2005     H/O pleural effusion S/P Pleurx catheter and medi-port placement 9/6/2019    H/O: Myomectomy 2003     History of Total Hip Replacement left 2003 right thr 2011.         Hospital Course:     5/28: Pt admitted w/ L thigh soft tissue infection. Pt seen by ID: IV Abx started. Blood Cx sent. Pt also seen by General Surgery. No intervention needed, Pt's in-house admission complicated by septic shock . hypotension, and streptococcus bacteremia requiring MICU management (ie pressors). Pt also treated for AYAAN w/IVF hydration. Pt was started om Lovenox QD for dvt prophylaxis    5/31: Pt transferred back to floor, A Venous Doppler, ABD U/S and TTE were all (-)    6/1: Eval by PT: WBAT--suggest home w/ home PT    6/3: Pt continued to improve clinically. ID recommended changing to Amoxicillin PO for an additional week upon discharge    6/4: On day of discharge, the patient was tolerating diet, ambulating well and pain controlled

## 2020-06-04 NOTE — DISCHARGE NOTE PROVIDER - CARE PROVIDER_API CALL
Trey Cantrell  INFECTIOUS DISEASE  [address] [place] [id number]  Phone: [phone]  Fax: [phone]  Follow Up Time:

## 2020-06-04 NOTE — DISCHARGE NOTE PROVIDER - NSDCACTIVITY_GEN_ALL_CORE
Walking - Outdoors allowed/Stairs allowed/Walking - Indoors allowed/No restrictions/Showering allowed

## 2020-06-04 NOTE — PROGRESS NOTE ADULT - ASSESSMENT
55 F with PMHx of GERD, bilateral hip surgeries, gallstones,  breast Ca dx 2005 s/p chemo radiation surgery metastatic with mets to liver, lung, bone (follows with [facility]) seen to be advanced in 2016 on chemo last was yesterday presenting with fevers x 1 day and fatigue now with rash on left inner thigh concerning for cellulitis started on abx s/p 3L and midodrine 20 briefly requiring pressors for hypovolemic and distributive septic shock  from rash inducing group A strep bacteremia now off IV pressors since 05/29th on midodrine and zosyn and stable for the floors.    # Septic shock due to Group A strep Pyogenes bacteremia  # Stage IV breast caner currently on chemo  # Chemo induced Neutropenia   # AYAAN on CKD, likely prerenal in nature  # Transminities improved  # Constipation    Plan:  s/p clindamycin, vanc, cefepime in the ED  concerning for cellulitis vs compartment syndrome although with no air seen on CT scan   5/28 blood clx grew strep A pyogenes   House ID eval appreciated. on Zosyn IV. switched to Unasyn 6/2/20, likely switch to PO prior to d/c later today  off levo, continue midodrine 20mg q8hrs upon discharge  VA dupelx neg for DVT. CT LE with IV contrast r/o myonecrosis.  TTE reviewed, low suspicion for endocarditis per ID. No need for mediport removal.   UA negative. COVID PCR neg 5/28  AYAAN resolved, Cr back to normal s/p IV hydration   Breast Ca dx 2005 s/p chemo radiation surgery metastatic with mets to liver, lung, bone (follows with [facility]) seen to be advanced in 2016 on chemo last was yesterday  Discussed with [facility]. no chemo while pt is inpatient.   continue anti nausea medication   constipation, resolved post laxatives.  Physical therapy. Out of bed to chair with assistance.   DVT ppx  Possible d/c later today    Pt of Dr. Jayden Landrum ([facility])

## 2020-06-04 NOTE — DISCHARGE NOTE PROVIDER - NSDCCPGOAL_GEN_ALL_CORE_FT
To get better and follow your care plan as instructed. To get better and follow your care plan as instructed.  Complete antibiotic regimen for infection

## 2020-06-04 NOTE — PROGRESS NOTE ADULT - REASON FOR ADMISSION
weakness fevers,

## 2020-06-04 NOTE — DISCHARGE NOTE PROVIDER - NSDCFUADDAPPT_GEN_ALL_CORE_FT
Follow up with your private internist / PMD in 2-3 weeks re: general checkup (and possible medication adjustment)   You may follow up with the Infectious Disease team after completion of Antibiotic Regimen  Please call for an appointment

## 2020-06-04 NOTE — DISCHARGE NOTE PROVIDER - NSDCMRMEDTOKEN_GEN_ALL_CORE_FT
chemo med:   loratadine 10 mg oral capsule: 1 cap(s) orally once a day  omeprazole 40 mg oral delayed release capsule: 1 cap(s) orally once a day  Tylenol 325 mg oral capsule: 1 cap(s) orally 3 times a day  Zofran 4 mg oral tablet: 1 tab(s) orally every 8 hours amoxicillin 500 mg oral capsule: 1 cap(s) orally 3 times a day   chemo med:   loratadine 10 mg oral capsule: 1 cap(s) orally once a day  midodrine 10 mg oral tablet: 2 tab(s) orally every 8 hours  omeprazole 40 mg oral delayed release capsule: 1 cap(s) orally once a day  simethicone 80 mg oral tablet, chewable: 2 tab(s) orally 2 times a day  Tylenol 325 mg oral capsule: 1 cap(s) orally 3 times a day  Zofran 4 mg oral tablet: 1 tab(s) orally every 8 hours

## 2020-06-04 NOTE — PROGRESS NOTE ADULT - SUBJECTIVE AND OBJECTIVE BOX
Abdomenal distension somewhat has responded to the simethicone  She is walking out in the hallway several times a day    Vital Signs Last 24 Hrs  T(C): 36.9 (04 Jun 2020 08:10), Max: 36.9 (03 Jun 2020 20:40)  T(F): 98.5 (04 Jun 2020 08:10), Max: 98.5 (04 Jun 2020 08:10)  HR: 85 (04 Jun 2020 08:10) (74 - 89)  BP: 99/61 (04 Jun 2020 08:10) (99/61 - 124/87)  BP(mean): --  RR: 18 (04 Jun 2020 08:10) (18 - 18)  SpO2: 97% (04 Jun 2020 08:10) (93% - 97%)    I&O's Summary    06-03-20 @ 07:01  -  06-04-20 @ 07:00  --------------------------------------------------------  IN: 1380 mL / OUT: 400 mL / NET: 980 mL        PHYSICAL EXAM:  GENERAL: NAD, Comfortable  HEAD:  Atraumatic, Normocephalic  EYES: EOMI, PERRLA, conjunctiva and sclera clear  NECK: Supple, No JVD  CHEST/LUNG: Clear to auscultation bilaterally; No wheeze  HEART: Regular rate and rhythm; No murmurs, rubs, or gallops  ABDOMEN: Soft, Nontender, mildly distended with hollow tympany throughout; no fluid wave; Bowel sounds present  Neuro: AAOx3, no focal deficit, 5/5 b/l extremities  EXTREMITIES:  2+ Peripheral Pulses, No clubbing, cyanosis, or edema, +left thigh cellulitis, fading in color  SKIN: No rashes or lesions    LABS:                        9.8    1.84  )-----------( 243      ( 03 Jun 2020 10:36 )             32.3     06-03    134<L>  |  104  |  <4<L>  ----------------------------<  161<H>  3.4<L>   |  23  |  0.54    Ca    7.7<L>      03 Jun 2020 10:36        CAPILLARY BLOOD GLUCOSE                RADIOLOGY & ADDITIONAL TESTS:    Imaging Personally Reviewed:  [x] YES  [ ] NO    Consultant(s) Notes Reviewed:  [x] YES  [ ] NO

## 2020-07-02 NOTE — H&P ADULT - PROBLEM SELECTOR PLAN 2
Worsening ascites noted on CTAP per patient was noted on PET-CT last week but no windows for tap. Currently with marked distention, likely after receiving fluids with moderate respiratory discomfort  - consider IR consult in AM for diagnostic as well therapeutic tap  - avoid fluids given hypoalbuminemia

## 2020-07-02 NOTE — H&P ADULT - PROBLEM SELECTOR PLAN 3
Worsening liver enzymes with AST/ALT in 300s (previosly AST in mid 100s and ALP in mid 200s) and Tb 1.5. Likely due to recent cytoxan exposure as well underlying bulky mets and fatty liver disease due to unaddressed HLD 2/2 statin intolerance  - INR wnl, continue to trend  - component of ALP elevation likely contributed by bone, check GGT in AM  - CTAP without overt biliary obstruction  - if worsening, consider hepatology evaluation

## 2020-07-02 NOTE — H&P ADULT - NSHPREVIEWOFSYSTEMS_GEN_ALL_CORE
REVIEW OF SYSTEMS:    CONSTITUTIONAL: + weakness, fevers or chills  EYES: No visual changes;  ENT:   No vertigo or throat pain   NECK: No pain or stiffness  RESPIRATORY: +cough/CONTE No , wheezing, hemoptysis  CARDIOVASCULAR: No chest pain or palpitations  GASTROINTESTINAL: No abdominal pain; + nausea, vomiting;  No  constipation. No hemetemesis, melena or hematochezia.  GENITOURINARY: No dysuria, frequency or hematuria  NEUROLOGICAL: No numbness or weakness  SKIN: No itching, burning, rashes, or lesions   HEME: No easy bruising/bleeding  All other review of systems is negative unless indicated above.

## 2020-07-02 NOTE — ED ADULT NURSE REASSESSMENT NOTE - NS ED NURSE REASSESS COMMENT FT1
RVP result pending. Pt admitted to medicine. Midodrine and albumin administered. Report given to ED DANIELA Odom. Admitting MD at bedside.

## 2020-07-02 NOTE — ED PROVIDER NOTE - ATTENDING CONTRIBUTION TO CARE
I saw and evaluated patient with resident. I discussed H+P and MDM with resident. I agree with the statements made by the resident unless otherwise noted.    The care of this patient was in support of the [facility] countermeasures to Covid-19.

## 2020-07-02 NOTE — ED PROVIDER NOTE - OBJECTIVE STATEMENT
55F with PMHx of GERD, bilateral hip surgeries, breast Ca metastatic with mets to liver, lung, bone (follows with [facility]) currently on chemo (last 2  days ago), recent MICU admission for septic shock 2/2 sepsis p/w 1  day of fever, generalized weakness, and lower abdominal cramping. Notes that her abd is distended but is resolving since last admission, as well as chronic cough/diarrhea/nausea

## 2020-07-02 NOTE — H&P ADULT - NSICDXPASTMEDICALHX_GEN_ALL_CORE_FT
PAST MEDICAL HISTORY:  Breast Cancer h/o chemo and radiation in 2005     Congenital Hip Dysplasia     GERD (Gastroesophageal Reflux Disease)     H/O pleural effusion Left treated 9/2019 s/p PleurX removed in 12/2019    H/O: Osteoarthritis     HPV (Human Papillomavirus) in 2004     Hyperlipidemia - allergic to all statin drugs - on no meds

## 2020-07-02 NOTE — H&P ADULT - NSHPSOCIALHISTORY_GEN_ALL_CORE
Lives at home alone,   Ambulates with walker, VNS services in place  NOK Brother Bruno  No smoking or alcohol use

## 2020-07-02 NOTE — H&P ADULT - PROBLEM SELECTOR PLAN 1
Patient currently meeting criteria for severe sepsis with fever, tachycardia, and lactic acidosis soft BPs however unclear source. Recent admission last month strep bacteremia 2/2 L proximal LE cellulitis. DDX: line infection, viral URI, SBP  - Exam with no skin rash, meningismus, or overt exterior line infection  - procal 0.45  - CTAP with no overt source of infection; UA bland  - COVID 19 neg, f/u RVP  - s/p 1.5L in ER, hold further fluids given third spacing  - start midodrine 20 TID (previously on at home) and albumin  - tylenol prn for fever  - monitor diarrhea, consider cdiff   - address ascites as below  - monitor closely for hemodynamic deterioration, low threshold for MICU eval  - ID consult in AM

## 2020-07-02 NOTE — ED ADULT NURSE NOTE - OBJECTIVE STATEMENT
55y female coming in from home c/o fever on chemo. A&Ox3, febrile, tachycardiac and tachypneic. Hx of metastatic breast cancer (partial left mastectomy), mets to liver, lung and bone. Right chest wall port (not accessed in ED)  through which pt received chemo 2 days ago. Pt reports to ED c/o fever x1 day, generalized weakness, and lower abdominal cramping. Pt reports her abd is distended but is improving resolving since last admission (admitted to MICU for sepsis). Pt , as well as chronic cough/diarrhea/nausea 55y female coming in from home c/o fever on chemo. A&Ox3, febrile, tachycardiac and tachypneic. Hx of metastatic breast cancer (partial left mastectomy), mets to liver, lung and bone. Right chest wall port (not accessed in ED)  through which pt received chemo 2 days ago. Pt reports to ED c/o fever x1 day, generalized weakness, and lower abdominal cramping. Pt reports her abd is distended but is improving since last admission (admitted to MICU for sepsis). Pt also reports chronic cough/diarrhea/nausea. Denies chest pain and vomiting. Upon exam, pt A&Ox3. Febrile to 102.5F orally. Tachypneic and tachycardic. Significant sob upon minimal exertion. Lung sounds clear. Abdomen distended but soft.

## 2020-07-02 NOTE — ED PROVIDER NOTE - NS ED ROS FT
CONSTITUTIONAL:  no lightheadedness, no dizziness  EYES: no visual changes, no eye pain  EARS: no ear drainage, no ear pain, no change in hearing  NOSE: no nasal congestion  MOUTH/THROAT: no sore throat  CV: No chest pain, no palpitations  RESP: No SOB  GI: No vomiting  : no dysuria, no hematuria, no flank pain  MSK: no back pain, no extremity pain  SKIN: no rashes  NEURO: no headache, no focal weakness, no decreased sensation/parasthesias   PSYCHIATRIC: no known mental health issues.

## 2020-07-02 NOTE — H&P ADULT - ATTENDING COMMENTS
Georgette Smith  Admitting Hospitalist  Pager [phone]     Case discussed with NP as well as [facility] attedning Dr. Manrique

## 2020-07-02 NOTE — H&P ADULT - PROBLEM SELECTOR PLAN 8
Transitions of Care Status:  1.  Name of PCP: Dr. Fletcher  2.  PCP Contacted on Admission: [ ] Y    [x ] N    3.  PCP contacted at Discharge: [ ] Y    [ ] N    [ ] N/A  4.  Post-Discharge Appointment Date and Location:  5.  Summary of Handoff given to PCP:

## 2020-07-02 NOTE — H&P ADULT - SKIN COMMENTS
LLE proximal rash minimally erythematous, resolved from prior cellulitis LLE proximal rash minimally erythematous, resolved from prior cellulitis; mediport site with no erythema or TTP

## 2020-07-02 NOTE — H&P ADULT - HISTORY OF PRESENT ILLNESS
55F with PMH of breast Ca (dx 2005 with mets to liver, lung, bone, last dose cytoxan on 6/30), GERD, and bilateral hip surgeries,presenting with fevers and generalized weakness x 1 day. Patient endorses that she had cytoxan on 6/30 (no growth factors) after which she has felt weakness. Patient denies     Of note, patient had a recent admission for septic shock 2/2 strep bacteremia 2/2 L proximal LE cellulitis 5/28-6/4 for which was empirically treated with penicillins and discharge on amoxicillin that was completed on 6/10. In addition, patient was on pressors while inpatient, discharged on midodrine 20 TID, which she weaned off checking home BPS and cleared by cardiology during appt on 6/10.      She has had baseline chronic cough and dyspnea on exertion, since end of fall      She reports CONTE, unable to walk steps without feeling weak. She has been tolerating PO although with some diarrhea after chemo no blood in stool or vomit . Denies nausea/vomiting, dysuria, cough, sputum production. Previously had pleurex catheter on L side, now removed. Patient also notes painful rash on L thigh, noticed this AM that has progressed, denies trauma or breakage of skin. No animals in the home, no recent travel. She says "all I do is chemotherapy and sleep". No new moisturizers detergents. Never on blood thinner or with blod clot. never with cardiac history. 55F with PMH of breast Ca (dx 2005 with mets to liver, lung, bone, last dose cytoxan on 6/30), GERD, and bilateral hip surgeries,presenting with fevers and generalized weakness x 1 day. Patient endorses that she had cytoxan on 6/30 (no growth factors) after which she has felt weakness. Patient denies headache, abdominal pain, vomiting, dysuria, or new rashes. DEnies travel or sick contacts. She usually has transient NBNB diarrhea as well as nausea after chemo, which she has not noted today.  She has had baseline chronic cough and dyspnea on exertion, since end of fall due to complex pleural effusion previously warranting pleurX which was removed in 12/2019.     Of note, patient had a recent admission for septic shock 2/2 strep bacteremia 2/2 L proximal LE cellulitis 5/28-6/4 for which was empirically treated with penicillins and discharge on amoxicillin that was completed on 6/10. In addition, patient was on pressors while inpatient, discharged on midodrine 20 TID, which she weaned off checking home BPS and cleared by cardiology during appt on 6/10.     In ED, Tmax 102.5, , SBP initially 130s, and 95% on RA. Labs notable for mild leukocytisis 10 with mild PMN predominance (previously 6 prior to chemotherapy) and lactate 3.0 --> 2.5. She was given Vanco/Zosyn, 1.5L fluids, tylenol x 1, zofran x 1. 55F with PMH of breast Ca (dx 2005 with mets to liver, lung, bone, last dose cytoxan on 6/30), GERD, and bilateral hip surgeries,presenting with fevers and generalized weakness x 1 day. Patient endorses that she had cytoxan on 6/30 (no growth factors) after which she has felt weakness. Patient denies headache, abdominal pain, oral pain, vomiting, dysuria, or new rashes. DEnies travel or sick contacts. She usually has transient NBNB diarrhea as well as nausea after chemo, which she has not noted today.  She has had baseline chronic cough and dyspnea on exertion, since end of fall due to complex pleural effusion previously warranting pleurX which was removed in 12/2019. She has also noted worsening abdominal distention had a PET scan last week was told there was more ascites but no window for therapeutic tap.     Of note, patient had a recent admission for septic shock 2/2 strep bacteremia 2/2 L proximal LE cellulitis 5/28-6/4 for which was empirically treated with penicillins and discharge on amoxicillin that was completed on 6/10. In addition, patient was on pressors while inpatient, discharged on midodrine 20 TID, which she weaned off checking home BPS and cleared by cardiology during appt on 6/10.     In ED, Tmax 102.5, , SBP initially 130s, and 95% on RA. Labs notable for mild leukocytisis 10 with mild PMN predominance (previously 6 prior to chemotherapy) and lactate 3.0 --> 2.5. She was given Vanco/Zosyn, 1.5L fluids, tylenol x 1, zofran x 1.

## 2020-07-02 NOTE — H&P ADULT - CONVERSATION DETAILS
Spoke with patient- know cancer is advanced. Has a brother who is special needs who she is responsible for. She had contemplated DNR/DNI during last admission while she was in the ICU however decided for now to remain full code. However if prognosis is worse would reconsider.

## 2020-07-02 NOTE — ED PROVIDER NOTE - PHYSICAL EXAMINATION
Gen: tired appearing, NAD  Head: NCAT  HEENT: PERRL, MMM, normal conjunctiva, anicteric, neck supple  Lung: CTAB, no adventitious sounds  CV: RRR, no murmurs  Abd: distended, minimal llq ttp, no rebound or guarding, no CVAT  MSK: No edema, no visible deformities  Neuro: Moving all extremity grossly  Skin: Warm and dry, no evidence of rash

## 2020-07-02 NOTE — ED ADULT NURSE NOTE - GENITOURINARY ASSESSMENT
Made an appointment with gerson Mark for 2/28/2020 at 10 am.    Dontae Woodard called patient to schedule.      [phone] - - -

## 2020-07-02 NOTE — H&P ADULT - PROBLEM SELECTOR PLAN 7
.  DVT: lovenox  Diet: regular diet  Ambulate with assistance .  DVT: Lovenox  Diet: regular diet  Ambulate with assistance

## 2020-07-02 NOTE — ED PROVIDER NOTE - CLINICAL SUMMARY MEDICAL DECISION MAKING FREE TEXT BOX
fever, weakness, abd cramping on chemo. Unclear source of sepsis will get labs, ua, CTAP, cxr, covid swab and ivf/abx. fever, weakness, abd cramping on chemo. Unclear source of sepsis will get labs, ua, CTAP, cxr, covid swab and ivf/abx.    KAITLIN Lange MD: 55F with PMHx of GERD, bilateral hip surgeries, breast Ca metastatic with mets to liver, lung, bone (follows with [facility]) currently on chemo (last 2  days ago), recent MICU admission for septic shock 2/2 sepsis p/w 1  day of fever, generalized weakness, and lower abdominal cramping. Notes that her abd is distended but is resolving since last admission, as well as chronic cough/diarrhea/nausea. Ddx includes, however, is not limited to: uti, pna, bacteremia, port infection, acute intraabdominal infection, other. Plan: sepsis labs, CXR, u/a, ucx, CTAP, IVF, empiric abx, TBA

## 2020-07-02 NOTE — H&P ADULT - PROBLEM SELECTOR PLAN 4
Hx of metastatic breast cancer dx in 2015 prior chemo/RT with known mets to brain, liver, and lung, last chemo cytoxan on 6/30  - no neutropenia at this time  - holding chemo  - per outpt onc, cancer is advance and are planning to have GOC with her  - prn zofran for nausea

## 2020-07-02 NOTE — H&P ADULT - NSHPLABSRESULTS_GEN_ALL_CORE
CXR: personally reviewed; overall clear aside from L pleural effusion  EKG ordered  SPoke with Cheyanne VELARDE from [facility] earlier this week; previously no good window to tap

## 2020-07-02 NOTE — ED ADULT NURSE NOTE - NSIMPLEMENTINTERV_GEN_ALL_ED
Implemented All Fall with Harm Risk Interventions:  [place] to call system. Call bell, personal items and telephone within reach. Instruct patient to call for assistance. Room bathroom lighting operational. Non-slip footwear when patient is off stretcher. Physically safe environment: no spills, clutter or unnecessary equipment. Stretcher in lowest position, wheels locked, appropriate side rails in place. Provide visual cue, wrist band, yellow gown, etc. Monitor gait and stability. Monitor for mental status changes and reorient to person, place, and time. Review medications for side effects contributing to fall risk. Reinforce activity limits and safety measures with patient and family. Provide visual clues: red socks.

## 2020-07-02 NOTE — ED ADULT NURSE REASSESSMENT NOTE - NS ED NURSE REASSESS COMMENT FT1
COVID negative. CT performed and resulted. VBG sent to lab. Pt resting comfortably. Will continue to monitor.

## 2020-07-02 NOTE — H&P ADULT - ASSESSMENT
55F with PMH of breast Ca (dx 2005 with mets to liver, lung, bone, last dose cytoxan on 6/30), GERD, and bilateral hip surgeries, presenting with fevers and generalized weakness x 1 day, found to have severe sepsis unclear source

## 2020-07-02 NOTE — ED ADULT NURSE REASSESSMENT NOTE - NS ED NURSE REASSESS COMMENT FT1
Sepsis workup performed. Pt repositioned for comfort. Pt now c/o "shaking." Significant rigors noted. Ab ECHEVARRIA to bedside. Pt unable to stay still for CT.

## 2020-07-03 NOTE — CONSULT NOTE ADULT - PROBLEM SELECTOR RECOMMENDATION 9
f/u blood cx  ua without pyuria  no cough-- no pna on cxr with pleural effusions unchanged  covid neg   no diarrhea  + ascites check abd ultrasound and possible IR paracentesis  cont empiric vanc and zosyn  vanc level to stay around 15-20-- until cx back .

## 2020-07-03 NOTE — CONSULT NOTE ADULT - SUBJECTIVE AND OBJECTIVE BOX
[facility], Division of Infectious Diseases  LIZET Eldridge  [phone]    WHITLEY SILVA  55y, Female  [id number]    HPI:  55F with PMH of breast Ca (dx 2005 with mets to liver, lung, bone, last dose cytoxan on 6/30), GERD, and bilateral hip surgeries, presenting with fevers and generalized weakness x 1 day.   Pt has been on chemo 10 months, recently was changed and  she had cytoxan on 6/30 (no growth factors) after which she has felt weakness.   Then 2 days later with fever and chills so she came to ED,  no, abdominal pain, oral pain, vomiting, dysuria, or new rashes, no cough, no chest pain, no headache  she is sob because of ascites and she feels very uncomfortable.  no sick contacts. She usually has transient NBNB diarrhea as well as nausea after chemo, which she has not noted today.     Of note, patient had a recent admission for septic shock 2/2 strep bacteremia 2/2 L proximal LE cellulitis 5/28-6/4 for  discharge on amoxicillin that was completed on 6/10. That hopsitalization required an ICU stay.        PMH/PSH--  H/O pleural effusion  Breast Cancer h/o chemo and radiation in 2005  HPV (Human Papillomavirus) in 2004  GERD (Gastroesophageal Reflux Disease)  H/O: Osteoarthritis  Congenital Hip Dysplasia  Hyperlipidemia -   H/O pleural effusion-- requiring pleurex catheter  Cryo ablation of cervix in 2006  h/o  Breast Lumpectomyn - left 2005  Breast Cancer - chemoport insertion  and removal 2005 and 2006  H/O: Myomectomy 2003  History of Total Hip Replacement left 2003      Allergies--statins      Medications--  Antibiotics: piperacillin/tazobactam IVPB.. 3.375 Gram(s) IV Intermittent every 8 hours  vancomycin  IVPB 1000 milliGRAM(s) IV Intermittent every 12 hours    Immunologic:   Other: acetaminophen   Tablet .. PRN  lactobacillus acidophilus  midodrine  ondansetron Injectable PRN  simethicone      Social History--  EtOH: denies ***  Tobacco: denies ***  Drug Use: denies ***    Family/Marital History--  FH: type 2 diabetes  single lives with her brother     Remainder not relevant to clinical concern.    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     Pertinent positives and negatives--  Constitutional: + fevers. + Chills. No Rigors.   Eyes: no blurry vision  ENMT: no sore throat   Cardiovascular: No chest pain. No palpitations.  Respiratory: + shortness of breath. No cough.  Gastrointestinal: No nausea or vomiting. No diarrhea or constipation.   Genitourinary: no dysuria   Musculoskeletal: + weakness  Skin: no rash  Neurologic: no headache  Psychiatric: no depression    Review of systems otherwise negative except as previously noted.    Physical Exam--  Vital Signs: T(F): 98.8 (07-03-20 @ 11:10), Max: 100.1 (07-02-20 @ 15:00)  HR: 80 (07-03-20 @ 11:44)  BP: 113/56 (07-03-20 @ 11:44)  RR: 20 (07-03-20 @ 11:44)  SpO2: 98% (07-03-20 @ 11:44)  Wt(kg): --  General: Nontoxic-appearing Female in no acute distress.  HEENT: AT/NC.  Anicteric. +NC, ooropharynx without sores  Neck: Not rigid. No sense of mass.  Nodes: None palpable.  Lungs: Clear bilaterally without rales, wheezing or rhonchi  Heart: Regular rate and rhythm. No Murmur. + port site clean  Abdomen: Bowel sounds present and normoactive. Soft. distended. Nontender.  Back: No spinal tenderness. No costovertebral angle tenderness.   Extremities: No cyanosis or clubbing. trace edema.   Skin: Warm. Dry. Good turgor. No rash. No vasculitic stigmata.  Psychiatric: Appropriate affect and mood for situation.         Laboratory & Imaging Data--  CBC                        9.5    8.06  )-----------( 148      ( 03 Jul 2020 07:24 )             31.3       Chemistries  07-03    140  |  105  |  6<L>  ----------------------------<  86  3.5   |  25  |  0.55    Ca    7.4<L>      03 Jul 2020 07:23  Phos  2.5     07-03  Mg     1.8     07-03    TPro  5.4<L>  /  Alb  2.9<L>  /  TBili  2.0<H>  /  DBili  x   /  AST  250<H>  /  ALT  42  /  AlkPhos  212<H>  07-03      Culture Data    Culture - Urine (collected 02 Jul 2020 15:12)  Source: .Urine Clean Catch (Midstream)  Final Report (03 Jul 2020 10:01):    <10,000 CFU/mL Normal Urogenital Starr      < from: CT Abdomen and Pelvis w/ IV Cont (07.02.20 @ 14:46) >  EXAM:  CT ABDOMEN AND PELVIS IC                            PROCEDURE DATE:  07/02/2020            INTERPRETATION:  CLINICAL INFORMATION: Metastatic breast carcinoma with one day of fever, generalized weakness and lower abdominal cramping. Left lower quadrant tenderness to palpation.    COMPARISON: Prior CT dated 5/28/2020.    PROCEDURE:   CT of the Abdomen and Pelvis was performed with intravenous contrast.   Intravenous contrast: 90 ml Omnipaque 350. 10 ml discarded.  Oral contrast: None.  Sagittal and coronal reformats were performed.    FINDINGS:  LOWER CHEST: New small right pleural effusion. Small loculated left pleural effusion with pleural enhancement and thickening, unchanged. Compressive atelectasis in the left lower lobe. Nodularity along the left major fissure.    LIVER: Hepatomegaly. Again noted, are extensive rounded areas of decreased attenuation throughout both lobes of the liver, without vascular displacement. The overall appearance is similar to that on the prior study dated 5/28/2020. Nodularity of the contour of the liver, particularly in the left lobe.  BILE DUCTS: Normal caliber.  GALLBLADDER: Cholelithiasis.  SPLEEN: Within normal limits.  PANCREAS: Within normal limits.  ADRENALS: Within normal limits.  KIDNEYS/URETERS: Within normal limits.    BLADDER/REPRODUCTIVE ORGANS: Not well visualized secondary to extensive streak artifact in the pelvis.    BOWEL: No bowel obstruction. Appendix is within normal limits.  PERITONEUM: Moderate volume ascites.   VESSELS: Atherosclerotic calcification.  RETROPERITONEUM/LYMPH NODES: A 0.7 x 0.7 cm right retroperitoneal nodule anterior to the right iliac bone (2:66) is unchanged.    ABDOMINAL WALL: Subcutaneous edema.   BONES: Status post bilateral total hip replacement. Diffuse osseous metastatic disease is again noted, similar in appearance to prior study dated 5/28/2020.    IMPRESSION:    Hepatomegaly and nodular contour of the liver, suggestive of cirrhosis. Again noted, are multiple rounded low-attenuation lesions throughout both the right and left lobes of the liver, similar in appearance to 5/28/2020. This may be secondary to geographic fatty infiltration. However, underlying lesions, particularly in the left lobe cannot be excluded. MRI is again suggested for further evaluation.    Small bilateral pleural effusions. The left effusion is again noted to be loculated with pleural enhancement and nodularity.    Moderate volume ascites, increased since 5/28/2020.    Diffuse osseous metastatic disease, similar in appearance to the prior study dated 5/28/2020.          < end of copied text >      COVID-19 PCR . (07.02.20 @ 12:19)    COVID-19 PCR: NotDetec: This test has been validated by [employer] to be accurate;  though it has not been FDA cleared/approved by the usual pathway.  As with all laboratory tests, results should be correlated with clinical  findings.  https://www.fda.gov/media/933625/download  https://www.fda.gov/media/006184/download      Urine Microscopic-Add On (NC) (07.02.20 @ 12:17)    Red Blood Cell - Urine: 7 /hpf    White Blood Cell - Urine: 3 /HPF    Hyaline Casts: 0 /lpf    Bacteria: Negative    Epithelial Cells: 9 /hpf  < from: Xray Chest 1 View- PORTABLE-Urgent (07.02.20 @ 12:47) >  TATION:  HISTORY: Sepsis    TECHNIQUE: A single AP view of the chest was obtained.    COMPARISON: 5/28/2020    FINDINGS:  The cardiac silhouette is normal in size. There is a right-sided Mediport with tip overlying the superior vena cava/right atrial junction. There is a left pleural effusion, unchanged. The hilar and mediastinal structures appear unremarkable. The osseous structures areintact.    IMPRESSION: Well left pleural effusion, unchanged.    < end of copied text >

## 2020-07-03 NOTE — CONSULT NOTE ADULT - ASSESSMENT
55f with metastatic breast ca s/p cytoxan 6/30, transaminitis  admitted with fever and chills  found to have fever, tachycardia, hypotension   sepsis v sirs

## 2020-07-04 NOTE — DIETITIAN INITIAL EVALUATION ADULT. - SIGNS/SYMPTOMS
pt meeting < 75% of estimated needs >/= 1 month, ascites likely masking weight loss pt meeting < 75% of estimated needs >/= 1 month, ascites masking fluid loss

## 2020-07-04 NOTE — PROGRESS NOTE ADULT - ASSESSMENT
55f with metastatic breast ca s/p cytoxan 6/30, transaminitis  admitted with fever and chills  found to have fever, tachycardia, hypotension   sepsis v sirs
55F with PMH of breast Ca (dx 2005 with mets to liver, lung, bone, last dose cytoxan on 6/30), GERD, and bilateral hip surgeries, presenting with fevers and generalized weakness x 1 day, found to have severe sepsis unclear source
55F with PMH of breast Ca (dx 2005 with mets to liver, lung, bone, last dose cytoxan on 6/30), GERD, and bilateral hip surgeries, presenting with fevers and generalized weakness x 1 day, found to have severe sepsis unclear source

## 2020-07-04 NOTE — DIETITIAN INITIAL EVALUATION ADULT. - OTHER INFO
Pertinent Information: This is a 55F with PMH of breast Ca (dx 2005 with mets to liver, lung, bone, last dose cytoxan on 6/30), GERD, and bilateral hip surgeries, presenting with fevers and generalized weakness x 1 day, found to have severe sepsis unclear source.     Pt reports fair PO intake and appetite, states intake has been effected by feelings of fullness from ascites and nausea as a result of chemotherapy (last on 6/30). Reports she typically consumes 2 meals at home, breakfast is usually 1 yogurt, Lunch/dinner varies (protein, carb, vegetable dish), snacks on fruit. Reports friends have been preparing cooked meals for her 2x per week, states food usually last her the entire week since she doesn't eat larger portions. Confirms allergy to raw apples, peaches and pears (cooked ok)--itchy mouth. Denies micronutrient supplementation. Pt denies chewing/swallowing difficulty,  vomiting, diarrhea, constipation PTA.     Weights: pt reports UBW as ~ 155lbs, states it has been relatively stable but does endorse some weight gain due to fluids. Denies any noticeable weight changes in face. Weight per previous RD was 160lbs (5/28/20), current dosing weight is  168.7lbs (7/2/20).     Since admission pt reports appetite and PO intake has been fair to poor due to nausea (receiving anti-emetic) . Pt consuming cereal for breakfast during visit. No diarrhea, constipation reported, last BM 7/3. Discussed with patient importance of PO intake and prioritizing sources of protein to maintain lean body mass. Pt encouraged to consume non-perishable food items in between meals to mimic smaller more frequent meals. Pt reports trial of nutrition supplements int he past however hasn't care for them, amenable to trying Mighty Shakes to see if more palatable (prefer chocolate flavors). Obtained additional food preferences, will honor as able.  Patient with no nutrition-related questions at this time. Made aware RD remains available as needed.

## 2020-07-04 NOTE — DIETITIAN INITIAL EVALUATION ADULT. - ETIOLOGY
related to inadequate protein-energy intake in setting of metastatic cancer related to inadequate protein-energy intake in setting of metastatic cancer, ascites

## 2020-07-04 NOTE — PROGRESS NOTE ADULT - PROBLEM SELECTOR PLAN 4
Hx of metastatic breast cancer dx in 2015 prior chemo/RT with known mets to brain, liver, and lung, last chemo cytoxan on 6/30  - no neutropenia at this time  - holding chemo  - per outpt onc, cancer is advance and are planning to have GOC with her  - prn zofran for nausea
Hx of metastatic breast cancer dx in 2015 prior chemo/RT with known mets to brain, liver, and lung, last chemo cytoxan on 6/30  - no neutropenia at this time  - holding chemo  - per outpt onc, cancer is advance and are planning to have GOC with her  - prn zofran for nausea

## 2020-07-04 NOTE — PROGRESS NOTE ADULT - PROBLEM SELECTOR PLAN 1
pt not neutropenic  blood and urine cx neg to date  abd is benign and less distended  no s/s of pna  and diarrhea is improved  will d/c vancomycin today  and with continued improvement will d/c zosyn tomorrow.
Patient currently meeting criteria for severe sepsis with fever, tachycardia, and lactic acidosis soft BPs however unclear source. Recent admission last month strep bacteremia 2/2 L proximal LE cellulitis. DDX: line infection, viral URI, SBP  - Exam with no skin rash, meningismus, or overt exterior line infection  - procal 0.45, cxr neg  - CTAP with no overt source of infection; UA bland  - COVID 19 neg, f/u RVP  - s/p 1.5L in ER, hold further fluids given third spacing  midodrine 20 TID (previously on at home) and albumin  - tylenol prn for fever  - ID consult appreciated
Patient currently meeting criteria for severe sepsis with fever, tachycardia, and lactic acidosis soft BPs however unclear source. Recent admission last month strep bacteremia 2/2 L proximal LE cellulitis. DDX: line infection, viral URI, SBP  - Exam with no skin rash, meningismus, or overt exterior line infection  - procal 0.45, cxr neg  - CTAP with no overt source of infection; UA bland  - COVID 19 neg, f/u RVP  - s/p 1.5L in ER, hold further fluids given third spacing  - start midodrine 20 TID (previously on at home) and albumin  - tylenol prn for fever  - monitor diarrhea, consider cdiff   - address ascites as below  - monitor closely for hemodynamic deterioration, low threshold for MICU eval  - ID consult appreciated

## 2020-07-04 NOTE — CHART NOTE - NSCHARTNOTEFT_GEN_A_CORE
Spoke with IR on call who evaluated CT abd, pt has minimal to moderate ascities , as of now plan is for non-emergent paracentesis on monday, will get abd US to further evaluate, D/W Dr Manrique
Upon Nutritional Assessment by the Registered Dietitian your patient was determined to meet criteria / has evidence of the following diagnosis/diagnoses:          [ x]  Mild Protein Calorie Malnutrition        Findings as based on:  [ ] Comprehensive nutrition assessment   [ ] Nutrition Focused Physical Exam  [x ] Other: pt meeting < 75% of estimated needs >/= 1 month, ascites masking fluid loss.      Nutrition Plan/Recommendations:    see initial nutrition assessment in documents for details     RD to remain available and follow-up as medically appropriate.       PROVIDER Section:     By signing this assessment you are acknowledging and agree with the diagnosis/diagnoses assigned by the Registered Dietitian    Comments:
Vancomycin trough pre 4th dose : 8.5, per ID note should be 15-20. Vancomycin dose adjusted from 1g q 12 -- > 1.25g q 12, with another pre 4th dose vanco trough ordered. RN aware.     JUSTIN DiasC  Department of Medicine

## 2020-07-04 NOTE — DIETITIAN INITIAL EVALUATION ADULT. - ADD RECOMMEND
1) Continue current diet. Encourage PO intake of protein-rich, nutrient dense foods as tolerated.  2) RD to add Mighty Shakes TID to supplement PO intake 3) Obtain, honor food preferences. 4) Malnutrition alert placed in EMR, provider notified.

## 2020-07-04 NOTE — PROGRESS NOTE ADULT - PROBLEM SELECTOR PLAN 8
Transitions of Care Status:  1.  Name of PCP: Dr. Fletcher  2.  PCP Contacted on Admission: [ ] Y    [x ] N    3.  PCP contacted at Discharge: [ ] Y    [ ] N    [ ] N/A  4.  Post-Discharge Appointment Date and Location:  5.  Summary of Handoff given to PCP:
Transitions of Care Status:  1.  Name of PCP: Dr. Fletcher  2.  PCP Contacted on Admission: [ ] Y    [x ] N    3.  PCP contacted at Discharge: [ ] Y    [ ] N    [ ] N/A  4.  Post-Discharge Appointment Date and Location:  5.  Summary of Handoff given to PCP:

## 2020-07-04 NOTE — PROGRESS NOTE ADULT - SUBJECTIVE AND OBJECTIVE BOX
[facility], Division of Infectious Diseases  LIZET Eldridge  [phone]    Name: WHITLEY SILVA  Age: 55y  Gender: Female  MRN: [id number]    Interval History--  Notes reviewed  pt no new complaints  she feels much better today     Past Medical History--  H/O pleural effusion  Breast Cancer h/o chemo and radiation in 2005  HPV (Human Papillomavirus) in 2004  GERD (Gastroesophageal Reflux Disease)  H/O: Osteoarthritis  Congenital Hip Dysplasia  Hyperlipidemia - allergic to all statin drugs - on no meds  H/O pleural effusion  Cryo ablation of cervix in 2006  h/o  Breast Lumpectomyn - left 2005  Breast Cancer - chemoport insertion  and removal 2005 and 2006  H/O: Myomectomy 2003  History of Total Hip Replacement left 2003      For details regarding the patient's social history, family history, and other miscellaneous elements, please refer the initial infectious diseases consultation and/or the admitting history and physical examination for this admission.    Allergies    Crestor (Joint Pain; Other)  HMG-CoA reductase inhibitors (Unknown)  Lipitor (Rash)  statins (Joint Pain; Rash)  Zocor (Joint Pain; Other)    Intolerances        Medications--  Antibiotics:  piperacillin/tazobactam IVPB.. 3.375 Gram(s) IV Intermittent every 8 hours  vancomycin  IVPB 1250 milliGRAM(s) IV Intermittent every 12 hours    Immunologic:    Other:  acetaminophen   Tablet .. PRN  lactobacillus acidophilus  midodrine  ondansetron Injectable PRN  simethicone      Review of Systems--  A 10-point review of systems was obtained.     Pertinent positives and negatives--  Constitutional: No fevers. No Chills. No Rigors.   Cardiovascular: No chest pain. No palpitations.  Respiratory: No shortness of breath. No cough.  Gastrointestinal: No nausea or vomiting. No diarrhea or constipation.   Psychiatric: + anxiety     Review of systems otherwise negative except as previously noted.    Physical Examination--  Vital Signs: T(F): 99 (07-04-20 @ 16:28), Max: 99 (07-04-20 @ 16:28)  HR: 72 (07-04-20 @ 16:28)  BP: 113/77 (07-04-20 @ 16:28)  RR: 18 (07-04-20 @ 16:28)  SpO2: 100% (07-04-20 @ 16:28)  Wt(kg): --  General: Nontoxic-appearing Female in no acute distress.  HEENT: AT/NC. . Anicteric.   Neck: Not rigid. No sense of mass.  Nodes: None palpable.  Lungs: Clear bilaterally without rales, wheezing or rhonchi  Heart: Regular rate and rhythm. No Murmur. + port site clean  Abdomen: Bowel sounds present and normoactive. Soft. distended. Nontender.  Back: No spinal tenderness. No costovertebral angle tenderness.   Extremities: No cyanosis or clubbing. + edema.   Skin: Warm. Dry. Good turgor. No rash. No vasculitic stigmata.  Psychiatric: Appropriate affect and mood for situation.         Laboratory Studies--  CBC                        9.5    8.06  )-----------( 148      ( 03 Jul 2020 07:24 )             31.3       Chemistries  07-03    140  |  105  |  6<L>  ----------------------------<  86  3.5   |  25  |  0.55    Ca    7.4<L>      03 Jul 2020 07:23  Phos  2.5     07-03  Mg     1.8     07-03    TPro  5.4<L>  /  Alb  2.9<L>  /  TBili  2.0<H>  /  DBili  x   /  AST  250<H>  /  ALT  42  /  AlkPhos  212<H>  07-03      Culture Data    Culture - Urine (collected 02 Jul 2020 15:12)  Source: .Urine Clean Catch (Midstream)  Final Report (03 Jul 2020 10:01):    <10,000 CFU/mL Normal Urogenital Starr    Culture - Blood (collected 02 Jul 2020 14:57)  Source: .Blood Blood-Peripheral  Preliminary Report (03 Jul 2020 15:01):    No growth to date.    Culture - Blood (collected 02 Jul 2020 14:57)  Source: .Blood Blood-Peripheral  Preliminary Report (03 Jul 2020 15:01):    No growth to date.        < from: US Abdomen Limited (07.03.20 @ 16:15) >    EXAM:  US ABDOMEN LIMITED                            PROCEDURE DATE:  07/03/2020            INTERPRETATION:  CLINICAL INFORMATION: Breast cancer. Evaluate for ascites.    COMPARISON: Abdominal CT dated 07/02/2020.    TECHNIQUE: Limited Sonography of the abdomen.    IMPRESSION:    Small amount of ascites is noted.    < end of copied text >
Patient is a 55y old  Female who presents with a chief complaint of fever, weakness (2020 13:30)      INTERVAL HPI/OVERNIGHT EVENTS: noted  pt seen and examined  c/o abd discomfort, difficulty breathing d/t distended abd  denies n/v/urinary symtoms/no change in bowelhabits      Vital Signs Last 24 Hrs  T(C): 37 (2020 15:37), Max: 37.1 (2020 11:10)  T(F): 98.6 (2020 15:37), Max: 98.8 (2020 11:10)  HR: 71 (2020 15:37) (71 - 990)  BP: 114/71 (2020 15:37) (101/65 - 125/82)  BP(mean): --  RR: 20 (2020 15:37) (20 - 22)  SpO2: 99% (2020 15:37) (98% - 100%)    acetaminophen   Tablet .. 975 milliGRAM(s) Oral every 8 hours PRN  lactobacillus acidophilus 1 Tablet(s) Oral daily  midodrine 20 milliGRAM(s) Oral every 8 hours  ondansetron Injectable 4 milliGRAM(s) IV Push every 6 hours PRN  piperacillin/tazobactam IVPB.. 3.375 Gram(s) IV Intermittent every 8 hours  simethicone 80 milliGRAM(s) Chew daily  vancomycin  IVPB 1000 milliGRAM(s) IV Intermittent every 12 hours      PHYSICAL EXAM:  GENERAL: NAD,   EYES: conjunctiva and sclera clear  ENMT: Moist mucous membranes  NECK: Supple, No JVD, Normal thyroid  CHEST/LUNG: non labored, cta b/l  HEART: Regular rate and rhythm; No murmurs, rubs, or gallops  ABDOMEN: Soft, tender, distended; Bowel sounds present  EXTREMITIES:  2+ Peripheral Pulses, No clubbing, cyanosis, or edema  LYMPH: No lymphadenopathy noted  SKIN: No rashes or lesions    Consultant(s) Notes Reviewed:  [x ] YES  [ ] NO  Care Discussed with Consultants/Other Providers [ x] YES  [ ] NO    LABS:                        9.5    8.06  )-----------( 148      ( 2020 07:24 )             31.3     07-03    140  |  105  |  6<L>  ----------------------------<  86  3.5   |  25  |  0.55    Ca    7.4<L>      2020 07:23  Phos  2.5     07-  Mg     1.8     07-    TPro  5.4<L>  /  Alb  2.9<L>  /  TBili  2.0<H>  /  DBili  x   /  AST  250<H>  /  ALT  42  /  AlkPhos  212<H>  07-03    PT/INR - ( 2020 08:47 )   PT: 15.7 sec;   INR: 1.35 ratio         PTT - ( 2020 08:47 )  PTT:30.2 sec  Urinalysis Basic - ( 2020 12:17 )    Color: Yellow / Appearance: Clear / S.015 / pH: x  Gluc: x / Ketone: Negative  / Bili: Negative / Urobili: Negative   Blood: x / Protein: Trace / Nitrite: Negative   Leuk Esterase: Negative / RBC: 7 /hpf / WBC 3 /HPF   Sq Epi: x / Non Sq Epi: 9 /hpf / Bacteria: Negative      CAPILLARY BLOOD GLUCOSE            Urinalysis Basic - ( 2020 12:17 )    Color: Yellow / Appearance: Clear / S.015 / pH: x  Gluc: x / Ketone: Negative  / Bili: Negative / Urobili: Negative   Blood: x / Protein: Trace / Nitrite: Negative   Leuk Esterase: Negative / RBC: 7 /hpf / WBC 3 /HPF   Sq Epi: x / Non Sq Epi: 9 /hpf / Bacteria: Negative        Culture - Urine (collected 2020 15:12)  Source: .Urine Clean Catch (Midstream)  Final Report (2020 10:01):    <10,000 CFU/mL Normal Urogenital Starr    Culture - Blood (collected 2020 14:57)  Source: .Blood Blood-Peripheral  Preliminary Report (2020 15:01):    No growth to date.    Culture - Blood (collected 2020 14:57)  Source: .Blood Blood-Peripheral  Preliminary Report (2020 15:01):    No growth to date.        RADIOLOGY & ADDITIONAL TESTS:    Imaging Personally Reviewed:  [x ] YES  [ ] NO
Patient is a 55y old  Female who presents with a chief complaint of fever, weakness (04 Jul 2020 16:33)      INTERVAL HPI/OVERNIGHT EVENTS: noted  pt seen and examined  improved breathing  tolerating po  denies abd pain/n/v      Vital Signs Last 24 Hrs  T(C): 37.2 (04 Jul 2020 16:28), Max: 37.2 (04 Jul 2020 00:37)  T(F): 99 (04 Jul 2020 16:28), Max: 99 (04 Jul 2020 16:28)  HR: 72 (04 Jul 2020 16:28) (64 - 82)  BP: 113/77 (04 Jul 2020 16:28) (97/60 - 127/74)  BP(mean): --  RR: 18 (04 Jul 2020 16:28) (18 - 19)  SpO2: 100% (04 Jul 2020 16:28) (98% - 100%)    acetaminophen   Tablet .. 975 milliGRAM(s) Oral every 8 hours PRN  lactobacillus acidophilus 1 Tablet(s) Oral daily  midodrine 20 milliGRAM(s) Oral every 8 hours  ondansetron Injectable 4 milliGRAM(s) IV Push every 6 hours PRN  piperacillin/tazobactam IVPB.. 3.375 Gram(s) IV Intermittent every 8 hours  simethicone 80 milliGRAM(s) Chew daily      PHYSICAL EXAM:  GENERAL: NAD,   EYES: conjunctiva and sclera clear  ENMT: Moist mucous membranes  NECK: Supple, No JVD, Normal thyroid  CHEST/LUNG: non labored, cta b/l  HEART: Regular rate and rhythm; No murmurs, rubs, or gallops  ABDOMEN: Soft, Nontender, Nondistended; Bowel sounds present  EXTREMITIES:  2+ Peripheral Pulses, No clubbing, cyanosis, or edema  LYMPH: No lymphadenopathy noted  SKIN: No rashes or lesions    Consultant(s) Notes Reviewed:  [x ] YES  [ ] NO  Care Discussed with Consultants/Other Providers [ x] YES  [ ] NO    LABS:                        9.5    8.06  )-----------( 148      ( 03 Jul 2020 07:24 )             31.3     07-03    140  |  105  |  6<L>  ----------------------------<  86  3.5   |  25  |  0.55    Ca    7.4<L>      03 Jul 2020 07:23  Phos  2.5     07-03  Mg     1.8     07-03    TPro  5.4<L>  /  Alb  2.9<L>  /  TBili  2.0<H>  /  DBili  x   /  AST  250<H>  /  ALT  42  /  AlkPhos  212<H>  07-03    PT/INR - ( 03 Jul 2020 08:47 )   PT: 15.7 sec;   INR: 1.35 ratio         PTT - ( 03 Jul 2020 08:47 )  PTT:30.2 sec    CAPILLARY BLOOD GLUCOSE                Culture - Urine (collected 02 Jul 2020 15:12)  Source: .Urine Clean Catch (Midstream)  Final Report (03 Jul 2020 10:01):    <10,000 CFU/mL Normal Urogenital Starr    Culture - Blood (collected 02 Jul 2020 14:57)  Source: .Blood Blood-Peripheral  Preliminary Report (03 Jul 2020 15:01):    No growth to date.    Culture - Blood (collected 02 Jul 2020 14:57)  Source: .Blood Blood-Peripheral  Preliminary Report (03 Jul 2020 15:01):    No growth to date.        RADIOLOGY & ADDITIONAL TESTS:    Imaging Personally Reviewed:  [x ] YES  [ ] NO

## 2020-07-04 NOTE — PROGRESS NOTE ADULT - PROBLEM SELECTOR PLAN 6
In setting of underlying malignancy poor appetite  - nutrition consult
In setting of underlying malignancy poor appetite  - nutrition consult

## 2020-07-04 NOTE — PROGRESS NOTE ADULT - ATTENDING COMMENTS
pt wendy    [facility]  
[facility]  [phone]

## 2020-07-04 NOTE — PROGRESS NOTE ADULT - PROBLEM SELECTOR PLAN 3
Worsening liver enzymes with AST/ALT in 300s (previosly AST in mid 100s and ALP in mid 200s) and Tb 1.5. Likely due to recent cytoxan exposure as well underlying bulky mets and fatty liver disease due to unaddressed HLD 2/2 statin intolerance  - INR wnl, continue to trend  - CTAP without overt biliary obstruction  - if worsening, consider hepatology evaluation
Worsening liver enzymes with AST/ALT in 300s (previosly AST in mid 100s and ALP in mid 200s) and Tb 1.5. Likely due to recent cytoxan exposure as well underlying bulky mets and fatty liver disease due to unaddressed HLD 2/2 statin intolerance  - INR wnl, continue to trend  - CTAP without overt biliary obstruction  - if worsening, consider hepatology evaluation

## 2020-07-04 NOTE — PROGRESS NOTE ADULT - PROBLEM SELECTOR PLAN 2
Worsening ascites noted on CTAP per patient was noted on PET-CT last week but no windows for tap. Currently with marked distention, likely after receiving fluids with moderate respiratory discomfort  - IR consult  for diagnostic as well therapeutic tap- plan for procedure on MOnday  - avoid fluids given hypoalbuminemia
Worsening ascites noted on CTAP per patient was noted on PET-CT last week but no windows for tap. Currently with marked distention, likely after receiving fluids with moderate respiratory discomfort  - IR consult  for diagnostic as well therapeutic tap- given non urgency based on ct-moderate ascites, plan for procedure on MOnday  - avoid fluids given hypoalbuminemia

## 2020-07-04 NOTE — DIETITIAN INITIAL EVALUATION ADULT. - PHYSICAL APPEARANCE
overweight/other (specify) Ht: 61 inches, Wt: 168.7lbs, BMI: 32kg/m2, IBW: 105lbs +/- 10%, %IBW: 161%  Edema: none, noted with ascites, Skin: free of pressure injuries per nursing flow sheets    Full Nutrition focused physical exam deferred at this time per pt preference as pt feeling nauseous, lying down, no overt signs of muscle/fat depletion noted.

## 2020-07-05 NOTE — DISCHARGE NOTE PROVIDER - CARE PROVIDER_API CALL
Flavia Fang  INTERNAL MEDICINE  [address] [place] [address]  Phone: [phone]  Fax: [phone])-  Follow Up Time:     Ashlyn Perez  INFECTIOUS DISEASE  [address] [place] [id number]  Phone: [phone]  Fax: [phone]  Follow Up Time:

## 2020-07-05 NOTE — DISCHARGE NOTE PROVIDER - NSDCFUADDAPPT_GEN_ALL_CORE_FT
Please call and make a follow up appointment with your PCP upon discharge from hospital.    Please call and make follow up appointment with your Oncologist upon discharge from hospital.

## 2020-07-05 NOTE — PHYSICAL THERAPY INITIAL EVALUATION ADULT - ADDITIONAL COMMENTS
Pt lives w/ her brother in a pvt home, ramp to enter, 1 flight of stairs to the bedroom. PTA pt reports being independent w/ mobility & has a RW she utilizes for further distances. 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 Other DME includes shower chair

## 2020-07-05 NOTE — DISCHARGE NOTE PROVIDER - NSDCMRMEDTOKEN_GEN_ALL_CORE_FT
Ativan 0.5 mg oral tablet: 1 tab(s) orally once a day (at bedtime)  Imodium 2 mg oral capsule: 1 cap(s) orally every 4 hours, As Needed diarrhea after chemo  loratadine 10 mg oral capsule: 1 cap(s) orally once a day  omeprazole 40 mg oral delayed release capsule: 1 cap(s) orally once a day  simethicone 80 mg oral tablet, chewable: 2 tab(s) orally 2 times a day  Tylenol 325 mg oral capsule: 1 cap(s) orally 3 times a day, As Needed  Zofran 4 mg oral tablet: 1 tab(s) orally every 8 hours, As Needed nausea/vomiting

## 2020-07-05 NOTE — PHYSICAL THERAPY INITIAL EVALUATION ADULT - PERTINENT HX OF CURRENT PROBLEM, REHAB EVAL
55 y.o. F PMH breast Ca (dx 2005 with mets to liver, lung, bone), GERD, & b/l hip surgeries, p/w fevers & generalized weakness x 1 day. Pt endorses that she had cytoxan on 6/30 after which she has felt weakness. She has baseline chronic cough & CONTE, since end of fall due to complex pleural effusion previously warranting pleurX which was removed in 12/2019. She has also noted worsening abdominal distention had a PET scan last week was told there was more ascites

## 2020-07-05 NOTE — DISCHARGE NOTE PROVIDER - NSDCCPCAREPLAN_GEN_ALL_CORE_FT
PRINCIPAL DISCHARGE DIAGNOSIS  Diagnosis: Sepsis  Assessment and Plan of Treatment: PRINCIPAL DISCHARGE DIAGNOSIS  Diagnosis: Sepsis  Assessment and Plan of Treatment: blood and urine cx neg to date  abd is benign and less distended  no s/s of pna  and diarrhea is improved  d/c vancomycin and zosyn

## 2020-07-05 NOTE — DISCHARGE NOTE NURSING/CASE MANAGEMENT/SOCIAL WORK - PATIENT PORTAL LINK FT
You can access the FollowMyHealth Patient Portal offered by [facility] by registering at the following website: [email or web address]. By joining [employer]’s FollowMyHealth portal, you will also be able to view your health information using other applications (apps) compatible with our system.

## 2020-07-05 NOTE — DISCHARGE NOTE PROVIDER - HOSPITAL COURSE
55F with PMH of breast Ca (dx 2005 with mets to liver, lung, bone, last dose cytoxan on 6/30), GERD, and bilateral hip surgeries,presenting with fevers and generalized weakness x 1 day. Patient endorses that she had cytoxan on 6/30 (no growth factors) after which she has felt weakness. Patient denies headache, abdominal pain, oral pain, vomiting, dysuria, or new rashes. DEnies travel or sick contacts. She usually has transient NBNB diarrhea as well as nausea after chemo, which she has not noted today.  She has had baseline chronic cough and dyspnea on exertion, since end of fall due to complex pleural effusion previously warranting pleurX which was removed in 12/2019. She has also noted worsening abdominal distention had a PET scan last week was told there was more ascites but no window for therapeutic tap.         Of note, patient had a recent admission for septic shock 2/2 strep bacteremia 2/2 L proximal LE cellulitis 5/28-6/4 for which was empirically treated with penicillins and discharge on amoxicillin that was completed on 6/10. In addition, patient was on pressors while inpatient, discharged on midodrine 20 TID, which she weaned off checking home BPS and cleared by cardiology during appt on 6/10.         In ED, Tmax 102.5, , SBP initially 130s, and 95% on RA. Labs notable for mild leukocytisis 10 with mild PMN predominance (previously 6 prior to chemotherapy) and lactate 3.0 --> 2.5. She was given Vanco/Zosyn, 1.5L fluids, tylenol x 1, zofran x 1. 55F with PMH of breast Ca (dx 2005 with mets to liver, lung, bone, last dose cytoxan on 6/30), GERD, and bilateral hip surgeries,presenting with fevers and generalized weakness x 1 day. Patient endorses that she had cytoxan on 6/30 (no growth factors) after which she has felt weakness. Patient denies headache, abdominal pain, oral pain, vomiting, dysuria, or new rashes. DEnies travel or sick contacts. She usually has transient NBNB diarrhea as well as nausea after chemo, which she has not noted today.  She has had baseline chronic cough and dyspnea on exertion, since end of fall due to complex pleural effusion previously warranting pleurX which was removed in 12/2019. She has also noted worsening abdominal distention had a PET scan last week was told there was more ascites but no window for therapeutic tap.         Of note, patient had a recent admission for septic shock 2/2 strep bacteremia 2/2 L proximal LE cellulitis 5/28-6/4 for which was empirically treated with penicillins and discharge on amoxicillin that was completed on 6/10. In addition, patient was on pressors while inpatient, discharged on midodrine 20 TID, which she weaned off checking home BPS and cleared by cardiology during appt on 6/10.         In ED, Tmax 102.5, , SBP initially 130s, and 95% on RA. Labs notable for mild leukocytisis 10 with mild PMN predominance (previously 6 prior to chemotherapy) and lactate 3.0 --> 2.5. She was given Vanco/Zosyn, 1.5L fluids, tylenol x 1, zofran x 1.                blood and urine cx neg to date    abd is benign and less distended    no s/s of pna    and diarrhea is improved    d/c vancomycin and zosyn 55F with PMH of breast Ca (dx 2005 with mets to liver, lung, bone, last dose cytoxan on 6/30), GERD, and bilateral hip surgeries,presenting with fevers and generalized weakness x 1 day. Patient endorses that she had cytoxan on 6/30 (no growth factors) after which she has felt weakness. Patient denies headache, abdominal pain, oral pain, vomiting, dysuria, or new rashes. DEnies travel or sick contacts. She usually has transient NBNB diarrhea as well as nausea after chemo, which she has not noted today.  She has had baseline chronic cough and dyspnea on exertion, since end of fall due to complex pleural effusion previously warranting pleurX which was removed in 12/2019. She has also noted worsening abdominal distention had a PET scan last week was told there was more ascites but no window for therapeutic tap.         Of note, patient had a recent admission for septic shock 2/2 strep bacteremia 2/2 L proximal LE cellulitis 5/28-6/4 for which was empirically treated with penicillins and discharge on amoxicillin that was completed on 6/10. In addition, patient was on pressors while inpatient, discharged on midodrine 20 TID, which she weaned off checking home BPS and cleared by cardiology during appt on 6/10.         In ED, Tmax 102.5, , SBP initially 130s, and 95% on RA. Labs notable for mild leukocytisis 10 with mild PMN predominance (previously 6 prior to chemotherapy) and lactate 3.0 --> 2.5. She was given Vanco/Zosyn, 1.5L fluids, tylenol x 1, zofran x 1.        blood and urine cx neg to date    abd is benign and less distended    no s/s of pna    and diarrhea is improved    d/c vancomycin and zosyn

## 2020-08-29 NOTE — ED PROVIDER NOTE - OBJECTIVE STATEMENT
56yo female pt with PMHx of Breast Ca (dx 2005 with mets to liver, lung, bone, last dose Cytoxan was 3 weeks ago), GERD, was brought to ED by her brother c/o confusion. Pt's is A&Ox2 and poor historian. As per her brother, he noticed pt's confused this morning and has not answered questions properly. She is also on high dose of opioids. Unknown fever, cough or N/V/D. Pt denies CP/abd pain or urinary problems.

## 2020-08-29 NOTE — ED ADULT NURSE NOTE - NSIMPLEMENTINTERV_GEN_ALL_ED
Implemented All Fall Risk Interventions:  [place] to call system. Call bell, personal items and telephone within reach. Instruct patient to call for assistance. Room bathroom lighting operational. Non-slip footwear when patient is off stretcher. Physically safe environment: no spills, clutter or unnecessary equipment. Stretcher in lowest position, wheels locked, appropriate side rails in place. Provide visual cue, wrist band, yellow gown, etc. Monitor gait and stability. Monitor for mental status changes and reorient to person, place, and time. Review medications for side effects contributing to fall risk. Reinforce activity limits and safety measures with patient and family.

## 2020-08-29 NOTE — ED PROVIDER NOTE - CLINICAL SUMMARY MEDICAL DECISION MAKING FREE TEXT BOX
55yr F hx of breast CA metastatic to bones on chemo last dose 3wks prior, on low dose prednisone, with confusion and somnolence for unknown amount of time. reports recently taking higher dose of dilaudid. unsure if febrile, denies cp, headache, cough, rhinorrhea, vomiting, diarrhea, or urinary sx. denies recent antibiotics.   exam notable for pt easily arousable but eyes closed at baseline, aaox 2, moves all 4,and tachycardic,SPO2 100% on RA. not hypotensive. decr lung sounds on left specially base, S1-2,   skin noted to have rt arm wound w bandage, fent patches was removed, rt ankle swelling and dried blood from small wound on lat mal. diffusely tender abd and distended.   concern for sepsis vs med overdose vs CNS pathology. will do labs, CT head abd pelvis, ua cxr, covid and IV bolus. likely admit. empiric AB if neutropenic or hypotensive, and may consider stress steroids.

## 2020-08-29 NOTE — ED ADULT TRIAGE NOTE - CHIEF COMPLAINT QUOTE
As per Pt brother rosy he went to house this morning at 1030 Am when he first saw her and "she wasn't making sense". Pt disoriented, unsure of month/year/why she's here, some sentences don't make sense and are trailing. Pt recently had increase in pain medications, pt has metastatic lung CA to bone, cytoxan last treatment 3 weeks. Pt on duragesic 50 and Dilaudid.

## 2020-08-29 NOTE — ED ADULT NURSE REASSESSMENT NOTE - NS ED NURSE REASSESS COMMENT FT1
[place]ed pharmacy [place] [phone] regarding Acyclovir. Pharmacist said tube stations are not currently working and that pharmacist from main pharmacy will have to walk down ABX.

## 2020-08-29 NOTE — ED PROVIDER NOTE - PHYSICAL EXAMINATION
NAD, A&Ox2, Tachycardia, Afebrile, Anemic conjunctiva, Neck supple. Mild diminished left lung sounds. + RUQ tender. + Move all extremities. NAD, A&Ox2, Tachycardia, Afebrile, Anemic conjunctiva, Neck supple. Mild diminished left lung sounds. + Distended abdomen with  RUQ tender. + Move all extremities. NAD, A&Ox2, Tachycardia, Afebrile, Anemic conjunctiva, Neck supple. Mild diminished left lung sounds. + Distended abdomen with  RUQ tender. B/L +1 pitting edema, + Move all extremities. NAD, A&Ox2, Tachycardia, Afebrile, Anemic conjunctiva, Neck supple. Mild diminished left lung sounds. + Distended abdomen with  RUQ tender. B/L +1 pitting edema, + Superficial abrasion wound on lat mal ankle area without bleeding or tender. + Move all extremities.

## 2020-08-29 NOTE — ED PROVIDER NOTE - PROGRESS NOTE DETAILS
lact 4.2, given AMS , will treat as sepsis, however given presence of significant ascites, will do antibiotics with judicious IV boluses. DJ

## 2020-08-29 NOTE — ED ADULT NURSE REASSESSMENT NOTE - NS ED NURSE REASSESS COMMENT FT1
straight catheter procedure performed. sterile technique maintained. patient tolerated well. 100 cc yellow urine noted. sent to lab as ordered.

## 2020-08-29 NOTE — ED ADULT NURSE NOTE - OBJECTIVE STATEMENT
56 yo F with PMH breast CA metastatic to bones on chemo last dose 3wks ago (chemo Q 3 weeks), on low dose prednisone presents to the ED from home with AMS. as per brother, patient was "normal" Friday. he states when he saw her today she seemed for confused and lethargic. patient arrives to the ED, lethargic, AAOx1, disorientated to time and place. PERRL. speech clear. moving all extremities with equal strength and sensation. lung sounds CTA bilaterally. cap refill <3sec. +2radial pulses noted. abdomen is soft, distended, non-tender. skin intact. patient is slightly jaundiced in color. denies chest pain, SOB ,fevers, chills, N/V/D, HA, dizziness, cough, abdominal pain, back pain, dysuria, numbness or tingling. sinus tachycardic on monitor. NP at the bedside. power port to left chest wall. last accessed 3 weeks ago for chemo as per patient.

## 2020-08-30 PROBLEM — Z87.09 PERSONAL HISTORY OF OTHER DISEASES OF THE RESPIRATORY SYSTEM: Chronic | Status: ACTIVE | Noted: 2019-01-01

## 2020-08-30 NOTE — H&P ADULT - NSHPPHYSICALEXAM_GEN_ALL_CORE
Vital Signs Last 24 Hrs  T(C): 36.7 (08-30-20 @ 01:38), Max: 36.9 (08-29-20 @ 18:07)  T(F): 98 (08-30-20 @ 01:38), Max: 98.5 (08-29-20 @ 18:07)  HR: 98 (08-30-20 @ 01:38) (93 - 112)  BP: 132/84 (08-30-20 @ 01:38) (122/78 - 144/76)  BP(mean): --  RR: 18 (08-30-20 @ 01:38) (18 - 24)  SpO2: 94% (08-30-20 @ 01:38) (93% - 100%)

## 2020-08-30 NOTE — H&P ADULT - PROBLEM SELECTOR PLAN 3
Pt with tachycardia and leukopenia meeting sepsis criteria. No obvious source at this point. Could be SBP but pt's abd exam seems relatively benign currently.  -Cont. empiric IV antibiotics for now  -Trend lactate  -Cont. gentle IVF  -F/u BCX and UCx

## 2020-08-30 NOTE — H&P ADULT - ASSESSMENT
54yo female pt with PMHx of Breast Ca (dx 2005 with mets to liver, lung, bone, last dose Cytoxan was 3 weeks ago), GERD p/w acute encephalopathy as well as SIRS. Unclear if encephalopathy more sepsis driven or opiate driven, likely multifactorial.

## 2020-08-30 NOTE — CHART NOTE - NSCHARTNOTEFT_GEN_A_CORE
ISTOP reviewed Reference #: [id number]  Rx Written	Rx Dispensed	Drug	Quantity	Days Supply	Prescriber Name	Payment Method	Dispenser  08/26/2020	08/27/2020	lorazepam 1 mg tablet	60	20	ZhaneCherie RPA-C	Insurance	[facility] #[id number]  08/26/2020	08/27/2020	hydromorphone 4 mg tablet	100	16	Zhane, Cherie RPA-C	Insurance	[facility] #[id number]  08/14/2020	08/14/2020	fentanyl 25 mcg/hr patch	10	30	Zhane, Cherie RPA-C	Insurance	[facility] #[id number]  07/30/2020	07/31/2020	hydromorphone 2 mg tablet	143	24	Zhane, Cherie RPA-C	Insurance	[facility] #[id number]  07/21/2020	07/21/2020	hydromorphone 2 mg tablet	42	7	Zhane, Cherie RPA-C	Insurance	[facility] #[id number]  07/21/2020	07/21/2020	fentanyl 12 mcg/hr patch	10	30	Zhane, Cherie RPA-C	Insurance	[facility] #[id number]

## 2020-08-30 NOTE — H&P ADULT - ATTENDING COMMENTS
I was asked to see this patient by the hospitalist in charge. [facility] to assume care for patient in AM and thereafter.

## 2020-08-30 NOTE — H&P ADULT - PROBLEM SELECTOR PLAN 1
Seemingly partially sepsis related at this point although pt did double her dose of dilaudid 2 days ago which is likely contributing. Pt is currently AAOx3 and responding appropriately and only mildly lethargic.  -Cont. empiric IV antibiotics  -F/u BCx  -Trend lactate and f/u procalcitonin  -Gentle IVF  -Will cont. ativan for insomnia with hold parameter  -Will hold off on ordering dilaudid overnight and reassess in AM

## 2020-08-30 NOTE — H&P ADULT - PROBLEM SELECTOR PLAN 2
Hx of metastatic breast cancer dx in 2015 prior chemo/RT with known mets to brain, liver, and lung. Pt states last chemo was roughly 2 weeks ago? ANC 1650.  -Consider oncology consult in AM  -Discussed GOC with patient and if she had any thoughts on what she would want in the event of a code. Pt states she would like to discuss CPR and intubation with her brother before making a decision. Pt is aware her default status will be full code. Her brother Bruno is her HCP. I spent 30 minutes in face to face time discussing advance care planning.

## 2020-08-30 NOTE — CONSULT NOTE ADULT - SUBJECTIVE AND OBJECTIVE BOX
[facility], Division of Infectious Diseases  LIZET Eldridge  [phone]    WHITLEY SILVA  55y, Female  [id number]    HPI: history per brother at bedside who lives with her  55F PMHx of Breast Ca (dx 2005 with mets to liver, lung, bone, last dose Cytoxan was 3 weeks ago), GERD p/w AMS.   history per brother at bedside, states she was in usual state on health 2 days ago, spoke with her oncology PA on phone that day who told her brother pt had no complaints and was baseline.  no headache, no gu symptoms no diarrhea, --that she complained of  Next day brother states she was acting strange and became scared, she was not making any sense.  happened  overnight  no travel, no outdoor activities  she did not have recent antibx per his knowledge  no known sick contacts.      PMH/PSH--  H/O pleural effusion  Breast Cancer h/o chemo and radiation in 2005  HPV (Human Papillomavirus) in 2004  GERD (Gastroesophageal Reflux Disease)  H/O: Osteoarthritis  Congenital Hip Dysplasia  Hyperlipidemia - allergic to all statin drugs - on no meds  H/O pleural effusion  Cryo ablation of cervix in 2006  h/o  Breast Lumpectomyn - left 2005  Breast Cancer - chemoport insertion  and removal 2005 and 2006  H/O: Myomectomy 2003  History of Total Hip Replacement left 2003      Allergies--statin      Medications--  Antibiotics: acyclovir IVPB      acyclovir IVPB 650 milliGRAM(s) IV Intermittent every 8 hours  piperacillin/tazobactam IVPB.. 3.375 Gram(s) IV Intermittent every 8 hours  vancomycin  IVPB 1000 milliGRAM(s) IV Intermittent every 12 hours    Immunologic:   Other: enoxaparin Injectable  lactated ringers.  LORazepam     Tablet PRN  ondansetron Injectable PRN  pantoprazole    Tablet      Social History--  EtOH: denies ***  Tobacco: denies ***  Drug Use: denies ***    Family/Marital History--  FH: type 2 diabetes  single  lives with brother    Remainder not relevant to clinical concern.    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   unable to obtain     Review of systems otherwise negative except as previously noted.    Physical Exam--  Vital Signs: T(F): 97.6 (08-30-20 @ 12:15), Max: 98.5 (08-29-20 @ 18:07)  HR: 97 (08-30-20 @ 12:15)  BP: 116/82 (08-30-20 @ 12:15)  RR: 18 (08-30-20 @ 12:15)  SpO2: 98% (08-30-20 @ 12:15)  Wt(kg): --  General: Nontoxic-appearing Female in no acute distress.  HEENT: AT/NC. PERRL. EOMI. Anicteric. Conjunctiva pink and moist. Oropharynx clear. Dentition fair.  Neck: Not rigid. No sense of mass.  Nodes: None palpable.  Lungs: Clear bilaterally without rales, wheezing or rhonchi  Heart: Regular rate and rhythm. No Murmur.port site clean  Abdomen: Bowel sounds present and normoactive. Soft. distended. Nontender.   Back: No spinal tenderness. No costovertebral angle tenderness.   Extremities: No cyanosis or clubbing. trace edema.   Skin: Warm. Dry. Good turgor. No rash. No vasculitic stigmata.  Psychiatric: Appropriate affect and mood for situation.         Laboratory & Imaging Data--  CBC                        10.8   3.05  )-----------( 22       ( 30 Aug 2020 05:38 )             33.4       Chemistries  08-30    132<L>  |  95<L>  |  22  ----------------------------<  119<H>  5.0   |  24  |  0.53    Ca    8.2<L>      30 Aug 2020 05:38  Mg     2.2     08-30    TPro  4.8<L>  /  Alb  2.6<L>  /  TBili  4.4<H>  /  DBili  x   /  AST  993<H>  /  ALT  222<H>  /  AlkPhos  1059<H>  08-30      Culture Data          Urinalysis + Microscopic Examination (08.29.20 @ 18:47)    Urine Appearance: Slightly Turbid    Urobilinogen: Negative    Specific Gravity: 1.024    Protein, Urine: 30 mg/dL    pH Urine: 6.5    Leukocyte Esterase Concentration: Negative    Nitrite: Negative    Ketone - Urine: Negative    Bilirubin: Small    Color: Yellow    Glucose Qualitative, Urine: Negative    Blood, Urine: Small    Red Blood Cell - Urine: 2 /hpf    White Blood Cell - Urine: 1 /HPF    Epithelial Cells: 2 /hpf    Hyaline Casts: 1 /lpf    Bacteria: Negative      < from: CT Head w/wo IV Cont (08.29.20 @ 20:41) >  XAM:  CT BRAIN WAW IC                            PROCEDURE DATE:  08/29/2020            INTERPRETATION:  CLINICAL INDICATION: Altered mental status, metastatic breast cancer    TECHNIQUE: Noncontrast and contrast-enhanced axial CT images were acquired through the head. Two-dimensional sagittal and coronal reformats were generated.    90 cc of Omnipaque 350 were injected and 10 cc were discarded.    COMPARISON STUDY: None    FINDINGS:    The ventricles and sulci are unremarkable in size for age. There are no areas of abnormal attenuation within the brain parenchyma. There is no intraparenchymal hemorrhage, mass effect, or midline shift. No abnormal extra-axial fluid collections are present. The basal cisterns are patent. There is no abnormal intra or extra-axial enhancement.    Left maxillary sinus polyp versus retention cyst. The remaining paranasal sinuses and tympanomastoid cavities appear clear of acute disease. The visualized intraorbital comparments are unremarkable.    The calvarium is intact. The soft tissues of the scalp are unremarkable.    IMPRESSION:    No acute hemorrhage, mass effect, or midline shift.    No abnormal intra or extra-axial enhancement.      < end of copied text >      < from: CT Chest w/ IV Cont (08.29.20 @ 20:41) >    EXAM:  CT ABDOMEN AND PELVIS IC                          EXAM:  CT CHEST IC                            PROCEDURE DATE:  08/29/2020            INTERPRETATION:  CLINICAL INFORMATION: Altered mental status, metastatic breast cancer    COMPARISON: CT abdomen and pelvis from 7/2/2020, CT chest abdomen and pelvis from 5/20/2020    PROCEDURE:  CT of the Chest, Abdomen and Pelvis was performed with intravenous contrast.  Intravenous contrast: 90 ml Omnipaque 350. 10 ml discarded.  Oral contrast: None.  Sagittal and coronal reformats were performed.    FINDINGS:  CHEST:  LUNGS AND LARGE AIRWAYS: Patent central airways. Interval enlargement of all pleural-based nodules, for reference there is a 1.1 cm left lower lobe pleural-based nodule (2:40), previously measuring 6 mm. There is a new left upper lobe pulmonary nodule measuring 1.1 cm (2:26). Unchanged mild peripheral reticulation in the lingula.  PLEURA: Unchanged small loculated left pleural effusion with unchanged pleural thickening and enhancement.  VESSELS: No abdominal aortic aneurysm or dissection..  HEART: Heart size is normal. Small pericardial effusion again seen.  MEDIASTINUM AND ANA MARIA: No lymphadenopathy.  CHEST WALL AND LOWER NECK: Metallic surgical clip within the left axilla and right breast. Right chest wall port with tip in the distal SVC.    ABDOMEN AND PELVIS:  LIVER: Hepatomegaly. Again noted are extensive patchy areas of decreased attenuation throughout both lobes of the liver without vascular displacement. This appears overall unchanged from similar prior study on 7/2/2020. Unchanged nodular contour of the liver.  BILE DUCTS: Normal caliber.  GALLBLADDER: Cholelithiasis.  SPLEEN: Within normal limits.  PANCREAS: Within normal limits.  ADRENALS: Within normal limits.  KIDNEYS/URETERS: Enhance symmetrically without hydronephrosis..    BLADDER/REPRODUCTIVE ORGANS: Not well visualized secondary to extensive metallic streak artifact in the pelvis.    BOWEL: Evaluation of bowel limited without distention with oral contrast, however there is no bowel obstruction. Appendix is normal. Small bowel loops are not dilated.  PERITONEUM: Unchanged moderate volume ascites.  VESSELS: Aortic calcifications.  RETROPERITONEUM/LYMPH NODES: Unchanged 8 mm right retroperitonealnodule (2:101).  ABDOMINAL WALL: Tiny fat-containing umbilical hernia.  BONES: Status post bilateral total hip arthroplasties. Redemonstration of sclerotic lesions throughout the spine ribs, sternum, left humerus, and bony pelvis, compatible with metastatic disease. Multiple chronic rib fractures are again seen.    IMPRESSION:  Interval enlargement of all pleural based nodules with additional new left upper lobe pulmonary nodule, compatible with progression of metastatic disease.      Unchanged small loculated left pleural effusion with unchanged pleural thickening and enhancement.    Redemonstration of diffuse osseous metastatic disease.    Unchanged patchy low-density throughout the liver, likely hepatic steatosis, however focal hepatic lesion cannot be excluded. Contrast-enhanced liver MRI may be obtained for further evaluation as clinically warranted.        < end of copied text >    COVID-19  Antibody - for prior infection screening (08.30.20 @ 09:50)    COVID-19 IgG Antibody Index: 0.09: Roche ECLIA Total AB (LOVE)  NOTE: This result index represents a total antibody measurement,  which  includes IgG, IgA, and IgM  Negative <= 0.99 Index  Positive >= 1.00 Index Index    COVID-19 IgG Antibody Interpretation: Negative: This test has not been reviewed by the FDA by the standard review  process. It has been authorized for emergency use by the FDA. [employer] has validated this test to be accurate.  Negative results do not rule out SARS-CoV-2 infection, particularly in  those who have been in recent contact with the virus. Follow-up testing  with a molecular diagnostic test should be considered to rule out  infection in these individuals.  Results from antibody testing should not be used as thesole basis to  diagnose or exclude SARS-CoV-2 infection, or to inform infection status.  Positive results may rarely be due to past or present infection with  non-SARS-CoV-2 coronavirus strains, such as coronavirus HKU1, NL63, OC43,  or 229E. [employer], through extensive validation  testing, has confirmed that this risk is minimal with this test.      COVID-19 PCR . (08.29.20 @ 18:10)    COVID-19 PCR: NotDetec: Testing is performed using polymerase chain reaction (PCR) or  transcription mediated amplification (TMA). This COVID-19 (SARS-CoV-2)  nucleic acid amplification test was validated by [employer] and is  in use under the FDA Emergency Use Authorization (EUA) for clinical labs  CLIA-certified to perform high complexity testing. Test results should be  correlated with clinical presentation, patient history, and epidemiology.

## 2020-08-30 NOTE — H&P ADULT - PROBLEM SELECTOR PLAN 4
Pt significant transaminitis compared to prior. Significant transaminitis compared to prior. Previously attributed to recent cytoxan exposure as well underlying bulky mets and fatty liver disease due to unaddressed HLD 2/2 statin intolerance  -Trend liver enzymes  -Trend coags  -Consider hepatology consult

## 2020-08-30 NOTE — H&P ADULT - PROBLEM SELECTOR PLAN 5
Currently with some distension but pt does not appear grossly uncomfortable  -Consider GI consult regarding need to paracentesis, possible eval for SBP

## 2020-08-30 NOTE — CONSULT NOTE ADULT - ASSESSMENT
55f with breast ca chemo 3 weeks ago, thrombocytopenia, transaminitis, ascites   admitted with encephalopathy

## 2020-08-30 NOTE — H&P ADULT - HISTORY OF PRESENT ILLNESS
56yo female pt with PMHx of Breast Ca (dx 2005 with mets to liver, lung, bone, last dose Cytoxan was 3 weeks ago), GERD p/w AMS. Pt states she does remember why her brother brought her to the hospital but thinks he was concerned. As per ER conversation with brother, pt seemed more confused than usual over 2 days and concerned him. This AM she was more confused and not answering questions properly. Pt states she feels fatigued now but otherwise okay. Denies any recent complaints such as fever, chills, cough, abdominal pain, diarrhea. Denies any sick contacts as well. Pt does endorse doubling her dose of dilaudid 2 days ago to better help control bone pain from mets.    In ER: Given IV zosyn, Vancomycin,

## 2020-08-31 NOTE — PROGRESS NOTE ADULT - ASSESSMENT
56yo female pt with PMHx of Breast Ca (dx 2005 with mets to liver, lung, bone, last dose Cytoxan was 3 weeks ago), GERD p/w acute encephalopathy as well as SIRS. Unclear if encephalopathy more sepsis driven or opiate driven, likely multifactorial.

## 2020-08-31 NOTE — PROGRESS NOTE ADULT - PROBLEM SELECTOR PLAN 5
Currently with some distension but pt does not appear grossly uncomfortable  -US , IR eval for therapeutic drainage, diagnostic for r/o sbp-though suspicion low

## 2020-08-31 NOTE — PROGRESS NOTE ADULT - PROBLEM SELECTOR PLAN 1
cognition seems good currently, abd is mostly distended, unclear if acute presentation is being driven by and acute infectious process but do recommend continued abx for now

## 2020-08-31 NOTE — PROGRESS NOTE ADULT - PROBLEM SELECTOR PLAN 4
Significant transaminitis compared to prior.   chemo induced, vs mets vs fatty liver  elev bili and coags? hepatic insufficiency  check ammonia  Gi/hepatology cs

## 2020-08-31 NOTE — PROGRESS NOTE ADULT - ATTENDING COMMENTS
d/w  at bedside plan of care at length addressed all questions and concerns    [facility]   never

## 2020-08-31 NOTE — PROGRESS NOTE ADULT - SUBJECTIVE AND OBJECTIVE BOX
infectious diseases progress note:    WHITLEY SILVA is a 55y y. o. Female patient    Patient reports: pain is not controlled and is 8/10    ROS:    EYES:  Negative  blurry vision or double vision  GASTROINTESTINAL:  Negative for nausea, vomiting, diarrhea  -otherwise negative except for subjective    Allergies    Crestor (Joint Pain; Other)  HMG-CoA reductase inhibitors (Unknown)  Lipitor (Rash)  statins (Joint Pain; Rash)  Zocor (Joint Pain; Other)    Intolerances        ANTIBIOTICS/RELEVANT:  antimicrobials  acyclovir IVPB      acyclovir IVPB 650 milliGRAM(s) IV Intermittent every 8 hours  piperacillin/tazobactam IVPB.. 3.375 Gram(s) IV Intermittent every 8 hours  vancomycin  IVPB 1000 milliGRAM(s) IV Intermittent every 12 hours    immunologic:    OTHER:  chlorhexidine 2% Cloths 1 Application(s) Topical daily  enoxaparin Injectable 40 milliGRAM(s) SubCutaneous daily  lactated ringers. 500 milliLiter(s) IV Continuous <Continuous>  LORazepam     Tablet 0.5 milliGRAM(s) Oral at bedtime PRN  ondansetron Injectable 4 milliGRAM(s) IV Push every 6 hours PRN  pantoprazole    Tablet 40 milliGRAM(s) Oral before breakfast      Objective:  Last 24-Vital Signs Last 24 Hrs  T(C): 36.7 (31 Aug 2020 12:23), Max: 37 (30 Aug 2020 19:50)  T(F): 98 (31 Aug 2020 12:23), Max: 98.6 (30 Aug 2020 19:50)  HR: 101 (31 Aug 2020 12:23) (62 - 105)  BP: 101/68 (31 Aug 2020 12:23) (101/68 - 126/89)  BP(mean): --  RR: 20 (31 Aug 2020 12:23) (18 - 20)  SpO2: 94% (31 Aug 2020 12:23) (94% - 96%)    T(C): 36.7 (20 @ 12:23), Max: 37 (20 @ 19:50)  T(F): 98 (20 @ 12:23), Max: 98.6 (20 @ 19:50)  T(C): 36.7 (20 @ 12:23), Max: 37 (20 @ 19:50)  T(F): 98 (20 @ 12:23), Max: 98.6 (20 @ 19:50)  T(C): 36.7 (20 @ 12:23), Max: 37 (20 @ 19:50)  T(F): 98 (20 @ 12:23), Max: 98.6 (20 @ 19:50)    PHYSICAL EXAM:  Constitutional: Well-developed, well nourished  Eyes: PERRLA, EOMI  Ear/Nose/Throat: oropharynx normal	  Neck: no JVD, no lymphadenopathy, supple  Respiratory: no accessory muscle use, lung fields bilaterally clear but decreased in bases  Cardiovascular: distant  Gastrointestinal: firm, distended, tender, +BS  Extremities: no clubbing, no cyanosis, edema absent  Neuro: patient alert, oriented and appropriate  Skin: no sig lesions      LABS:                        10.6   3.80  )-----------( 18       ( 31 Aug 2020 06:49 )             32.9       WBC 3.80   @ 06:49  WBC 3.05   @ 05:38  WBC 2.95   @ 19:15          130<L>  |  94<L>  |  15  ----------------------------<  94  4.5   |  25  |  0.45<L>    Ca    7.8<L>      31 Aug 2020 06:49  Mg     2.2         TPro  4.8<L>  /  Alb  2.6<L>  /  TBili  4.4<H>  /  DBili  x   /  AST  993<H>  /  ALT  222<H>  /  AlkPhos  1059<H>        Creatinine, Serum: 0.45 mg/dL (20 @ 06:49)  Creatinine, Serum: 0.53 mg/dL (20 @ 05:38)  Creatinine, Serum: 0.48 mg/dL (20 @ 19:15)      PT/INR - ( 30 Aug 2020 09:36 )   PT: 18.4 sec;   INR: 1.59 ratio         PTT - ( 30 Aug 2020 09:36 )  PTT:26.9 sec  Urinalysis Basic - ( 31 Aug 2020 02:13 )    Color: Yellow / Appearance: Clear / S.016 / pH: x  Gluc: x / Ketone: Negative  / Bili: Negative / Urobili: Negative   Blood: x / Protein: Negative / Nitrite: Negative   Leuk Esterase: Negative / RBC: x / WBC x   Sq Epi: x / Non Sq Epi: x / Bacteria: x            MICROBIOLOGY:      Culture - Urine (collected 30 Aug 2020 00:48)  Source: .Urine Clean Catch (Midstream)  Final Report (30 Aug 2020 20:18):    <10,000 CFU/mL Normal Urogenital Starr    Culture - Blood (collected 29 Aug 2020 23:06)  Source: .Blood Blood  Preliminary Report (31 Aug 2020 01:02):    No growth to date.    Culture - Blood (collected 29 Aug 2020 23:06)  Source: .Blood Blood  Preliminary Report (31 Aug 2020 01:02):    No growth to date.        RADIOLOGY & ADDITIONAL STUDIES:    < from: CT Head w/wo IV Cont (20 @ 20:41) >  XAM:  CT BRAIN WAW IC                            PROCEDURE DATE:  2020            INTERPRETATION:  CLINICAL INDICATION: Altered mental status, metastatic breast cancer    TECHNIQUE: Noncontrast and contrast-enhanced axial CT images were acquired through the head. Two-dimensional sagittal and coronal reformats were generated.    90 cc of Omnipaque 350 were injected and 10 cc were discarded.    COMPARISON STUDY: None    FINDINGS:    The ventricles and sulci are unremarkable in size for age. There are no areas of abnormal attenuation within the brain parenchyma. There is no intraparenchymal hemorrhage, mass effect, or midline shift. No abnormal extra-axial fluid collections are present. The basal cisterns are patent. There is no abnormal intra or extra-axial enhancement.    Left maxillary sinus polyp versus retention cyst. The remaining paranasal sinuses and tympanomastoid cavities appear clear of acute disease. The visualized intraorbital comparments are unremarkable.    The calvarium is intact. The soft tissues of the scalp are unremarkable.    IMPRESSION:    No acute hemorrhage, mass effect, or midline shift.    No abnormal intra or extra-axial enhancement.    < end of copied text >< from: CT Abdomen and Pelvis w/ IV Cont (20 @ 20:41) >    EXAM:  CT ABDOMEN AND PELVIS IC                          EXAM:  CT CHEST IC                            PROCEDURE DATE:  2020            INTERPRETATION:  CLINICAL INFORMATION: Altered mental status, metastatic breast cancer    COMPARISON: CT abdomen and pelvis from 2020, CT chest abdomen and pelvis from 2020    PROCEDURE:  CT of the Chest, Abdomen and Pelvis was performed with intravenous contrast.  Intravenous contrast: 90 ml Omnipaque 350. 10 ml discarded.  Oral contrast: None.  Sagittal and coronal reformats were performed.    FINDINGS:  CHEST:  LUNGS AND LARGE AIRWAYS: Patent central airways. Interval enlargement of all pleural-based nodules, for reference there is a 1.1 cm left lower lobe pleural-based nodule (2:40), previously measuring 6 mm. There is a new left upper lobe pulmonary nodule measuring 1.1 cm (2:26). Unchanged mild peripheral reticulation in the lingula.  PLEURA: Unchanged small loculated left pleural effusion with unchanged pleural thickening and enhancement.  VESSELS: No abdominal aortic aneurysm or dissection..  HEART: Heart size is normal. Small pericardial effusion again seen.  MEDIASTINUM AND ANA MARIA: No lymphadenopathy.  CHEST WALL AND LOWER NECK: Metallic surgical clip within the left axilla and right breast. Right chest wall port with tip in the distal SVC.    ABDOMEN AND PELVIS:  LIVER: Hepatomegaly. Again noted are extensive patchy areas of decreased attenuation throughout both lobes of the liver without vascular displacement. This appears overall unchanged from similar prior study on 2020. Unchanged nodular contour of the liver.  BILE DUCTS: Normal caliber.  GALLBLADDER: Cholelithiasis.  SPLEEN: Within normal limits.  PANCREAS: Within normal limits.  ADRENALS: Within normal limits.  KIDNEYS/URETERS: Enhance symmetrically without hydronephrosis..    BLADDER/REPRODUCTIVE ORGANS: Not well visualized secondary to extensive metallic streak artifact in the pelvis.    BOWEL: Evaluation of bowel limited without distention with oral contrast, however there is no bowel obstruction. Appendix is normal. Small bowel loops are not dilated.  PERITONEUM: Unchanged moderate volume ascites.  VESSELS: Aortic calcifications.  RETROPERITONEUM/LYMPH NODES: Unchanged 8 mm right retroperitonealnodule (2:101).  ABDOMINAL WALL: Tiny fat-containing umbilical hernia.  BONES: Status post bilateral total hip arthroplasties. Redemonstration of sclerotic lesions throughout the spine ribs, sternum, left humerus, and bony pelvis, compatible with metastatic disease. Multiple chronic rib fractures are again seen.    IMPRESSION:  Interval enlargement of all pleural based nodules with additional new left upper lobe pulmonary nodule, compatible with progression of metastatic disease.      Unchanged small loculated left pleural effusion with unchanged pleural thickening and enhancement.    Redemonstration of diffuse osseous metastatic disease.    Unchanged patchy low-density throughout the liver, likely hepatic steatosis, however focal hepatic lesion cannot be excluded. Contrast-enhanced liver MRI may be obtained for further evaluation as clinically warranted.

## 2020-08-31 NOTE — PROGRESS NOTE ADULT - SUBJECTIVE AND OBJECTIVE BOX
Patient is a 55y old  Female who presents with a chief complaint of Altered mental status (31 Aug 2020 15:08)      INTERVAL HPI/OVERNIGHT EVENTS: noted  pt seen and examined  overall MS improved, more alert and oriented  c/o abd  and back pain      Vital Signs Last 24 Hrs  T(C): 37 (31 Aug 2020 19:27), Max: 37 (31 Aug 2020 19:27)  T(F): 98.6 (31 Aug 2020 19:27), Max: 98.6 (31 Aug 2020 19:27)  HR: 101 (31 Aug 2020 19:27) (62 - 105)  BP: 111/75 (31 Aug 2020 19:27) (101/68 - 115/79)  BP(mean): --  RR: 20 (31 Aug 2020 19:27) (18 - 20)  SpO2: 98% (31 Aug 2020 19:27) (94% - 98%)    acyclovir IVPB      acyclovir IVPB 650 milliGRAM(s) IV Intermittent every 8 hours  chlorhexidine 2% Cloths 1 Application(s) Topical daily  enoxaparin Injectable 40 milliGRAM(s) SubCutaneous daily  fentaNYL   Patch  25 MICROgram(s)/Hr. 1 Patch Transdermal every 48 hours  lactated ringers. 500 milliLiter(s) IV Continuous <Continuous>  LORazepam     Tablet 0.5 milliGRAM(s) Oral at bedtime PRN  ondansetron Injectable 4 milliGRAM(s) IV Push every 6 hours PRN  pantoprazole    Tablet 40 milliGRAM(s) Oral before breakfast  piperacillin/tazobactam IVPB.. 3.375 Gram(s) IV Intermittent every 8 hours      PHYSICAL EXAM:  GENERAL: NAD,   EYES: conjunctiva and sclera clear  ENMT: Moist mucous membranes  NECK: Supple, No JVD, Normal thyroid  CHEST/LUNG: non labored, cta b/l  HEART: Regular rate and rhythm; No murmurs, rubs, or gallops  ABDOMEN: Soft, Nontender, distended; Bowel sounds present  EXTREMITIES:  2+ Peripheral Pulses, No clubbing, cyanosis, or edema  LYMPH: No lymphadenopathy noted  SKIN: No rashes or lesions    Consultant(s) Notes Reviewed:  [x ] YES  [ ] NO  Care Discussed with Consultants/Other Providers [ x] YES  [ ] NO    LABS:                        10.6   3.80  )-----------( 18       ( 31 Aug 2020 06:49 )             32.9     08-31    130<L>  |  94<L>  |  15  ----------------------------<  94  4.5   |  25  |  0.45<L>    Ca    7.8<L>      31 Aug 2020 06:49  Mg     2.2     08-30    TPro  4.8<L>  /  Alb  2.6<L>  /  TBili  4.4<H>  /  DBili  x   /  AST  993<H>  /  ALT  222<H>  /  AlkPhos  1059<H>  08-30    PT/INR - ( 30 Aug 2020 09:36 )   PT: 18.4 sec;   INR: 1.59 ratio         PTT - ( 30 Aug 2020 09:36 )  PTT:26.9 sec  Urinalysis Basic - ( 31 Aug 2020 02:13 )    Color: Yellow / Appearance: Clear / S.016 / pH: x  Gluc: x / Ketone: Negative  / Bili: Negative / Urobili: Negative   Blood: x / Protein: Negative / Nitrite: Negative   Leuk Esterase: Negative / RBC: x / WBC x   Sq Epi: x / Non Sq Epi: x / Bacteria: x      CAPILLARY BLOOD GLUCOSE            Urinalysis Basic - ( 31 Aug 2020 02:13 )    Color: Yellow / Appearance: Clear / S.016 / pH: x  Gluc: x / Ketone: Negative  / Bili: Negative / Urobili: Negative   Blood: x / Protein: Negative / Nitrite: Negative   Leuk Esterase: Negative / RBC: x / WBC x   Sq Epi: x / Non Sq Epi: x / Bacteria: x        Culture - Urine (collected 30 Aug 2020 00:48)  Source: .Urine Clean Catch (Midstream)  Final Report (30 Aug 2020 20:18):    <10,000 CFU/mL Normal Urogenital Starr    Culture - Blood (collected 29 Aug 2020 23:06)  Source: .Blood Blood  Preliminary Report (31 Aug 2020 01:02):    No growth to date.    Culture - Blood (collected 29 Aug 2020 23:06)  Source: .Blood Blood  Preliminary Report (31 Aug 2020 01:02):    No growth to date.        RADIOLOGY & ADDITIONAL TESTS:    Imaging Personally Reviewed:  [x ] YES  [ ] NO

## 2020-08-31 NOTE — PROGRESS NOTE ADULT - PROBLEM SELECTOR PLAN 3
Pt with tachycardia and leukopenia meeting sepsis criteria. No obvious source at this point.   -Cont. empiric IV antibiotics for now  -Cont. gentle IVF  -F/u BCX and UCx neg  ID cs fu

## 2020-08-31 NOTE — PROGRESS NOTE ADULT - ASSESSMENT
55f with breast ca chemo 3 early Aug 2020, thrombocytopenia, transaminitis, abdominal pain   admitted with encephalopathy

## 2020-08-31 NOTE — PROGRESS NOTE ADULT - PROBLEM SELECTOR PLAN 1
likely dt excessive opiods and partly dt sepsis  -Cont. empiric IV antibiotics  -F/u BCx ngtd  -Gentle IVF  -Will cont. ativan for insomnia with hold parameter

## 2020-09-01 NOTE — PROGRESS NOTE ADULT - SUBJECTIVE AND OBJECTIVE BOX
[facility], Division of Infectious Diseases  LIZET Eldridge Y. Patel, S. Shah  [phone]    Name: WHITLEY SILVA  Age: 55y  Gender: Female  MRN: [id number]    Interval History--  Notes reviewed-- sister at bedside, pt more at baseline mental status  still with pain  abdomen still very distended    Past Medical History--  H/O pleural effusion  Breast Cancer h/o chemo and radiation in 2005  HPV (Human Papillomavirus) in 2004  GERD (Gastroesophageal Reflux Disease)  H/O: Osteoarthritis  Congenital Hip Dysplasia  Hyperlipidemia - allergic to all statin drugs - on no meds  H/O pleural effusion  Cryo ablation of cervix in 2006  h/o  Breast Lumpectomyn - left 2005  Breast Cancer - chemoport insertion  and removal 2005 and 2006  H/O: Myomectomy 2003  History of Total Hip Replacement left 2003      For details regarding the patient's social history, family history, and other miscellaneous elements, please refer the initial infectious diseases consultation and/or the admitting history and physical examination for this admission.    Allergies    Crestor (Joint Pain; Other)  HMG-CoA reductase inhibitors (Unknown)  Lipitor (Rash)  statins (Joint Pain; Rash)  Zocor (Joint Pain; Other)    Intolerances        Medications--  Antibiotics:  piperacillin/tazobactam IVPB.. 3.375 Gram(s) IV Intermittent every 8 hours    Immunologic:    Other:  chlorhexidine 2% Cloths  enoxaparin Injectable  fentaNYL   Patch  25 MICROgram(s)/Hr.  HYDROmorphone   Tablet PRN  lactated ringers.  LORazepam     Tablet PRN  ondansetron Injectable PRN  pantoprazole    Tablet      Review of Systems--  A 10-point review of systems was obtained.     Pertinent positives and negatives--  Constitutional: No fevers. No Chills. No Rigors.   Cardiovascular: No chest pain. No palpitations.  Respiratory: No shortness of breath. No cough.  Gastrointestinal: No nausea or vomiting. No diarrhea or constipation.   Psychiatric: no anxiety  Review of systems otherwise negative except as previously noted.    Physical Examination--  Vital Signs: T(F): 97.8 (09-01-20 @ 11:26), Max: 99.1 (09-01-20 @ 00:41)  HR: 105 (09-01-20 @ 11:26)  BP: 105/67 (09-01-20 @ 11:26)  RR: 18 (09-01-20 @ 11:26)  SpO2: 98% (09-01-20 @ 11:26)  Wt(kg): --  General: Nontoxic-appearing Female in no acute distress.  HEENT: AT/NC.   Neck: Not rigid. No sense of mass.  Nodes: None palpable.  Lungs: Clear bilaterally without rales, wheezing or rhonchi  Heart: Regular rate and rhythm.   Abdomen: Bowel sounds present and normoactive. Soft. distended. tender.   Extremities: No cyanosis or clubbing. + edema.   Skin: Warm. Dry. Good turgor. No rash. No vasculitic stigmata.  Psychiatric: Appropriate affect and mood for situation.         Laboratory Studies--  CBC                        11.7   4.41  )-----------( 15       ( 01 Sep 2020 07:27 )             35.9       Chemistries  09-01    127<L>  |  92<L>  |  14  ----------------------------<  77  4.2   |  23  |  0.48<L>    Ca    7.9<L>      01 Sep 2020 07:27    TPro  4.6<L>  /  Alb  2.4<L>  /  TBili  4.4<H>  /  DBili  x   /  AST  637<H>  /  ALT  187<H>  /  AlkPhos  1242<H>  09-01      Culture Data    Culture - Urine (collected 30 Aug 2020 00:48)  Source: .Urine Clean Catch (Midstream)  Final Report (30 Aug 2020 20:18):    <10,000 CFU/mL Normal Urogenital Starr    Culture - Blood (collected 29 Aug 2020 23:06)  Source: .Blood Blood  Preliminary Report (31 Aug 2020 01:02):    No growth to date.    Culture - Blood (collected 29 Aug 2020 23:06)  Source: .Blood Blood  Preliminary Report (31 Aug 2020 01:02):    No growth to date.        Urinalysis (08.31.20 @ 02:13)    Glucose Qualitative, Urine: Negative    Blood, Urine: Trace    pH Urine: 6.5    Color: Yellow    Urine Appearance: Clear    Bilirubin: Negative    Ketone - Urine: Negative    Specific Gravity: 1.016    Protein, Urine: Negative    Urobilinogen: Negative    Nitrite: Negative    Leukocyte Esterase Concentration: Negative        < from: US Abdomen Limited (09.01.20 @ 13:05) >    EXAM:  US ABDOMEN LIMITED                            PROCEDURE DATE:  09/01/2020            INTERPRETATION:  CLINICAL INFORMATION: Metastatic breast carcinoma with transaminitis and ascites to evaluate volume of ascites.    COMPARISON: Prior ultrasound dated 7/3/2020. Correlation is also made with abdominal CT dated 8/29/2020.    TECHNIQUE: All 4 quadrants were scanned..    FINDINGS:    Moderate volume ascites.    IMPRESSION:    Moderate volume ascites.    < end of copied text >

## 2020-09-01 NOTE — PROGRESS NOTE ADULT - PROBLEM SELECTOR PLAN 2
abdominal pain  ultrasound done  for IR drainage would send for cell count, culture  cont zosyn for possible peritonitis

## 2020-09-01 NOTE — CONSULT NOTE ADULT - SUBJECTIVE AND OBJECTIVE BOX
Patient is a 55y old  Female who presents with a chief complaint of Altered mental status (01 Sep 2020 15:40)      HPI:  56yo female pt with PMHx of Breast Ca (dx  with mets to liver, lung, bone, last dose Cytoxan was 3 weeks ago), GERD p/w AMS, now improved.  Currently complaining of worsening dyspnea related to abd distention and diaphragmatic pressure. She has had mild distention previously but nothing this severe.  Uncomfortable to lay in any position.  Noted platelets decreasing.  In ER: Given IV zosyn, Vancomycin,  (30 Aug 2020 02:08)      PAST MEDICAL & SURGICAL HISTORY:  H/O pleural effusion: Left treated 2019 s/p PleurX removed in 2019  Breast Cancer h/o chemo and radiation in   HPV (Human Papillomavirus) in   GERD (Gastroesophageal Reflux Disease)  H/O: Osteoarthritis  Congenital Hip Dysplasia  Hyperlipidemia - allergic to all statin drugs - on no meds  H/O pleural effusion: S/P Pleurx catheter and medi-port placement 2019  Cryo ablation of cervix in   h/o  Breast Lumpectomyn - left   Breast Cancer - chemoport insertion  and removal  and   H/O: Myomectomy 2003  History of Total Hip Replacement left : right thr       MEDICATIONS  (STANDING):  chlorhexidine 2% Cloths 1 Application(s) Topical daily  enoxaparin Injectable 40 milliGRAM(s) SubCutaneous daily  fentaNYL   Patch  25 MICROgram(s)/Hr. 1 Patch Transdermal every 48 hours  lactated ringers. 500 milliLiter(s) (100 mL/Hr) IV Continuous <Continuous>  pantoprazole    Tablet 40 milliGRAM(s) Oral before breakfast  piperacillin/tazobactam IVPB.. 3.375 Gram(s) IV Intermittent every 8 hours    MEDICATIONS  (PRN):  HYDROmorphone   Tablet 4 milliGRAM(s) Oral every 6 hours PRN Severe Pain (7 - 10)  LORazepam     Tablet 0.5 milliGRAM(s) Oral at bedtime PRN Anxiety  ondansetron Injectable 4 milliGRAM(s) IV Push every 6 hours PRN Nausea and/or Vomiting      Allergies    Crestor (Joint Pain; Other)  HMG-CoA reductase inhibitors (Unknown)  Lipitor (Rash)  statins (Joint Pain; Rash)  Zocor (Joint Pain; Other)    Intolerances        Review of Systems:    General:  No wt loss, fevers, chills, night sweats,fatigue,   CV:  No pain, palpitations, hypo/hypertension  Resp:  No dyspnea, cough, tachypnea, wheezing  GI:  see HPI  :  No pain, bleeding, incontinence, nocturia  Muscle:  No pain, weakness  Neuro:  No weakness, tingling, memory problems  Psych:  No fatigue, insomnia, mood problems, depression  Endocrine:  No polyuria, polydypsia, cold/heat intolerance  Heme:  No petechiae, ecchymosis, easy bruisability  Skin:  No rash, tattoos, scars, edema      Vital Signs Last 24 Hrs  T(C): 36.6 (01 Sep 2020 11:), Max: 37.3 (01 Sep 2020 00:41)  T(F): 97.8 (01 Sep 2020 11:), Max: 99.1 (01 Sep 2020 00:41)  HR: 105 (01 Sep 2020 11:) (100 - 105)  BP: 105/67 (01 Sep 2020 11:) (97/66 - 112/73)  BP(mean): --  RR: 18 (01 Sep 2020 11:) (18 - 20)  SpO2: 98% (01 Sep 2020 11:) (98% - 98%)    PHYSICAL EXAM:    Constitutional: NAD, well-developed  Neck: No LAD, supple  Respiratory: CTA and P, mild tachypnea  Cardiovascular: S1 and S2, RRR, no M  Gastrointestinal: BS+, soft, distended, mild diffuse tenderness  Extremities: No peripheral edema, neg clubbing, cyanosis  Vascular: 2+ peripheral pulses  Neurological: A/O x 3, no focal deficits  Psychiatric: Normal mood, normal affect  Skin: No rashes      LABS:                        11.7   4.41  )-----------( 15       ( 01 Sep 2020 07:27 )             35.9                         10.6   3.80  )-----------( 18       ( 31 Aug 2020 06:49 )             32.9                         10.8   3.05  )-----------( 22       ( 30 Aug 2020 05:38 )             33.4     09-01    127<L>  |  92<L>  |  14  ----------------------------<  77  4.2   |  23  |  0.48<L>    Ca    7.9<L>      01 Sep 2020 07:27    TPro  4.6<L>  /  Alb  2.4<L>  /  TBili  4.4<H>  /  DBili  x   /  AST  637<H>  /  ALT  187<H>  /  AlkPhos  1242<H>        Urinalysis Basic - ( 31 Aug 2020 02:13 )    Color: Yellow / Appearance: Clear / S.016 / pH: x  Gluc: x / Ketone: Negative  / Bili: Negative / Urobili: Negative   Blood: x / Protein: Negative / Nitrite: Negative   Leuk Esterase: Negative / RBC: x / WBC x   Sq Epi: x / Non Sq Epi: x / Bacteria: x      LIVER FUNCTIONS - ( 01 Sep 2020 07:27 )  Alb: 2.4 g/dL / Pro: 4.6 g/dL / ALK PHOS: 1242 U/L / ALT: 187 U/L / AST: 637 U/L / GGT: x         LIVER FUNCTIONS - ( 30 Aug 2020 05:38 )  Alb: 2.6 g/dL / Pro: 4.8 g/dL / ALK PHOS: 1059 U/L / ALT: 222 U/L / AST: 993 U/L / GGT: x         LIVER FUNCTIONS - ( 29 Aug 2020 19:15 )  Alb: 2.8 g/dL / Pro: 5.0 g/dL / ALK PHOS: 1092 U/L / ALT: 204 U/L / AST: 1105 U/L / GGT: x                 RADIOLOGY & ADDITIONAL TESTS:  < from: CT Abdomen and Pelvis w/ IV Cont (20 @ 20:41) >  LUNGS AND LARGE AIRWAYS: Patent central airways. Interval enlargement of all pleural-based nodules, for reference there is a 1.1 cm left lower lobe pleural-based nodule (2:40), previously measuring 6 mm. There is a new left upper lobe pulmonary nodule measuring 1.1 cm (2:26). Unchanged mild peripheral reticulation in the lingula.  PLEURA: Unchanged small loculated left pleural effusion with unchanged pleural thickening and enhancement.  VESSELS: No abdominal aortic aneurysm or dissection..  HEART: Heart size is normal. Small pericardial effusion again seen.  MEDIASTINUM AND ANA MARIA: No lymphadenopathy.  CHEST WALL AND LOWER NECK: Metallic surgical clip within the left axilla and right breast. Right chest wall port with tip in the distal SVC.    ABDOMEN AND PELVIS:  LIVER: Hepatomegaly. Again noted are extensive patchy areas of decreased attenuation throughout both lobes of the liver without vascular displacement. This appears overall unchanged from similar prior study on 2020. Unchanged nodular contour of the liver.    < end of copied text >  < from: US Abdomen Limited (20 @ 13:05) >  IMPRESSION:    Moderate volume ascites.    < end of copied text >

## 2020-09-01 NOTE — PROGRESS NOTE ADULT - SUBJECTIVE AND OBJECTIVE BOX
Patient is a 55y old  Female who presents with a chief complaint of Altered mental status (01 Sep 2020 17:32)      INTERVAL HPI/OVERNIGHT EVENTS: noted  pt seen and examined  MS improved, pain not well controlled      Vital Signs Last 24 Hrs  T(C): 36.6 (01 Sep 2020 11:26), Max: 37.3 (01 Sep 2020 00:41)  T(F): 97.8 (01 Sep 2020 11:), Max: 99.1 (01 Sep 2020 00:41)  HR: 105 (01 Sep 2020 11:) (100 - 105)  BP: 105/67 (01 Sep 2020 11:) (97/66 - 112/73)  BP(mean): --  RR: 18 (01 Sep 2020 11:) (18 - 20)  SpO2: 98% (01 Sep 2020 11:) (98% - 98%)    chlorhexidine 2% Cloths 1 Application(s) Topical daily  enoxaparin Injectable 40 milliGRAM(s) SubCutaneous daily  fentaNYL   Patch  25 MICROgram(s)/Hr. 1 Patch Transdermal every 48 hours  HYDROmorphone   Tablet 4 milliGRAM(s) Oral every 6 hours PRN  lactated ringers. 500 milliLiter(s) IV Continuous <Continuous>  LORazepam     Tablet 0.5 milliGRAM(s) Oral at bedtime PRN  ondansetron Injectable 4 milliGRAM(s) IV Push every 6 hours PRN  pantoprazole    Tablet 40 milliGRAM(s) Oral before breakfast  phytonadione   Solution 5 milliGRAM(s) Oral daily  piperacillin/tazobactam IVPB.. 3.375 Gram(s) IV Intermittent every 8 hours      PHYSICAL EXAM:  GENERAL: NAD,   EYES: conjunctiva and sclera clear  ENMT: Moist mucous membranes  NECK: Supple, No JVD, Normal thyroid  CHEST/LUNG: non labored, cta b/l  HEART: Regular rate and rhythm; No murmurs, rubs, or gallops  ABDOMEN: Soft, Nontender, distended; Bowel sounds present  EXTREMITIES:  2+ Peripheral Pulses, No clubbing, cyanosis, or edema  LYMPH: No lymphadenopathy noted  SKIN: No rashes or lesions    Consultant(s) Notes Reviewed:  [x ] YES  [ ] NO  Care Discussed with Consultants/Other Providers [ x] YES  [ ] NO    LABS:                        11.7   4.41  )-----------( 15       ( 01 Sep 2020 07:27 )             35.9         127<L>  |  92<L>  |  14  ----------------------------<  77  4.2   |  23  |  0.48<L>    Ca    7.9<L>      01 Sep 2020 07:27    TPro  4.6<L>  /  Alb  2.4<L>  /  TBili  4.4<H>  /  DBili  x   /  AST  637<H>  /  ALT  187<H>  /  AlkPhos  1242<H>        Urinalysis Basic - ( 31 Aug 2020 02:13 )    Color: Yellow / Appearance: Clear / S.016 / pH: x  Gluc: x / Ketone: Negative  / Bili: Negative / Urobili: Negative   Blood: x / Protein: Negative / Nitrite: Negative   Leuk Esterase: Negative / RBC: x / WBC x   Sq Epi: x / Non Sq Epi: x / Bacteria: x      CAPILLARY BLOOD GLUCOSE            Urinalysis Basic - ( 31 Aug 2020 02:13 )    Color: Yellow / Appearance: Clear / S.016 / pH: x  Gluc: x / Ketone: Negative  / Bili: Negative / Urobili: Negative   Blood: x / Protein: Negative / Nitrite: Negative   Leuk Esterase: Negative / RBC: x / WBC x   Sq Epi: x / Non Sq Epi: x / Bacteria: x        Culture - Urine (collected 30 Aug 2020 00:48)  Source: .Urine Clean Catch (Midstream)  Final Report (30 Aug 2020 20:18):    <10,000 CFU/mL Normal Urogenital Starr    Culture - Blood (collected 29 Aug 2020 23:06)  Source: .Blood Blood  Preliminary Report (31 Aug 2020 01:02):    No growth to date.    Culture - Blood (collected 29 Aug 2020 23:06)  Source: .Blood Blood  Preliminary Report (31 Aug 2020 01:02):    No growth to date.        RADIOLOGY & ADDITIONAL TESTS:    Imaging Personally Reviewed:  [x ] YES  [ ] NO

## 2020-09-01 NOTE — PROGRESS NOTE ADULT - PROBLEM SELECTOR PLAN 1
baseline mental status  no signs of meningitis and encephalitis   will d/c acyclovir now.  likely multifactorial including pain meds  ct head without mets

## 2020-09-01 NOTE — CONSULT NOTE ADULT - ASSESSMENT
Impression  Diffusely metastatic breast cancer with liver involvement admitted with AMS improved and currently c/o worsening dyspnea related to abdominal distention.    Thrombocytopenia may be secondary to cytoxan +/- liver dysfunction.  AMS may be sepsis related. Given clinical improvement since admission, favor against fulminant liver failure.  CT imaging reviewed, liver appears diffusely mottled and unlikely to have any stentable lesion.  Ascites favor malignant, though with hypoalbumemia and liver infiltration, hepatic or SBP on ddx.    Rec  Continue Abx per ID  Discuss with heme for any expected timing of platelet decrease and recovery with cytoxan.   Patient would benefit from dx/therapeutic tap when platelets improved.   INR elevation noted possibly malnourishment vs liver dysfunction. Give Vit K 5mg PO x 3.  Post parancentesis once patient can lie flat, MRI can be considered to determine any role for stentable pathology.  Goals of care.    [phone]

## 2020-09-01 NOTE — PROGRESS NOTE ADULT - PROBLEM SELECTOR PLAN 4
Significant transaminitis compared to prior.   chemo induced, vs mets vs fatty liver  elev bili and coags? hepatic insufficiency  Gi/hepatology cs appreciated

## 2020-09-01 NOTE — PROGRESS NOTE ADULT - PROBLEM SELECTOR PLAN 2
Hx of metastatic breast cancer dx in 2015 prior chemo/RT with known mets to brain, liver, and lung. Pt states last chemo was roughly 2 weeks ago? ANC 1650.  -Discussed GOC with patient and if she had any thoughts on what she would want in the event of a code. Pt states she would like to discuss CPR and intubation with her brother before making a decision. Pt is aware her default status will be full code. Her brother Bruno is her HCP.

## 2020-09-01 NOTE — PROGRESS NOTE ADULT - PROBLEM SELECTOR PLAN 8
fentany 25 and dilaudid po prn, hold for sedation  monitor MS with opiates on,  pall care cs pain control

## 2020-09-01 NOTE — PROGRESS NOTE ADULT - ATTENDING COMMENTS
Dr Barnes will be covering  starting 9/2/20  [phone] for questions or concerns

## 2020-09-02 NOTE — PROGRESS NOTE ADULT - PROBLEM SELECTOR PLAN 2
Hx of metastatic breast cancer dx in 2015 prior chemo/RT with known mets to brain, liver, and lung. Pt states last chemo was roughly 2-3 weeks ago  Her brother Bruno is her HCP.  dw oncology Dr. Landrum's office, and they feel patient is no longer candidate for chemo. they have been discussing with patient and patient is agreeable to hospice. She would like to home if family can provide a full time aid for her.   palliative care consult

## 2020-09-02 NOTE — PROGRESS NOTE ADULT - SUBJECTIVE AND OBJECTIVE BOX
+abdominal distention    Vital Signs Last 24 Hrs  T(C): 36.3 (02 Sep 2020 05:10), Max: 37.3 (02 Sep 2020 00:41)  T(F): 97.4 (02 Sep 2020 05:10), Max: 99.1 (02 Sep 2020 00:41)  HR: 97 (02 Sep 2020 05:10) (97 - 105)  BP: 109/73 (02 Sep 2020 05:10) (100/70 - 110/65)  BP(mean): --  RR: 18 (02 Sep 2020 09:11) (18 - 18)  SpO2: 99% (02 Sep 2020 09:11) (97% - 99%)    PHYSICAL EXAM:    Constitutional: NAD, well-developed  Neck: No LAD, supple  Respiratory: CTA and P  Cardiovascular: S1 and S2, RRR, no M  Gastrointestinal: BS+, soft, distended ++ascites  Extremities: No peripheral edema, neg clubing, cyanosis  Vascular: 2+ peripheral pulses  Neurological: A/O x 3, no focal deficits  Psychiatric: Normal mood, normal affect  Skin: No rashes    MEDICATIONS  (STANDING):  chlorhexidine 2% Cloths 1 Application(s) Topical daily  enoxaparin Injectable 40 milliGRAM(s) SubCutaneous daily  fentaNYL   Patch  25 MICROgram(s)/Hr. 1 Patch Transdermal every 48 hours  lactated ringers. 500 milliLiter(s) (100 mL/Hr) IV Continuous <Continuous>  pantoprazole    Tablet 40 milliGRAM(s) Oral before breakfast  phytonadione   Solution 5 milliGRAM(s) Oral daily  piperacillin/tazobactam IVPB.. 3.375 Gram(s) IV Intermittent every 8 hours  sodium chloride 0.9%. 1000 milliLiter(s) (50 mL/Hr) IV Continuous <Continuous>    MEDICATIONS  (PRN):  HYDROmorphone   Tablet 4 milliGRAM(s) Oral every 6 hours PRN Severe Pain (7 - 10)  LORazepam     Tablet 0.5 milliGRAM(s) Oral at bedtime PRN Anxiety  ondansetron Injectable 4 milliGRAM(s) IV Push every 6 hours PRN Nausea and/or Vomiting      Allergies    Crestor (Joint Pain; Other)  HMG-CoA reductase inhibitors (Unknown)  Lipitor (Rash)  statins (Joint Pain; Rash)  Zocor (Joint Pain; Other)    Intolerances          LABS:                        10.7   3.93  )-----------( 14       ( 02 Sep 2020 07:23 )             32.7                         11.7   4.41  )-----------( 15       ( 01 Sep 2020 07:27 )             35.9                         10.6   3.80  )-----------( 18       ( 31 Aug 2020 06:49 )             32.9     09-02    127<L>  |  91<L>  |  15  ----------------------------<  82  4.1   |  24  |  0.47<L>    Ca    7.5<L>      02 Sep 2020 07:23    TPro  4.4<L>  /  Alb  2.3<L>  /  TBili  5.2<H>  /  DBili  x   /  AST  498<H>  /  ALT  162<H>  /  AlkPhos  1121<H>  09-02        LIVER FUNCTIONS - ( 02 Sep 2020 07:23 )  Alb: 2.3 g/dL / Pro: 4.4 g/dL / ALK PHOS: 1121 U/L / ALT: 162 U/L / AST: 498 U/L / GGT: x         LIVER FUNCTIONS - ( 01 Sep 2020 07:27 )  Alb: 2.4 g/dL / Pro: 4.6 g/dL / ALK PHOS: 1242 U/L / ALT: 187 U/L / AST: 637 U/L / GGT: x               RADIOLOGY & ADDITIONAL TESTS:

## 2020-09-02 NOTE — PHYSICAL THERAPY INITIAL EVALUATION ADULT - GENERAL OBSERVATIONS, REHAB EVAL
Pt seen for 45min PT Eval. Pt s/p AMS, with pmh Breast CA +mets to liver, lungs, bones. Pt rec'd semi supine in bed in NAD, distended abdomen, +R mediport, VSS. Pt willing  to work with PT.

## 2020-09-02 NOTE — PROGRESS NOTE ADULT - PROBLEM SELECTOR PLAN 5
IR eval for therapeutic drainage, diagnostic for r/o sbp  US abd -moderate ascites IR eval for therapeutic drainage, diagnostic for r/o sbp  platelet transfusion prior  US abd -moderate ascites

## 2020-09-02 NOTE — PROGRESS NOTE ADULT - ASSESSMENT
54yo female pt with PMHx of Breast Ca (dx 2005 with mets to liver, lung, bone, last dose Cytoxan was 3 weeks ago), GERD p/w acute encephalopathy as well as SIRS. Unclear if encephalopathy more sepsis driven or opiate driven, likely multifactorial, now resolved.

## 2020-09-02 NOTE — PROGRESS NOTE ADULT - ASSESSMENT
Impression  Diffusely metastatic breast cancer with liver involvement admitted with AMS improved and currently c/o worsening dyspnea related to abdominal distention.  CT imaging reviewed - no biliary dilation noted    Rec  Patient would benefit from dx/therapeutic tap when platelets improved. May benefit from plt transfusion and paracentesis to alleviate abd distention and sob.  INR elevation noted possibly malnourishment vs liver dysfunction. Give Vit K 5mg PO x 3.      [phone]

## 2020-09-02 NOTE — PHYSICAL THERAPY INITIAL EVALUATION ADULT - PRECAUTIONS/LIMITATIONS, REHAB EVAL
CONTINUED- US Abdomen: Moderate volume ascites.; DOPPLERS: No evidence of left lower extremity deep venous thrombosis.; HEAD CT: No acute hemorrhage, mass effect, or midline shift. No abnormal intra or extra-axial enhancement.; CT A&P/CHEST: Interval enlargement of all pleural based nodules with additional new left upper lobe pulmonary nodule, compatible with progression of metastatic disease. Unchanged small loculated left pleural effusion with unchanged pleural thickening and enhancement. Redemonstration of diffuse osseous metastatic disease. Unchanged patchy low-density throughout the liver, likely hepatic steatosis, however focal hepatic lesion cannot be excluded. Contrast-enhanced liver MRI may be obtained for further evaluation as clinically warranted.; CXR: Unchanged left pleural effusion.; ANKLE XRAY: Soft tissue swelling greatest over the lateral malleolus. No acute fracture or dislocation. There is no cortical erosion or aggressive osseous destruction to suggest osteomyelitis. Tiny chronic ossific body adjacent to the medial malleolus./fall precautions

## 2020-09-02 NOTE — CONSULT NOTE ADULT - SUBJECTIVE AND OBJECTIVE BOX
History of Present Illness: 55yFemale w/ pmhx breast cancer with mets, GERD, who presented with altered mental status and abdominal distention.     Allergies:   Crestor (Joint Pain; Other)  HMG-CoA reductase inhibitors (Unknown)  Lipitor (Rash)  statins (Joint Pain; Rash)  Zocor (Joint Pain; Other)    Medications (Antibiotics/Cardiovascular/Anticoagulants/Blood Products):     acyclovir IVPB: 263 mL/Hr IV Intermittent (09-01-20 @ 06:42)  piperacillin/tazobactam IVPB..: 25 mL/Hr IV Intermittent (09-02-20 @ 05:20)  vancomycin  IVPB: 250 mL/Hr IV Intermittent (08-31-20 @ 12:37)    Vital Signs:  T(F): 97.4 (09-02-20 @ 05:10), Max: 99.1 (09-02-20 @ 00:41)  HR: 97 (09-02-20 @ 05:10) (97 - 102)  BP: 109/73 (09-02-20 @ 05:10) (100/70 - 110/65)  RR: 18 (09-02-20 @ 09:11) (18 - 18)  SpO2: 99% (09-02-20 @ 09:11) (97% - 99%)    Relevant Lab Results:            10.7  3.93)-----(14     (09-02-20 @ 07:23)         32.7     127 | 91 | 15  --------------------< 82     (09-02-20 @ 07:23)  4.1 | 24 | 0.47       PT: 18.4<H> 08-30-20 @ 09:36  aPTT: 26.9<L> 08-30-20 @ 09:36   INR: 1.59<H> 08-30-20 @ 09:36    Imaging:   US with small to moderate ascites

## 2020-09-02 NOTE — PROGRESS NOTE ADULT - SUBJECTIVE AND OBJECTIVE BOX
Patient is a 55y old  Female who presents with a chief complaint of Altered mental status (02 Sep 2020 12:10)      SUBJECTIVE / OVERNIGHT EVENTS:    Patient seen and examined. co pain in between pain medication. co abd distension. feels anxious and oxygen makes her feel better. denies cp sob nvd.       Vital Signs Last 24 Hrs  T(C): 36.4 (02 Sep 2020 12:29), Max: 37.3 (02 Sep 2020 00:41)  T(F): 97.5 (02 Sep 2020 12:29), Max: 99.1 (02 Sep 2020 00:41)  HR: 105 (02 Sep 2020 12:29) (97 - 105)  BP: 108/73 (02 Sep 2020 12:29) (100/70 - 110/65)  BP(mean): --  RR: 18 (02 Sep 2020 12:29) (18 - 18)  SpO2: 96% (02 Sep 2020 12:29) (96% - 99%)  I&O's Summary    01 Sep 2020 07:01  -  02 Sep 2020 07:00  --------------------------------------------------------  IN: 1180 mL / OUT: 900 mL / NET: 280 mL        PE:  GENERAL: NAD, AAOx3, older than stated age  HEAD:  Atraumatic, Normocephalic  CHEST/LUNG: CTABL, No wheeze  HEART: Regular rate and rhythm; no murmur  ABDOMEN: Soft, distended; Bowel sounds present  EXTREMITIES:  2+ Peripheral Pulses, No clubbing, cyanosis, or edema  SKIN: No rashes or lesions  NEURO: No focal deficits    LABS:                        10.7   3.93  )-----------( 14       ( 02 Sep 2020 07:23 )             32.7     09-02    127<L>  |  91<L>  |  15  ----------------------------<  82  4.1   |  24  |  0.47<L>    Ca    7.5<L>      02 Sep 2020 07:23    TPro  4.4<L>  /  Alb  2.3<L>  /  TBili  5.2<H>  /  DBili  x   /  AST  498<H>  /  ALT  162<H>  /  AlkPhos  1121<H>  09-02      CAPILLARY BLOOD GLUCOSE                RADIOLOGY & ADDITIONAL TESTS:    Imaging Personally Reviewed:  [x] YES  [ ] NO    Consultant(s) Notes Reviewed:  [x] YES  [ ] NO    MEDICATIONS  (STANDING):  chlorhexidine 2% Cloths 1 Application(s) Topical daily  enoxaparin Injectable 40 milliGRAM(s) SubCutaneous daily  fentaNYL   Patch  25 MICROgram(s)/Hr. 1 Patch Transdermal every 48 hours  lactated ringers. 500 milliLiter(s) (100 mL/Hr) IV Continuous <Continuous>  pantoprazole    Tablet 40 milliGRAM(s) Oral before breakfast  phytonadione   Solution 5 milliGRAM(s) Oral daily  piperacillin/tazobactam IVPB.. 3.375 Gram(s) IV Intermittent every 8 hours  sodium chloride 0.9%. 1000 milliLiter(s) (50 mL/Hr) IV Continuous <Continuous>    MEDICATIONS  (PRN):  HYDROmorphone   Tablet 4 milliGRAM(s) Oral every 6 hours PRN Severe Pain (7 - 10)  LORazepam     Tablet 0.5 milliGRAM(s) Oral at bedtime PRN Anxiety  ondansetron Injectable 4 milliGRAM(s) IV Push every 6 hours PRN Nausea and/or Vomiting      Care Discussed with Consultants/Other Providers [x] YES  [ ] NO    HEALTH ISSUES - PROBLEM Dx:  Pain of metastatic malignancy: Pain of metastatic malignancy  Breast cancer metastasized to liver: Breast cancer metastasized to liver  Prophylactic measure: Prophylactic measure  Gastroesophageal reflux disease, esophagitis presence not specified: Gastroesophageal reflux disease, esophagitis presence not specified  Malignant ascites: Malignant ascites  Transaminitis: Transaminitis  Sepsis, due to unspecified organism, unspecified whether acute organ dysfunction present: Sepsis, due to unspecified organism, unspecified whether acute organ dysfunction present  Malignant neoplasm of female breast, unspecified estrogen receptor status, unspecified laterality, unspecified site of breast: Malignant neoplasm of female breast, unspecified estrogen receptor status, unspecified laterality, unspecified site of breast  Acute encephalopathy: Acute encephalopathy  Suspected deep vein thrombosis (DVT)  Suspected pulmonary embolism

## 2020-09-02 NOTE — PHYSICAL THERAPY INITIAL EVALUATION ADULT - PLANNED THERAPY INTERVENTIONS, PT EVAL
balance training/strengthening/transfer training/bed mobility training/gait training/GOAL: Pt will perform 14 stairs with or without U HR as needed within 4weeks.

## 2020-09-02 NOTE — PHYSICAL THERAPY INITIAL EVALUATION ADULT - ADDITIONAL COMMENTS
Pt lives in private home with ramp to enter, 1 flight of stairs to bedroom. 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 Pt states prior, she was indep with all ADLs, and ambulated with RW. Pt reports her sister is staying with her to assist her and her brother's needs.

## 2020-09-02 NOTE — CONSULT NOTE ADULT - ASSESSMENT
Assessment: 55y Female w/ hx of metastatic breast cancer who presented with altered mental status. Now with abdominal distention and shortness of breath with ascites seen on ultrasound, consult for diagnostic and therapeutic paracentesis.     Plan: Dx and Tx paracentesis   -Please place order for IR Procedure, approving attending Dr. Montanez  -will need 1 unit of platelets to be administered as patient as about to have procedure given count of 14  -hold therapeutic and prophylactic anticoagulants  -maintain active type and screen x 2

## 2020-09-02 NOTE — PHYSICAL THERAPY INITIAL EVALUATION ADULT - STRENGTHENING, PT EVAL
GOAL: Pt will improve her  UE and LE strength by at least 1/2 MMT grade  to assist with performing functional mobility and ADLs within 4 weeks.

## 2020-09-03 NOTE — CONSULT NOTE ADULT - ASSESSMENT
55 year old female pt with PMHx of Breast Ca (dx 2005 s/p mastectomy chemo/ 10 years of tamoxifen, leupron, and found to have  mets to liver, lung, bone in 2017, last dose Cytoxan was 3 weeks ago), GERD p/w AMS. 55 year old female pt with PMHx of Breast Ca (dx 2005 s/p mastectomy chemo/ 10 years of tamoxifen, leupron, and found to have  mets to liver, lung, bone in 2017, last dose Cytoxan was 3 weeks ago), GERD p/w AMS most likely 2/2 to increase in opioid dosage. There is low suspicion for infection, however still pending para to r/o SBP.

## 2020-09-03 NOTE — PROGRESS NOTE ADULT - SUBJECTIVE AND OBJECTIVE BOX
Patient is a 55y old  Female who presents with a chief complaint of Altered mental status (03 Sep 2020 08:46)      SUBJECTIVE / OVERNIGHT EVENTS:    Patient seen and examined. denies cp sob. co abd discomfort. awaiting IR paracentesis.      Vital Signs Last 24 Hrs  T(C): 36.9 (03 Sep 2020 10:42), Max: 36.9 (03 Sep 2020 10:42)  T(F): 98.4 (03 Sep 2020 10:42), Max: 98.4 (03 Sep 2020 10:42)  HR: 105 (03 Sep 2020 10:42) (98 - 105)  BP: 107/72 (03 Sep 2020 10:42) (100/58 - 123/85)  BP(mean): --  RR: 18 (03 Sep 2020 10:42) (18 - 18)  SpO2: 96% (03 Sep 2020 10:42) (96% - 98%)  I&O's Summary    02 Sep 2020 07:01  -  03 Sep 2020 07:00  --------------------------------------------------------  IN: 880 mL / OUT: 500 mL / NET: 380 mL    03 Sep 2020 07:01  -  03 Sep 2020 12:07  --------------------------------------------------------  IN: 200 mL / OUT: 0 mL / NET: 200 mL        PE:  GENERAL: NAD, AAOx3, older than stated age  HEAD:  Atraumatic, Normocephalic  CHEST/LUNG: CTABL, No wheeze  HEART: Regular rate and rhythm; no murmur  ABDOMEN: TTP, distended; Bowel sounds present  EXTREMITIES:  2+ Peripheral Pulses, No clubbing, cyanosis, or edema  SKIN: No rashes or lesions  NEURO: No focal deficits    LABS:                        10.4   4.01  )-----------( 22       ( 03 Sep 2020 06:18 )             31.0     09-03    121<L>  |  86<L>  |  15  ----------------------------<  89  4.1   |  24  |  0.41<L>    Ca    7.4<L>      03 Sep 2020 06:19  Phos  1.5     09-03    TPro  4.3<L>  /  Alb  2.2<L>  /  TBili  6.0<H>  /  DBili  x   /  AST  486<H>  /  ALT  151<H>  /  AlkPhos  1056<H>  09-03      CAPILLARY BLOOD GLUCOSE                RADIOLOGY & ADDITIONAL TESTS:    Imaging Personally Reviewed:  [x] YES  [ ] NO    Consultant(s) Notes Reviewed:  [x] YES  [ ] NO    MEDICATIONS  (STANDING):  chlorhexidine 4% Liquid 1 Application(s) Topical daily  fentaNYL   Patch  25 MICROgram(s)/Hr. 1 Patch Transdermal every 48 hours  lactated ringers. 500 milliLiter(s) (100 mL/Hr) IV Continuous <Continuous>  pantoprazole    Tablet 40 milliGRAM(s) Oral before breakfast  phytonadione   Solution 5 milliGRAM(s) Oral daily  piperacillin/tazobactam IVPB.. 3.375 Gram(s) IV Intermittent every 8 hours  potassium phosphate / sodium phosphate Powder (PHOS-NaK) 2 Packet(s) Oral two times a day  sodium chloride 0.9%. 1000 milliLiter(s) (50 mL/Hr) IV Continuous <Continuous>    MEDICATIONS  (PRN):  HYDROmorphone   Tablet 4 milliGRAM(s) Oral every 6 hours PRN Severe Pain (7 - 10)  LORazepam     Tablet 0.5 milliGRAM(s) Oral at bedtime PRN Anxiety  ondansetron Injectable 4 milliGRAM(s) IV Push every 6 hours PRN Nausea and/or Vomiting      Care Discussed with Consultants/Other Providers [x] YES  [ ] NO    HEALTH ISSUES - PROBLEM Dx:  Pain of metastatic malignancy: Pain of metastatic malignancy  Breast cancer metastasized to liver: Breast cancer metastasized to liver  Prophylactic measure: Prophylactic measure  Gastroesophageal reflux disease, esophagitis presence not specified: Gastroesophageal reflux disease, esophagitis presence not specified  Malignant ascites: Malignant ascites  Transaminitis: Transaminitis  Sepsis, due to unspecified organism, unspecified whether acute organ dysfunction present: Sepsis, due to unspecified organism, unspecified whether acute organ dysfunction present  Malignant neoplasm of female breast, unspecified estrogen receptor status, unspecified laterality, unspecified site of breast: Malignant neoplasm of female breast, unspecified estrogen receptor status, unspecified laterality, unspecified site of breast  Acute encephalopathy: Acute encephalopathy  Suspected deep vein thrombosis (DVT)  Suspected pulmonary embolism

## 2020-09-03 NOTE — CONSULT NOTE ADULT - PROBLEM SELECTOR RECOMMENDATION 3
-Most likely 2/2 to mod ascities   -Pending therapeutic para, suspect this will help  -Will reassess post para

## 2020-09-03 NOTE — PROGRESS NOTE ADULT - PROBLEM SELECTOR PLAN 8
fentany 25 and dilaudid po prn, hold for sedation  monitor MS with opiates on,  pall care cs pain control fentany 25 and dilaudid po prn, hold for sedation  monitor MS with opiates on  pall care cs for pain control

## 2020-09-03 NOTE — CONSULT NOTE ADULT - PROBLEM SELECTOR RECOMMENDATION 9
blood cx pending  ua neg for pyuria and pt had no gu symptoms that she complained of   no fever  no diarrhea   ct head without acute path  can cont empiric antibx-- until cx back   vancomycin -- 15-20  no meningeal signs ie headache, neck stiffness, photophobia  LP if continued encephalopathy  no s/s of pneumonia  may also need diagnositic paracentesis
likely hypervolemic  significant volume overload  check urine analysis  check urine osm/na/cl/k/cr  check serum osm/uric acid/ tsh/cortisol  will benefit from paracentesis  will need diuresis-- start lasix 40mg iv qd  low na diet  free h20 restriction to 800cc  recheck na in 8h
-Most likely 2/2 to metastatic disease given imaging findings  -Will change PO dilaudid to IV 1mg q3hr  -c/w fentanyl 25mcg  -Will consider adding dexamethasone 2mg BID when para results show no SBP
No

## 2020-09-03 NOTE — PROGRESS NOTE ADULT - PROBLEM SELECTOR PLAN 1
baseline mental status now  no signs of meningitis and encephalitis   likely multifactorial including pain meds  ct head without mets

## 2020-09-03 NOTE — PROGRESS NOTE ADULT - SUBJECTIVE AND OBJECTIVE BOX
+abdominal distention  Right rib pain.  More comfortable on current pain regimen.  Breathing stable.    Vital Signs Last 24 Hrs  T(C): 36.4 (03 Sep 2020 07:31), Max: 36.8 (02 Sep 2020 20:20)  T(F): 97.5 (03 Sep 2020 07:31), Max: 98.2 (02 Sep 2020 20:20)  HR: 101 (03 Sep 2020 07:31) (98 - 105)  BP: 123/85 (03 Sep 2020 07:31) (100/58 - 123/85)  BP(mean): --  RR: 18 (03 Sep 2020 07:31) (18 - 18)  SpO2: 98% (03 Sep 2020 07:31) (96% - 99%)    PHYSICAL EXAM:    Constitutional: NAD, well-developed  Neck: No LAD, supple  Respiratory: CTA and P  Cardiovascular: S1 and S2, RRR, no M  Gastrointestinal: BS+, soft, distended ++ascites  Extremities: No peripheral edema, neg clubing, cyanosis  Vascular: 2+ peripheral pulses  Neurological: A/O x 3, no focal deficits  Psychiatric: Normal mood, normal affect  Skin: No rashes    MEDICATIONS  (STANDING):  chlorhexidine 2% Cloths 1 Application(s) Topical daily  enoxaparin Injectable 40 milliGRAM(s) SubCutaneous daily  fentaNYL   Patch  25 MICROgram(s)/Hr. 1 Patch Transdermal every 48 hours  lactated ringers. 500 milliLiter(s) (100 mL/Hr) IV Continuous <Continuous>  pantoprazole    Tablet 40 milliGRAM(s) Oral before breakfast  phytonadione   Solution 5 milliGRAM(s) Oral daily  piperacillin/tazobactam IVPB.. 3.375 Gram(s) IV Intermittent every 8 hours  sodium chloride 0.9%. 1000 milliLiter(s) (50 mL/Hr) IV Continuous <Continuous>    MEDICATIONS  (PRN):  HYDROmorphone   Tablet 4 milliGRAM(s) Oral every 6 hours PRN Severe Pain (7 - 10)  LORazepam     Tablet 0.5 milliGRAM(s) Oral at bedtime PRN Anxiety  ondansetron Injectable 4 milliGRAM(s) IV Push every 6 hours PRN Nausea and/or Vomiting      Allergies    Crestor (Joint Pain; Other)  HMG-CoA reductase inhibitors (Unknown)  Lipitor (Rash)  statins (Joint Pain; Rash)  Zocor (Joint Pain; Other)    Intolerances        LABS:                        10.4   4.01  )-----------( 22       ( 03 Sep 2020 06:18 )             31.0     09-03    121<L>  |  86<L>  |  15  ----------------------------<  89  4.1   |  24  |  0.41<L>    Ca    7.4<L>      03 Sep 2020 06:19  Phos  1.5     09-03    TPro  4.3<L>  /  Alb  2.2<L>  /  TBili  6.0<H>  /  DBili  x   /  AST  486<H>  /  ALT  151<H>  /  AlkPhos  1056<H>  09-03    LIVER FUNCTIONS - ( 03 Sep 2020 06:19 )  Alb: 2.2 g/dL / Pro: 4.3 g/dL / ALK PHOS: 1056 U/L / ALT: 151 U/L / AST: 486 U/L / GGT: x                 RADIOLOGY & ADDITIONAL TESTS:

## 2020-09-03 NOTE — CONSULT NOTE ADULT - CONVERSATION DETAILS
Discussed her Dx, which is aware of her Dx. She feels like she has fought her breast, she mentions she underwent surgery, chemotherapy, and SERM over 15 years. Additionally she has been living with Stage 4 breast cancer since 2017. Her ultimate goal is to be home.     Amanda mentions she has four siblings, Uzma, Joe, Avery, and Bruno. She goes on to say that if she was not able to make medical decisions for herself, she would like Bruno to make decisions for her given he lives the closest. She does state that she has not filled any form to indicate this and would like to fill out a HCP form. We filled the form out together and she was provided with a copy and a copy through email. She states she would like me to communicate with her sister Bessy.     I spoke with Bessy at [phone]. She mentions she would like to hold a family meeting, thus a family meeting will be held tomorrow at 2pm.

## 2020-09-03 NOTE — PROGRESS NOTE ADULT - PROBLEM SELECTOR PLAN 2
Hx of metastatic breast cancer dx in 2015 prior chemo/RT with known mets to brain, liver, and lung. Pt states last chemo was roughly 2-3 weeks ago  Her brother Bruno is her HCP.  dw oncology Dr. Landrum's office, and they feel patient is no longer candidate for chemo. they have been discussing with patient and patient is agreeable to hospice. She would like to home if family can provide a full time aid for her.   palliative care consult Hx of metastatic breast cancer dx in 2015 prior chemo/RT with known mets to brain, liver, and lung. Pt states last chemo was roughly 2-3 weeks ago  Her brother Bruno is her HCP.  dw oncology Dr. Landrum's office, and they feel patient is no longer candidate for chemo. they have been discussing with patient and patient is agreeable to hospice. She would like to home if family can provide a full time aid for her.   palliative care consult for goals of care

## 2020-09-03 NOTE — CONSULT NOTE ADULT - PROBLEM SELECTOR RECOMMENDATION 2
-Most likely 2/2 to moderate ascites  -Pending therapeutic para, suspect this will help her abdominal pain  -Will reassess post para -Most likely 2/2 to moderate ascites  -Pending therapeutic para, suspect this will help her abdominal pain  -Will reassess post para and consider restarting decadron BID if tap rules out SBP

## 2020-09-03 NOTE — CONSULT NOTE ADULT - SUBJECTIVE AND OBJECTIVE BOX
HPI:  55 year old female pt with PMHx of Breast Ca (dx 2005 s/p mastectomy chemo/ 10 years of tamoxifen, leupron, and found to have  mets to liver, lung, bone in 2017, last dose Cytoxan was 3 weeks ago), GERD p/w AMS. Pt states she does remember why her brother brought her to the hospital but thinks he was concerned. As per ER conversation with brother, pt seemed more confused than usual over 2 days and concerned him. This AM she was more confused and not answering questions properly. Pt states she feels fatigued now but otherwise okay. Denies any recent complaints such as fever, chills, cough, abdominal pain, diarrhea. Denies any sick contacts as well. Pt does endorse doubling her dose of dilaudid 2 days ago to better help control bone pain from mets and as well as doubling of her Fentanyl given uncontrolled rib pain. Pt mentions not remembering what occurred when her brother was concerned about her.     Pt states her pain is mainly bilateral lower ribs as well as her abdomen. Her abdominal pain is described as pressure/ tightness that is 8/10, constant, relieved by Dilaudid.    In ER: Given IV zosyn, Vancomycin,  (30 Aug 2020 02:08)    PERTINENT PM/SXH:   H/O pleural effusion  Breast Cancer h/o chemo and radiation in 2005  HPV (Human Papillomavirus) in 2004  GERD (Gastroesophageal Reflux Disease)  H/O: Osteoarthritis  Congenital Hip Dysplasia  Hyperlipidemia - allergic to all statin drugs - on no meds    H/O pleural effusion  Cryo ablation of cervix in 2006  h/o  Breast Lumpectomyn - left 2005  Breast Cancer - chemoport insertion  and removal 2005 and 2006  H/O: Myomectomy 2003  History of Total Hip Replacement left 2003    FAMILY HISTORY:  FH: type 2 diabetes    ITEMS NOT CHECKED ARE NOT PRESENT    SOCIAL HISTORY:   Significant other/partner[ ]  Children[ ]  Yazidism/Spirituality:  Substance hx:  [ ]   Tobacco hx:  [ ]   Alcohol hx: [ ]   Home Opioid hx:  [ ] I-Stop Reference No:  Living Situation: [ ]Home  [ ]Long term care  [ ]Rehab [ ]Other    ADVANCE DIRECTIVES:    DNR  MOLST  [ ]  Living Will  [ ]   DECISION MAKER(s):  [ ] Health Care Proxy(s)  [ ] Surrogate(s)  [ ] Guardian           Name(s): Phone Number(s):    BASELINE (I)ADL(s) (prior to admission):  [place]: [ ]Total  [ ] Moderate [ ]Dependent    Allergies    Crestor (Joint Pain; Other)  HMG-CoA reductase inhibitors (Unknown)  Lipitor (Rash)  statins (Joint Pain; Rash)  Zocor (Joint Pain; Other)    Intolerances    MEDICATIONS  (STANDING):  chlorhexidine 4% Liquid 1 Application(s) Topical daily  fentaNYL   Patch  25 MICROgram(s)/Hr. 1 Patch Transdermal every 48 hours  lactated ringers. 500 milliLiter(s) (100 mL/Hr) IV Continuous <Continuous>  pantoprazole    Tablet 40 milliGRAM(s) Oral before breakfast  phytonadione   Solution 5 milliGRAM(s) Oral daily  piperacillin/tazobactam IVPB.. 3.375 Gram(s) IV Intermittent every 8 hours  potassium phosphate / sodium phosphate Powder (PHOS-NaK) 2 Packet(s) Oral two times a day  sodium chloride 0.9%. 1000 milliLiter(s) (50 mL/Hr) IV Continuous <Continuous>    MEDICATIONS  (PRN):  HYDROmorphone   Tablet 4 milliGRAM(s) Oral every 6 hours PRN Severe Pain (7 - 10)  LORazepam     Tablet 0.5 milliGRAM(s) Oral at bedtime PRN Anxiety  ondansetron Injectable 4 milliGRAM(s) IV Push every 6 hours PRN Nausea and/or Vomiting    PRESENT SYMPTOMS: [ ]Unable to obtain due to poor mentation   Source if other than patient:  [ ]Family   [ ]Team     Pain: [ ]yes [ ]no  QOL impact -   Location -                    Aggravating factors -  Quality -  Radiation -  Timing-  Severity (0-10 scale):  Minimal acceptable level (0-10 scale):     CPOT:    https://www.[facility].org/getattachment/[email or web address]/Critical-Care-Pain-Observation-Tool-(CPOT)      PAIN AD Score:     http://geriatrictoolkit.[place].[facility]/cog/painad.pdf (press ctrl +  left click to view)    Dyspnea:                           [ ]Mild [ ]Moderate [ ]Severe  Anxiety:                             [ ]Mild [ ]Moderate [ ]Severe  Fatigue:                             [ ]Mild [ ]Moderate [ ]Severe  Nausea:                             [ ]Mild [ ]Moderate [ ]Severe  Loss of appetite:              [ ]Mild [ ]Moderate [ ]Severe  Constipation:                    [ ]Mild [ ]Moderate [ ]Severe    Other Symptoms:  [ ]All other review of systems negative     Palliative Performance Status Version 2:         %    http://npcrc.org/files/news/palliative_performance_scale_ppsv2.pdf  PHYSICAL EXAM:  Vital Signs Last 24 Hrs  T(C): 36.9 (03 Sep 2020 10:42), Max: 36.9 (03 Sep 2020 10:42)  T(F): 98.4 (03 Sep 2020 10:42), Max: 98.4 (03 Sep 2020 10:42)  HR: 105 (03 Sep 2020 10:42) (98 - 105)  BP: 107/72 (03 Sep 2020 10:42) (100/58 - 123/85)  BP(mean): --  RR: 18 (03 Sep 2020 10:42) (18 - 18)  SpO2: 96% (03 Sep 2020 10:42) (96% - 98%) I&O's Summary    02 Sep 2020 07:01  -  03 Sep 2020 07:00  --------------------------------------------------------  IN: 880 mL / OUT: 500 mL / NET: 380 mL    03 Sep 2020 07:01  -  03 Sep 2020 12:17  --------------------------------------------------------  IN: 200 mL / OUT: 0 mL / NET: 200 mL      GENERAL:  [ ]Alert  [ ]Oriented x   [ ]Lethargic  [ ]Cachexia  [ ]Unarousable  [ ]Verbal  [ ]Non-Verbal  Behavioral:   [ ] Anxiety  [ ] Delirium [ ] Agitation [ ] Other  HEENT:  [ ]Normal   [ ]Dry mouth   [ ]ET Tube/Trach  [ ]Oral lesions  PULMONARY:   [ ]Clear [ ]Tachypnea  [ ]Audible excessive secretions   [ ]Rhonchi        [ ]Right [ ]Left [ ]Bilateral  [ ]Crackles        [ ]Right [ ]Left [ ]Bilateral  [ ]Wheezing     [ ]Right [ ]Left [ ]Bilateral  [ ]Diminished breath sounds [ ]right [ ]left [ ]bilateral  CARDIOVASCULAR:    [ ]Regular [ ]Irregular [ ]Tachy  [ ]Colin [ ]Murmur [ ]Other  GASTROINTESTINAL:  [ ]Soft  [ ]Distended   [ ]+BS  [ ]Non tender [ ]Tender  [ ]PEG [ ]OGT/ NGT  Last BM:     GENITOURINARY:  [ ]Normal [ ] Incontinent   [ ]Oliguria/Anuria   [ ]Ng  MUSCULOSKELETAL:   [ ]Normal   [ ]Weakness  [ ]Bed/Wheelchair bound [ ]Edema  NEUROLOGIC:   [ ]No focal deficits  [ ]Cognitive impairment  [ ]Dysphagia [ ]Dysarthria [ ]Paresis [ ]Other   SKIN:   [ ]Normal    [ ]Rash  [ ]Pressure ulcer(s)       Present on admission [ ]y [ ]n    CRITICAL CARE:  [ ] Shock Present  [ ]Septic [ ]Cardiogenic [ ]Neurologic [ ]Hypovolemic  [ ]  Vasopressors [ ]  Inotropes   [ ]Respiratory failure present [ ]Mechanical ventilation [ ]Non-invasive ventilatory support [ ]High flow    [ ]Acute  [ ]Chronic [ ]Hypoxic  [ ]Hypercarbic [ ]Other  [ ]Other organ failure     LABS:                        10.4   4.01  )-----------( 22       ( 03 Sep 2020 06:18 )             31.0   09-03    121<L>  |  86<L>  |  15  ----------------------------<  89  4.1   |  24  |  0.41<L>    Ca    7.4<L>      03 Sep 2020 06:19  Phos  1.5     09-03    TPro  4.3<L>  /  Alb  2.2<L>  /  TBili  6.0<H>  /  DBili  x   /  AST  486<H>  /  ALT  151<H>  /  AlkPhos  1056<H>  09-03        RADIOLOGY & ADDITIONAL STUDIES:    PROTEIN CALORIE MALNUTRITION PRESENT: [ ]mild [ ]moderate [ ]severe [ ]underweight [ ]morbid obesity  https://www.andeal.org/vault/7170/web/files/ONC/Table_Clinical%20Characteristics%20to%20Document%20Malnutrition-White%20JV%20et%20al%202012.pdf    Height (cm): 154.9 (08-30-20 @ 03:36), 154.9 (07-02-20 @ 20:00), 154.94 (05-28-20 @ 09:16)  Weight (kg): 65.7 (08-30-20 @ 03:36), 76.7 (07-02-20 @ 20:00), 72.6 (05-28-20 @ 09:16)  BMI (kg/m2): 27.4 (08-30-20 @ 03:36), 32 (07-02-20 @ 20:00), 30.2 (05-28-20 @ 09:16)    [ ]PPSV2 < or = to 30% [ ]significant weight loss  [ ]poor nutritional intake  [ ]anasarca     Albumin, Serum: 2.2 g/dL (09-03-20 @ 06:19)   [ ]Artificial Nutrition      REFERRALS:   [ ]Chaplaincy  [ ]Hospice  [ ]Child Life  [ ]Social Work  [ ]Case management [ ]Holistic Therapy     Goals of Care Document: HPI:  55 year old female pt with PMHx of Breast Ca (dx 2005 s/p mastectomy chemo/ 10 years of tamoxifen, leupron, and found to have  mets to liver, lung, bone in 2017, last dose Cytoxan was 3 weeks ago), GERD p/w AMS. Pt states she does remember why her brother brought her to the hospital but thinks he was concerned. As per ER conversation with brother, pt seemed more confused than usual over 2 days and concerned him. This AM she was more confused and not answering questions properly. Pt states she feels fatigued now but otherwise okay. Denies any recent complaints such as fever, chills, cough, abdominal pain, diarrhea. Denies any sick contacts as well. Pt does endorse doubling her dose of dilaudid 2 days ago to better help control bone pain from mets and as well as doubling of her Fentanyl given uncontrolled rib pain. Pt mentions not remembering what occurred when her brother was concerned about her.     Pt states her pain is mainly bilateral lower ribs as well as her abdomen. Her abdominal pain is described as pressure/ tightness that is 8/10, constant, relieved by Dilaudid. This abdominal pain has been causing her to have significant SOB that is what concerns her the most. She mentions bilateral lower rib pain. However, the R hurts more than the L. The right side is more of a burning sensation that radiates across her rib. This rib pain is 8/10 and 0/10 when she get Dilaudid 4mg PO. Her L rib pain is worse when there is palpation. Rib pain and SOB is worse when she sits up. Pt denies HA, CP, dysuria, cough, rashes, or bleeding.      PERTINENT PM/SXH:   H/O pleural effusion  Breast Cancer h/o chemo and radiation in 2005  HPV (Human Papillomavirus) in 2004  GERD (Gastroesophageal Reflux Disease)  H/O: Osteoarthritis  Congenital Hip Dysplasia  Hyperlipidemia - allergic to all statin drugs - on no meds    H/O pleural effusion  Cryo ablation of cervix in 2006  h/o  Breast Lumpectomyn - left 2005  Breast Cancer - chemoport insertion  and removal 2005 and 2006  H/O: Myomectomy 2003  History of Total Hip Replacement left 2003    FAMILY HISTORY:  FH: type 2 diabetes    ITEMS NOT CHECKED ARE NOT PRESENT    SOCIAL HISTORY:   Significant other/partner[ ]  Children[ ]  Hindu/Spirituality: Jehovah's witness   Substance hx:  [ ]   Tobacco hx:  [ ]   Alcohol hx: [ ]   Home Opioid hx:  [X ] I-Stop Reference No: [id number]  Living Situation: [X ]Home  [ ]Long term care  [ ]Rehab [ ]Other    ADVANCE DIRECTIVES:    DNR  MOLST  [ ]  Living Will  [ ]   DECISION MAKER(s):  [X ] Health Care Proxy(s)  [ ] Surrogate(s)  [ ] Guardian           Name(s): Phone Number(s): Bruno Mascorro [phone]    BASELINE (I)ADL(s) (prior to admission):  [place]: [ ]Total  [X ] Moderate [ ]Dependent    Allergies  Crestor (Joint Pain; Other)  HMG-CoA reductase inhibitors (Unknown)  Lipitor (Rash)  statins (Joint Pain; Rash)  Zocor (Joint Pain; Other)    MEDICATIONS  (STANDING):  chlorhexidine 4% Liquid 1 Application(s) Topical daily  fentaNYL   Patch  25 MICROgram(s)/Hr. 1 Patch Transdermal every 48 hours  lactated ringers. 500 milliLiter(s) (100 mL/Hr) IV Continuous <Continuous>  pantoprazole    Tablet 40 milliGRAM(s) Oral before breakfast  phytonadione   Solution 5 milliGRAM(s) Oral daily  piperacillin/tazobactam IVPB.. 3.375 Gram(s) IV Intermittent every 8 hours  potassium phosphate / sodium phosphate Powder (PHOS-NaK) 2 Packet(s) Oral two times a day  sodium chloride 0.9%. 1000 milliLiter(s) (50 mL/Hr) IV Continuous <Continuous>    MEDICATIONS  (PRN):  HYDROmorphone   Tablet 4 milliGRAM(s) Oral every 6 hours PRN Severe Pain (7 - 10)  LORazepam     Tablet 0.5 milliGRAM(s) Oral at bedtime PRN Anxiety  ondansetron Injectable 4 milliGRAM(s) IV Push every 6 hours PRN Nausea and/or Vomiting    PRESENT SYMPTOMS: [ ]Unable to obtain due to poor mentation   Source if other than patient:  [ ]Family   [ ]Team     Pain: [ X]yes [ ]no  QOL impact - Difficulties sitting, SOB  Location -              B/L rib pain and Abdominal      Aggravating factors - Palpation  Quality - Burning/ pressure  Radiation -Across her ribs  Timing- Comes and goes  Severity (0-10 scale):8/10  Minimal acceptable level (0-10 scale): 0/10    CPOT:    https://www.[facility].org/getattachment/[email or web address]/Critical-Care-Pain-Observation-Tool-(CPOT)      PAIN AD Score:     http://geriatrictoolkit.[facility]/cog/painad.pdf (press ctrl +  left click to view)    Dyspnea:                           [ X]Mild [ ]Moderate [ ]Severe  Anxiety:                             [ ]Mild [ ]Moderate [ ]Severe  Fatigue:                             [ ]Mild [ ]Moderate [ ]Severe  Nausea:                             [ ]Mild [ ]Moderate [ ]Severe  Loss of appetite:              [ ]Mild [X ]Moderate [ ]Severe  Constipation:                    [X]Mild [ ]Moderate [ ]Severe    Other Symptoms:  [ ]All other review of systems negative     Palliative Performance Status Version 2:      40   %    http://[email or web address].org/files/news/palliative_performance_scale_ppsv2.pdf    PHYSICAL EXAM:  Vital Signs Last 24 Hrs  T(C): 36.9 (03 Sep 2020 10:42), Max: 36.9 (03 Sep 2020 10:42)  T(F): 98.4 (03 Sep 2020 10:42), Max: 98.4 (03 Sep 2020 10:42)  HR: 105 (03 Sep 2020 10:42) (98 - 105)  BP: 107/72 (03 Sep 2020 10:42) (100/58 - 123/85)  RR: 18 (03 Sep 2020 10:42) (18 - 18)  SpO2: 96% (03 Sep 2020 10:42) (96% - 98%) I&O's Summary    02 Sep 2020 07:01  -  03 Sep 2020 07:00  --------------------------------------------------------  IN: 880 mL / OUT: 500 mL / NET: 380 mL    03 Sep 2020 07:01  -  03 Sep 2020 12:17  --------------------------------------------------------  IN: 200 mL / OUT: 0 mL / NET: 200 mL      GENERAL:  [X ]Alert  [X ]Oriented x3   [ ]Lethargic  [ ]Cachexia  [ ]Unarousable  [X]Verbal  [ ]Non-Verbal  Behavioral:   [ ] Anxiety  [ ] Delirium [ ] Agitation [ ] Other  HEENT:  [X ]Normal   [ ]Dry mouth   [ ]ET Tube/Trach  [ ]Oral lesions  PULMONARY:   [X ]Clear [X ]Tachypnea  [ ]Audible excessive secretions   [ ]Rhonchi        [ ]Right [ ]Left [ ]Bilateral  [ ]Crackles        [ ]Right [ ]Left [ ]Bilateral  [ ]Wheezing     [ ]Right [ ]Left [ ]Bilateral  [ ]Diminished breath sounds [ ]right [ ]left [ ]bilateral  CARDIOVASCULAR:    [X ]Regular [ ]Irregular [ ]Tachy  [ ]Colin [ ]Murmur [ ]Other  GASTROINTESTINAL:  [X ]Soft  [X ]Distended   [ ]+BS  [ ]Non tender [X ]Tender  [ ]PEG [ ]OGT/ NGT  Last BM: 9/1/2020    GENITOURINARY:  [X ]Normal [ ] Incontinent   [ ]Oliguria/Anuria   [ ]Ng  MUSCULOSKELETAL:   [X ]Normal   [ ]Weakness  [ ]Bed/Wheelchair bound [ ]Edema  NEUROLOGIC:   [ X]No focal deficits  [ ]Cognitive impairment  [ ]Dysphagia [ ]Dysarthria [ ]Paresis [ ]Other   SKIN:   [ X]Normal    [ ]Rash  [ ]Pressure ulcer(s)       Present on admission [ ]y [ ]n    CRITICAL CARE:  [ ] Shock Present  [ ]Septic [ ]Cardiogenic [ ]Neurologic [ ]Hypovolemic  [ ]  Vasopressors [ ]  Inotropes   [ ]Respiratory failure present [ ]Mechanical ventilation [ ]Non-invasive ventilatory support [ ]High flow    [ ]Acute  [ ]Chronic [ ]Hypoxic  [ ]Hypercarbic [ ]Other  [ ]Other organ failure     LABS:                        10.4   4.01  )-----------( 22       ( 03 Sep 2020 06:18 )             31.0   09-03    121<L>  |  86<L>  |  15  ----------------------------<  89  4.1   |  24  |  0.41<L>    Ca    7.4<L>      03 Sep 2020 06:19  Phos  1.5     09-03    TPro  4.3<L>  /  Alb  2.2<L>  /  TBili  6.0<H>  /  DBili  x   /  AST  486<H>  /  ALT  151<H>  /  AlkPhos  1056<H>  09-03      RADIOLOGY & ADDITIONAL STUDIES:    < from: US Abdomen Limited (09.01.20 @ 13:05) >  IMPRESSION:  Moderate volume ascites.      < from: CT Head w/wo IV Cont (08.29.20 @ 20:41) >  IMPRESSION:  No acute hemorrhage, mass effect, or midline shift.  No abnormal intra or extra-axial enhancement.    < from: CT Abdomen and Pelvis w/ IV Cont (08.29.20 @ 20:41) >  IMPRESSION:  Interval enlargement of all pleural based nodules with additional new left upper lobe pulmonary nodule, compatible with progression of metastatic disease.  Unchanged small loculated left pleural effusion with unchanged pleural thickening and enhancement.  Redemonstration of diffuse osseous metastatic disease.  Unchanged patchy low-density throughout the liver, likely hepatic steatosis, however focal hepatic lesion cannot be excluded. Contrast-enhanced liver MRI may be obtained for further evaluation as clinically warranted.      PROTEIN CALORIE MALNUTRITION PRESENT: [ ]mild [ ]moderate [ ]severe [ ]underweight [ ]morbid obesity  https://www.andeal.org/vault/2440/web/files/ONC/Table_Clinical%20Characteristics%20to%20Document%20Malnutrition-White%20JV%20et%20al%383541.pdf    Height (cm): 154.9 (08-30-20 @ 03:36), 154.9 (07-02-20 @ 20:00), 154.94 (05-28-20 @ 09:16)  Weight (kg): 65.7 (08-30-20 @ 03:36), 76.7 (07-02-20 @ 20:00), 72.6 (05-28-20 @ 09:16)  BMI (kg/m2): 27.4 (08-30-20 @ 03:36), 32 (07-02-20 @ 20:00), 30.2 (05-28-20 @ 09:16)    [ ]PPSV2 < or = to 30% [ ]significant weight loss  [ ]poor nutritional intake  [ ]anasarca     Albumin, Serum: 2.2 g/dL (09-03-20 @ 06:19)   [ ]Artificial Nutrition      REFERRALS:   [ ]Chaplaincy  [ ]Hospice  [ ]Child Life  [ ]Social Work  [ ]Case management [ ]Holistic Therapy     Goals of Care Document: HPI:  55 year old female pt with PMHx of Breast Ca (dx 2005 s/p mastectomy chemo/ 10 years of tamoxifen, leupron, and found to have  mets to liver, lung, bone in 2017, last dose Cytoxan was 3 weeks ago), GERD p/w AMS. Pt states she does remember why her brother brought her to the hospital but thinks he was concerned. As per ER conversation with brother, pt seemed more confused than usual over 2 days and concerned him. This AM she was more confused and not answering questions properly. Pt states she feels fatigued now but otherwise okay. Denies any recent complaints such as fever, chills, cough, abdominal pain, diarrhea. Denies any sick contacts as well. Pt does endorse doubling her dose of dilaudid 2 days ago to better help control bone pain from mets and as well as doubling of her Fentanyl given uncontrolled rib pain. Pt mentions not remembering what occurred when her brother was concerned about her.     Pt states her pain is mainly bilateral lower ribs as well as her abdomen. Her abdominal pain is described as pressure/ tightness that is 8/10, constant, relieved by Dilaudid. This abdominal pain has been causing her to have significant SOB that is what concerns her the most. She mentions bilateral lower rib pain. However, the R hurts more than the L. The right side is more of a burning sensation that radiates across her rib. This rib pain is 8/10 and 0/10 when she get Dilaudid 4mg PO. Her L rib pain is worse when there is palpation. Rib pain and SOB is worse when she sits up. Pt denies HA, CP, dysuria, cough, rashes, or bleeding.      PERTINENT PM/SXH:   H/O pleural effusion  Breast Cancer h/o chemo and radiation in 2005  HPV (Human Papillomavirus) in 2004  GERD (Gastroesophageal Reflux Disease)  H/O: Osteoarthritis  Congenital Hip Dysplasia  Hyperlipidemia - allergic to all statin drugs - on no meds    H/O pleural effusion  Cryo ablation of cervix in 2006  h/o  Breast Lumpectomyn - left 2005  Breast Cancer - chemoport insertion  and removal 2005 and 2006  H/O: Myomectomy 2003  History of Total Hip Replacement left 2003    FAMILY HISTORY:  FH: type 2 diabetes    ITEMS NOT CHECKED ARE NOT PRESENT    SOCIAL HISTORY:   Significant other/partner[ ]  Children[ ]  Cheondoism/Spirituality: Moravian   Substance hx:  [ ]   Tobacco hx:  [ ]   Alcohol hx: [ ]   Home Opioid hx:  [X ] I-Stop Reference No: [id number]  Living Situation: [X ]Home  [ ]Long term care  [ ]Rehab [ ]Other    ADVANCE DIRECTIVES:    DNR  MOLST  [ ]  Living Will  [ ]   DECISION MAKER(s):  [X ] Health Care Proxy(s)  [ ] Surrogate(s)  [ ] Guardian           Name(s): Phone Number(s): Bruno Mascorro [phone]    BASELINE (I)ADL(s) (prior to admission):  [place]: [ ]Total  [X ] Moderate [ ]Dependent    Allergies  Crestor (Joint Pain; Other)  HMG-CoA reductase inhibitors (Unknown)  Lipitor (Rash)  statins (Joint Pain; Rash)  Zocor (Joint Pain; Other)    MEDICATIONS  (STANDING):  chlorhexidine 4% Liquid 1 Application(s) Topical daily  fentaNYL   Patch  25 MICROgram(s)/Hr. 1 Patch Transdermal every 48 hours  lactated ringers. 500 milliLiter(s) (100 mL/Hr) IV Continuous <Continuous>  pantoprazole    Tablet 40 milliGRAM(s) Oral before breakfast  phytonadione   Solution 5 milliGRAM(s) Oral daily  piperacillin/tazobactam IVPB.. 3.375 Gram(s) IV Intermittent every 8 hours  potassium phosphate / sodium phosphate Powder (PHOS-NaK) 2 Packet(s) Oral two times a day  sodium chloride 0.9%. 1000 milliLiter(s) (50 mL/Hr) IV Continuous <Continuous>    MEDICATIONS  (PRN):  HYDROmorphone   Tablet 4 milliGRAM(s) Oral every 6 hours PRN Severe Pain (7 - 10)  LORazepam     Tablet 0.5 milliGRAM(s) Oral at bedtime PRN Anxiety  ondansetron Injectable 4 milliGRAM(s) IV Push every 6 hours PRN Nausea and/or Vomiting    PRESENT SYMPTOMS: [ ]Unable to obtain due to poor mentation   Source if other than patient:  [ ]Family   [ ]Team     Pain: [ X]yes [ ]no  QOL impact - Difficulties sitting, SOB  Location -              B/L rib pain and Abdominal      Aggravating factors - Palpation  Quality - Burning/ pressure  Radiation -Across her ribs  Timing- Comes and goes  Severity (0-10 scale):8/10  Minimal acceptable level (0-10 scale): 0/10    CPOT:    https://www.[facility].org/getattachment/[email or web address]/Critical-Care-Pain-Observation-Tool-(CPOT)      PAIN AD Score:     http://geriatrictoolkit.[facility]/cog/painad.pdf (press ctrl +  left click to view)    Dyspnea:                           [ X]Mild [ ]Moderate [ ]Severe  Anxiety:                             [ ]Mild [ ]Moderate [ ]Severe  Fatigue:                             [ ]Mild [ ]Moderate [ ]Severe  Nausea:                             [ ]Mild [ ]Moderate [ ]Severe  Loss of appetite:              [ ]Mild [X ]Moderate [ ]Severe  Constipation:                    [X]Mild [ ]Moderate [ ]Severe    Other Symptoms:  [ ]All other review of systems negative     Palliative Performance Status Version 2:      40   %    http://[email or web address]/files/news/palliative_performance_scale_[email or web address].pdf    PHYSICAL EXAM:  Vital Signs Last 24 Hrs  T(C): 36.9 (03 Sep 2020 10:42), Max: 36.9 (03 Sep 2020 10:42)  T(F): 98.4 (03 Sep 2020 10:42), Max: 98.4 (03 Sep 2020 10:42)  HR: 105 (03 Sep 2020 10:42) (98 - 105)  BP: 107/72 (03 Sep 2020 10:42) (100/58 - 123/85)  RR: 18 (03 Sep 2020 10:42) (18 - 18)  SpO2: 96% (03 Sep 2020 10:42) (96% - 98%) I&O's Summary    02 Sep 2020 07:01  -  03 Sep 2020 07:00  --------------------------------------------------------  IN: 880 mL / OUT: 500 mL / NET: 380 mL    03 Sep 2020 07:01  -  03 Sep 2020 12:17  --------------------------------------------------------  IN: 200 mL / OUT: 0 mL / NET: 200 mL      GENERAL:  [X ]Alert  [X ]Oriented x3   [ ]Lethargic  [ ]Cachexia  [ ]Unarousable  [X]Verbal  [ ]Non-Verbal  Behavioral:   [ ] Anxiety  [ ] Delirium [ ] Agitation [ ] Other  HEENT:  [X ]Normal   [ ]Dry mouth   [ ]ET Tube/Trach  [ ]Oral lesions  PULMONARY:   [X ]Clear [X ]Tachypnea  [ ]Audible excessive secretions   [ ]Rhonchi        [ ]Right [ ]Left [ ]Bilateral  [ ]Crackles        [ ]Right [ ]Left [ ]Bilateral  [ ]Wheezing     [ ]Right [ ]Left [ ]Bilateral  [ ]Diminished breath sounds [ ]right [ ]left [ ]bilateral  CARDIOVASCULAR:    [X ]Regular [ ]Irregular [ ]Tachy  [ ]Colin [ ]Murmur [ ]Other  GASTROINTESTINAL:  [X ]Soft  [X ]Distended   [ ]+BS  [ ]Non tender [X ]Tender  [ ]PEG [ ]OGT/ NGT  Last BM: 9/1/2020    GENITOURINARY:  [X ]Normal [ ] Incontinent   [ ]Oliguria/Anuria   [ ]Ng  MUSCULOSKELETAL:   [X ]Normal   [ ]Weakness  [ ]Bed/Wheelchair bound [ ]Edema  NEUROLOGIC:   [ X]No focal deficits  [ ]Cognitive impairment  [ ]Dysphagia [ ]Dysarthria [ ]Paresis [ ]Other   SKIN:   [ X]Normal    [ ]Rash  [ ]Pressure ulcer(s)       Present on admission [ ]y [ ]n    CRITICAL CARE:  [ ] Shock Present  [ ]Septic [ ]Cardiogenic [ ]Neurologic [ ]Hypovolemic  [ ]  Vasopressors [ ]  Inotropes   [ ]Respiratory failure present [ ]Mechanical ventilation [ ]Non-invasive ventilatory support [ ]High flow    [ ]Acute  [ ]Chronic [ ]Hypoxic  [ ]Hypercarbic [ ]Other  [ ]Other organ failure     LABS:                        10.4   4.01  )-----------( 22       ( 03 Sep 2020 06:18 )             31.0   09-03    121<L>  |  86<L>  |  15  ----------------------------<  89  4.1   |  24  |  0.41<L>    Ca    7.4<L>      03 Sep 2020 06:19  Phos  1.5     09-03    TPro  4.3<L>  /  Alb  2.2<L>  /  TBili  6.0<H>  /  DBili  x   /  AST  486<H>  /  ALT  151<H>  /  AlkPhos  1056<H>  09-03      RADIOLOGY & ADDITIONAL STUDIES:    < from: US Abdomen Limited (09.01.20 @ 13:05) >  IMPRESSION:  Moderate volume ascites.      < from: CT Head w/wo IV Cont (08.29.20 @ 20:41) >  IMPRESSION:  No acute hemorrhage, mass effect, or midline shift.  No abnormal intra or extra-axial enhancement.    < from: CT Abdomen and Pelvis w/ IV Cont (08.29.20 @ 20:41) >  IMPRESSION:  Interval enlargement of all pleural based nodules with additional new left upper lobe pulmonary nodule, compatible with progression of metastatic disease.  Unchanged small loculated left pleural effusion with unchanged pleural thickening and enhancement.  Redemonstration of diffuse osseous metastatic disease.  Unchanged patchy low-density throughout the liver, likely hepatic steatosis, however focal hepatic lesion cannot be excluded. Contrast-enhanced liver MRI may be obtained for further evaluation as clinically warranted.      PROTEIN CALORIE MALNUTRITION PRESENT: [ ]mild [x ]moderate [ ]severe [ ]underweight [ ]morbid obesity  https://www.andeal.org/vault/2440/web/files/ONC/Table_Clinical%20Characteristics%20to%20Document%20Malnutrition-White%20JV%20et%20al%599732.pdf    Height (cm): 154.9 (08-30-20 @ 03:36), 154.9 (07-02-20 @ 20:00), 154.94 (05-28-20 @ 09:16)  Weight (kg): 65.7 (08-30-20 @ 03:36), 76.7 (07-02-20 @ 20:00), 72.6 (05-28-20 @ 09:16)  BMI (kg/m2): 27.4 (08-30-20 @ 03:36), 32 (07-02-20 @ 20:00), 30.2 (05-28-20 @ 09:16)    [ ]PPSV2 < or = to 30% [x ]significant weight loss  [x ]poor nutritional intake  [ ]anasarca     Albumin, Serum: 2.2 g/dL (09-03-20 @ 06:19)   [ ]Artificial Nutrition      REFERRALS:   [ ]Chaplaincy  [ ]Hospice  [ ]Child Life  [ ]Social Work  [x ]Case management [ ]Holistic Therapy     Goals of Care Document:

## 2020-09-03 NOTE — PROVIDER CONTACT NOTE (CRITICAL VALUE NOTIFICATION) - ASSESSMENT
pt AOx4, in no acute distress, VSS, pt with c/o pain, PRN med given as ordered pt AOx4, in no acute distress, VSS, pt with c/o pain, PRN med given as ordered, pt went to IR today for paracentesis

## 2020-09-03 NOTE — PROGRESS NOTE ADULT - PROBLEM SELECTOR PLAN 5
IR eval for therapeutic drainage, therapeutic and diagnostic for r/o sbp  platelet transfusion prior  US abd -moderate ascites

## 2020-09-03 NOTE — CONSULT NOTE ADULT - PROBLEM SELECTOR RECOMMENDATION 6
-HCP filled and in chart  -GOC above  -Family meeting tomorrow 2pm  -Will continue with fentanyl and chage PO dilaudid to IV, See above for recs

## 2020-09-03 NOTE — PROGRESS NOTE ADULT - ASSESSMENT
Impression  Diffusely metastatic breast cancer with liver involvement admitted with AMS improved and currently c/o worsening dyspnea related to abdominal distention.  CT imaging reviewed - no biliary dilation noted    Rec  For platelet transfusion and IR dx/th paracentesis.  Bilirubin continues to increase.  Goals of care.      [phone]

## 2020-09-03 NOTE — CONSULT NOTE ADULT - PROBLEM SELECTOR RECOMMENDATION 5
-GOC were held for 15 mins  -Please see above for GOC  -HCP filled and in chart, [facility] is [facility] [phone] -GOC were held for 20 mins  -Please see above for GOC  -HCP filled and in chart, [facility] is the [facility] [phone]

## 2020-09-03 NOTE — PROGRESS NOTE ADULT - SUBJECTIVE AND OBJECTIVE BOX
[facility], Division of Infectious Diseases  LIZET Eldridge Y. Patel, S. Shah  [phone]    Name: WHITLEY SILVA  Age: 55y  Gender: Female  MRN: [id number]    Interval History--  Notes reviewed  asking for pain meds      Past Medical History--  H/O pleural effusion  Breast Cancer h/o chemo and radiation in 2005  HPV (Human Papillomavirus) in 2004  GERD (Gastroesophageal Reflux Disease)  H/O: Osteoarthritis  Congenital Hip Dysplasia  Hyperlipidemia - allergic to all statin drugs - on no meds  H/O pleural effusion  Cryo ablation of cervix in 2006  h/o  Breast Lumpectomyn - left 2005  Breast Cancer - chemoport insertion  and removal 2005 and 2006  H/O: Myomectomy 2003  History of Total Hip Replacement left 2003      For details regarding the patient's social history, family history, and other miscellaneous elements, please refer the initial infectious diseases consultation and/or the admitting history and physical examination for this admission.    Allergies    Crestor (Joint Pain; Other)  HMG-CoA reductase inhibitors (Unknown)  Lipitor (Rash)  statins (Joint Pain; Rash)  Zocor (Joint Pain; Other)    Intolerances        Medications--  Antibiotics:  piperacillin/tazobactam IVPB.. 3.375 Gram(s) IV Intermittent every 8 hours    Immunologic:    Other:  chlorhexidine 4% Liquid  fentaNYL   Patch  25 MICROgram(s)/Hr.  HYDROmorphone  Injectable PRN  lactated ringers.  LORazepam     Tablet PRN  ondansetron Injectable PRN  pantoprazole    Tablet  phytonadione   Solution  potassium phosphate / sodium phosphate Powder (PHOS-NaK)  sodium chloride 0.9%.      Review of Systems--  A 10-point review of systems was obtained.     Pertinent positives and negatives--  Constitutional: No fevers. No Chills. No Rigors.   Cardiovascular: No chest pain. No palpitations.  Respiratory: No shortness of breath. No cough.  Gastrointestinal: No nausea or vomiting. No diarrhea or constipation.   Psychiatric: +depression  Review of systems otherwise negative except as previously noted.    Physical Examination--  Vital Signs: T(F): 97.7 (09-03-20 @ 14:24), Max: 98.4 (09-03-20 @ 10:42)  HR: 102 (09-03-20 @ 14:24)  BP: 110/74 (09-03-20 @ 14:24)  RR: 18 (09-03-20 @ 14:24)  SpO2: 97% (09-03-20 @ 14:24)  Wt(kg): --  General: Nontoxic-appearing Female in no acute distress.  HEENT: AT/NC. icteric. Conjunctiva pink and moist  Neck: Not rigid. No sense of mass.  Nodes: None palpable.  Lungs: Clear bilaterally without rales, wheezing or rhonchi  Heart:tachy s1s2  Abdomen: Bowel sounds present and normoactive. Soft. Nondistended. Nontender.   Back: No spinal tenderness. No costovertebral angle tenderness.   Extremities: No cyanosis or clubbing. + edema.   Skin: Warm. Dry. Good turgor. No rash. No vasculitic stigmata.  Psychiatric: Appropriate affect and mood for situation.         Laboratory Studies--  CBC                        10.4   4.01  )-----------( 22       ( 03 Sep 2020 06:18 )             31.0       Chemistries  09-03    121<L>  |  86<L>  |  15  ----------------------------<  89  4.1   |  24  |  0.41<L>    Ca    7.4<L>      03 Sep 2020 06:19  Phos  1.5     09-03    TPro  4.3<L>  /  Alb  2.2<L>  /  TBili  6.0<H>  /  DBili  x   /  AST  486<H>  /  ALT  151<H>  /  AlkPhos  1056<H>  09-03      Culture Data    Culture - Urine (collected 30 Aug 2020 00:48)  Source: .Urine Clean Catch (Midstream)  Final Report (30 Aug 2020 20:18):    <10,000 CFU/mL Normal Urogenital Starr    Culture - Blood (collected 29 Aug 2020 23:06)  Source: .Blood Blood  Preliminary Report (31 Aug 2020 01:02):    No growth to date.    Culture - Blood (collected 29 Aug 2020 23:06)  Source: .Blood Blood  Preliminary Report (31 Aug 2020 01:02):    No growth to date.          < from: US Abdomen Limited (09.01.20 @ 13:05) >  EXAM:  US ABDOMEN LIMITED                            PROCEDURE DATE:  09/01/2020            INTERPRETATION:  CLINICAL INFORMATION: Metastatic breast carcinoma with transaminitis and ascites to evaluate volume of ascites.    COMPARISON: Prior ultrasound dated 7/3/2020. Correlation is also made with abdominal CT dated 8/29/2020.    TECHNIQUE: All 4 quadrants were scanned..    FINDINGS:    Moderate volume ascites.    IMPRESSION:    Moderate volume ascites.    < end of copied text >

## 2020-09-03 NOTE — CONSULT NOTE ADULT - ASSESSMENT
56yo female pt with PMHx of Breast Ca (dx 2005 with mets to liver, lung, bone, last dose Cytoxan was 3 weeks ago), GERD p/w acute encephalopathy as well as SIRS.

## 2020-09-03 NOTE — CHART NOTE - NSCHARTNOTEFT_GEN_A_CORE
Notified about pt's complaints of pain. Pt received Dilaudid 4 mg at 01:30 and has Fentanyl 25mcg patch (changed yesterday). Pt seen at the bedside. Pt was sleeping at that time. Called back to the room, pt states "I have pain. I was ok, but my neighbor was screaming and I can't sleep now". While talking pt's eyes were closing in the middle of the sentence. A&O x3. At this time patient decided to wait longer with pain medications. Will continue to evaluate for pain and sedation with current pain medication regiment.     Roxie Nuno NP, #[id number]

## 2020-09-03 NOTE — CONSULT NOTE ADULT - SUBJECTIVE AND OBJECTIVE BOX
[facility] Consult Note Nephrology - CONSULTATION NOTE    56yo female pt with PMHx of Breast Ca (dx  with mets to liver, lung, bone, last dose Cytoxan was 3 weeks ago), GERD p/w AMS. Pt states she does remember why her brother brought her to the hospital but thinks he was concerned. As per ER conversation with brother, pt seemed more confused than usual over 2 days and concerned him. This AM she was more confused and not answering questions properly. Pt states she feels fatigued now but otherwise okay. Denies any recent complaints such as fever, chills, cough, abdominal pain, diarrhea. Denies any sick contacts as well. Pt does endorse doubling her dose of dilaudid 2 days ago to better help control bone pain from mets.    In ER: Given IV zosyn, Vancomycin,     Renal consult called for HYponatremia with serum na decreasing from 127 to 121  Pt with some sob and significant ascites and edema  denies any chest pain  has some nausea  denies any fevers      PAST MEDICAL & SURGICAL HISTORY:  H/O pleural effusion: Left treated 2019 s/p PleurX removed in 2019  Breast Cancer h/o chemo and radiation in   HPV (Human Papillomavirus) in   GERD (Gastroesophageal Reflux Disease)  H/O: Osteoarthritis  Congenital Hip Dysplasia  Hyperlipidemia - allergic to all statin drugs - on no meds  H/O pleural effusion: S/P Pleurx catheter and medi-port placement 2019  Cryo ablation of cervix in   h/o  Breast Lumpectomyn - left   Breast Cancer - chemoport insertion  and removal  and   H/O: Myomectomy   History of Total Hip Replacement left : right thr     Crestor (Joint Pain; Other)  HMG-CoA reductase inhibitors (Unknown)  Lipitor (Rash)  statins (Joint Pain; Rash)  Zocor (Joint Pain; Other)    Home Medications Reviewed  Hospital Medications:   MEDICATIONS  (STANDING):  chlorhexidine 4% Liquid 1 Application(s) Topical daily  fentaNYL   Patch  25 MICROgram(s)/Hr. 1 Patch Transdermal every 48 hours  lactated ringers. 500 milliLiter(s) (100 mL/Hr) IV Continuous <Continuous>  pantoprazole    Tablet 40 milliGRAM(s) Oral before breakfast  phytonadione   Solution 5 milliGRAM(s) Oral daily  piperacillin/tazobactam IVPB.. 3.375 Gram(s) IV Intermittent every 8 hours  potassium phosphate / sodium phosphate Powder (PHOS-NaK) 2 Packet(s) Oral two times a day  sodium chloride 0.9%. 1000 milliLiter(s) (50 mL/Hr) IV Continuous <Continuous>    SOCIAL HISTORY:  Denies ETOh,Smoking,   FAMILY HISTORY:  FH: type 2 diabetes    REVIEW OF SYSTEMS:  CONSTITUTIONAL: +weakness  EYES/ENT: No visual changes;  No vertigo or throat pain   NECK: No pain or stiffness  RESPIRATORY: ++ shortness of breath  CARDIOVASCULAR: No chest pain or palpitations.  GASTROINTESTINAL: No abdominal or epigastric pain. No nausea, vomiting, or hematemesis; No diarrhea or constipation. No melena or hematochezia.  GENITOURINARY: No dysuria, frequency, foamy urine, urinary urgency, incontinence or hematuria  NEUROLOGICAL: No numbness or weakness  SKIN: No itching, burning, rashes, or lesions   VASCULAR: ++ bilateral lower extremity edema.   All other review of systems is negative unless indicated above.    VITALS:  T(F): 97.7 (20 @ 14:24), Max: 98.4 (20 @ 10:42)  HR: 102 (20 @ 14:24)  BP: 110/74 (20 @ 14:24)  RR: 18 (20 @ 14:24)  SpO2: 97% (20 @ 14:24)  Wt(kg): --     @ 07:  -   @ 07:00  --------------------------------------------------------  IN: 880 mL / OUT: 500 mL / NET: 380 mL     @ 07:01  -   @ 16:14  --------------------------------------------------------  IN: 200 mL / OUT: 0 mL / NET: 200 mL        PHYSICAL EXAM:  Constitutional: NAD  HEENT: anicteric sclera, oropharynx clear, MMM  Neck: No JVD  Respiratory: bll  rhonchi  Cardiovascular: S1, S2, RRR  Gastrointestinal: BS+, soft, distended + ascites  Extremities: 3+ peripheral edema  Neurological: A/O x 3, no focal deficits  Psychiatric: Normal mood, normal affect  : No CVA tenderness. No gomez.       LABS:      121<L>  |  86<L>  |  15  ----------------------------<  89  4.1   |  24  |  0.41<L>    Ca    7.4<L>      03 Sep 2020 06:19  Phos  1.5         TPro  4.3<L>  /  Alb  2.2<L>  /  TBili  6.0<H>  /  DBili      /  AST  486<H>  /  ALT  151<H>  /  AlkPhos  1056<H>      Creatinine Trend: 0.41 <--, 0.47 <--, 0.48 <--, 0.45 <--, 0.53 <--, 0.48 <--                        10.4   4.01  )-----------(        ( 03 Sep 2020 06:18 )             31.0     Urine Studies:  Urinalysis Basic - ( 31 Aug 2020 02:13 )    Color: Yellow / Appearance: Clear / S.016 / pH:   Gluc:  / Ketone: Negative  / Bili: Negative / Urobili: Negative   Blood:  / Protein: Negative / Nitrite: Negative   Leuk Esterase: Negative / RBC:  / WBC    Sq Epi:  / Non Sq Epi:  / Bacteria:         RADIOLOGY & ADDITIONAL STUDIES:

## 2020-09-03 NOTE — PROGRESS NOTE ADULT - PROBLEM SELECTOR PLAN 2
abdominal pain  ultrasound done-- moderates ascites  for IR drainage would send for cell count, culture  cont zosyn for possible peritonitis

## 2020-09-03 NOTE — PROVIDER CONTACT NOTE (MEDICATION) - ASSESSMENT
pt AOx4, c/o pain, pt returned back from IR, VSS, pt scheduled for scheduled dose of potassium phosphate packet, med given up on return from IR with dinner

## 2020-09-03 NOTE — CONSULT NOTE ADULT - PROBLEM SELECTOR PROBLEM 4
Advanced directives, counseling/discussion Carcinoma of breast metastatic to multiple sites, unspecified laterality

## 2020-09-03 NOTE — PROCEDURE NOTE - PROCEDURE FINDINGS AND DETAILS
US guided paracentesis. 3.8L clear yellow fluid removed. Samples collected as above. Full report to follow.

## 2020-09-03 NOTE — CONSULT NOTE ADULT - ATTENDING COMMENTS
d/w brother at bedside
Patient seen and examined and agree with the above documentation with the following additions:  Patient with metastatic breast cancer, now no longer a candidate for DMT  Palliative consulted for pain management and goals of care. continues on fentanyl patch 25mcg.  Was on dilaudid 4mg PO q4h prn at home, and at 6h prn here. Gets relief but takes a while.  Patient also complains of dyspnea related to ascites fluid and pressure on diaphragm. Plan for diagnostic and therapeutic tap today to r/o SBP and relieve pressure.  Anticipate some relief of pain/dyspnea with this intervention. continue fentanyl patch 25mcg  change to dilaudid 1mg q3h prn for severe pain. If SBP r/o, will restart decadron BID.  bowel regimen. >16 minutes spent on ACP, HCP form completed today as above in GOC

## 2020-09-03 NOTE — CONSULT NOTE ADULT - CONVERSATION/DISCUSSION
Diagnosis/Hospice Referral/Prognosis/Chaplaincy Referral Diagnosis/Hospice Referral/Chaplaincy Referral

## 2020-09-03 NOTE — PROVIDER CONTACT NOTE (CRITICAL VALUE NOTIFICATION) - BACKGROUND
pt admitted with AMS, pt with PMH of Breast Cancer
pt admitted for altered mental status, acute encephalopathy

## 2020-09-03 NOTE — CONSULT NOTE ADULT - PROBLEM SELECTOR RECOMMENDATION 4
-Pt mentions she discussed her options with her oncologist and she made the decision to stop further chemotherapy as this can worsen her QOL.

## 2020-09-04 NOTE — PROGRESS NOTE ADULT - PROBLEM SELECTOR PLAN 8
fentany 25 and dilaudid po prn, hold for sedation  monitor MS with opiates on  pall care cs for pain control

## 2020-09-04 NOTE — PROGRESS NOTE ADULT - ASSESSMENT
Impression  Diffusely metastatic breast cancer with liver involvement admitted with AMS improved and currently c/o worsening dyspnea related to abdominal distention.  CT imaging reviewed - no biliary dilation noted    Fluid studies (High SAAG, Low protein, low cell count) c/w liver etiology. No evidence of SBP (though tested on abx).    Rec  Low utility to MR given CT review without any biliary dilation. Elevated bilirubin, INR, low platelets and ascites c/w diffuse liver infiltration and liver dysfunction.  Goals of care.      [phone]

## 2020-09-04 NOTE — PROGRESS NOTE ADULT - ASSESSMENT
56yo female pt with PMHx of Breast Ca (dx 2005 with mets to liver, lung, bone, last dose Cytoxan was 3 weeks ago), GERD p/w acute encephalopathy as well as SIRS. Unclear if encephalopathy more sepsis driven or opiate driven, likely multifactorial, now resolved.

## 2020-09-04 NOTE — PROGRESS NOTE ADULT - SUBJECTIVE AND OBJECTIVE BOX
[facility], Division of Infectious Diseases  LIZET Eldridge Y. Patel, S. Shah  [phone]    Name: WHITLEY SILVA  Age: 55y  Gender: Female  MRN: [id number]    Interval History--  Notes reviewed  felt great after paracentesis, but then had cramping which is causing her so much discomfort.    Past Medical History--  H/O pleural effusion  Breast Cancer h/o chemo and radiation in 2005  HPV (Human Papillomavirus) in 2004  GERD (Gastroesophageal Reflux Disease)  H/O: Osteoarthritis  Congenital Hip Dysplasia  Hyperlipidemia - allergic to all statin drugs - on no meds  H/O pleural effusion  Cryo ablation of cervix in 2006  h/o  Breast Lumpectomyn - left 2005  Breast Cancer - chemoport insertion  and removal 2005 and 2006  H/O: Myomectomy 2003  History of Total Hip Replacement left 2003      For details regarding the patient's social history, family history, and other miscellaneous elements, please refer the initial infectious diseases consultation and/or the admitting history and physical examination for this admission.    Allergies    Crestor (Joint Pain; Other)  HMG-CoA reductase inhibitors (Unknown)  Lipitor (Rash)  statins (Joint Pain; Rash)  Zocor (Joint Pain; Other)    Intolerances        Medications--  Antibiotics:  piperacillin/tazobactam IVPB.. 3.375 Gram(s) IV Intermittent every 8 hours    Immunologic:    Other:  albumin human 25% IVPB  chlorhexidine 4% Liquid  furosemide   Injectable  HYDROmorphone  Injectable PRN  HYDROmorphone PCA (1 mG/mL)  HYDROmorphone PCA (1 mG/mL) Rescue Clinician Bolus PRN  LORazepam     Tablet PRN  ondansetron Injectable PRN  pantoprazole    Tablet  polyethylene glycol 3350  senna  sodium chloride 0.9% lock flush  sodium phosphate IVPB      Review of Systems--  A 10-point review of systems was obtained.     Pertinent positives and negatives--  Constitutional: No fevers. No Chills. No Rigors.   Cardiovascular: No chest pain. No palpitations.  Respiratory: No shortness of breath. No cough.  Gastrointestinal: No nausea or vomiting. No diarrhea or constipation.   Psychiatric:+ depression    Review of systems otherwise negative except as previously noted.    Physical Examination--  Vital Signs: T(F): 98.1 (09-04-20 @ 14:02), Max: 99.1 (09-04-20 @ 00:50)  HR: 101 (09-04-20 @ 14:02)  BP: 109/68 (09-04-20 @ 14:02)  RR: 18 (09-04-20 @ 14:02)  SpO2: 99% (09-04-20 @ 14:02)  Wt(kg): --  General: Nontoxic-appearing Female in no acute distress.  HEENT: AT/NC. icteric.   Neck: Not rigid. No sense of mass.  Nodes: None palpable.  Lungs: Clear bilaterally without rales, wheezing or rhonchi  Heart tachy s1s2 No Murmur.   Abdomen: Bowel sounds present and normoactive. Soft. distended. Nontender.  Back: No spinal tenderness. No costovertebral angle tenderness.   Extremities: No cyanosis or clubbing. No edema.   Skin: Warm. Dry. Good turgor. No rash. No vasculitic stigmata.  Psychiatric: Appropriate affect and mood for situation.         Laboratory Studies--  CBC                        10.4   3.66  )-----------( 12       ( 04 Sep 2020 07:14 )             31.5       Chemistries  09-04    121<L>  |  82<L>  |  18  ----------------------------<  114<H>  4.1   |  25  |  0.53    Ca    7.4<L>      04 Sep 2020 10:33  Phos  1.4     09-04  Mg     1.0     09-04    TPro  2.6<L>  /  Alb  1.3<L>  /  TBili  3.9<H>  /  DBili  x   /  AST  349<H>  /  ALT  96<H>  /  AlkPhos  653<H>  09-04      Culture Data    Culture - Fungal, Body Fluid (collected 04 Sep 2020 01:42)  Source: .Body Fluid Peritoneal Fluid  Preliminary Report (04 Sep 2020 08:41):    Testing in progress    Culture - Body Fluid with Gram Stain (collected 04 Sep 2020 00:32)  Source: .Body Fluid Peritoneal Fluid  Gram Stain (04 Sep 2020 03:18):    polymorphonuclear leukocytes seen    No organisms seen    by cytocentrifuge    Culture - Urine (collected 30 Aug 2020 00:48)  Source: .Urine Clean Catch (Midstream)  Final Report (30 Aug 2020 20:18):    <10,000 CFU/mL Normal Urogenital Starr    Culture - Blood (collected 29 Aug 2020 23:06)  Source: .Blood Blood  Final Report (04 Sep 2020 01:00):    No Growth Final    Culture - Blood (collected 29 Aug 2020 23:06)  Source: .Blood Blood  Final Report (04 Sep 2020 01:00):    No Growth Final          Cell Count, Body Fluid (09.03.20 @ 19:26)    Other Body Cells: 3: To be reviewed by pathologist. %    Mesothelial Cells - Body Fluid: 5 %    Monocyte/Macrophage Count - Body Fluid: 10 %    Fluid Segmented Granulocytes: 63 %    Fluid Type: Peritoneal fl    Comment - Fluids: ICD10: C78.6    Body Fluid Appearance: Slightly Cloudy    BF Lymphocytes: 19 %    Nucleated Red Blood Cells - Body Fluid: 14 %    Tube Type: Sterile    Color - Body Fluid: Yellow    Total Nucleated Cell Count, Body Fluid: 177: Peritoneal/Pleural/ Pericardial  Body Fluid Types            Total Nucleated cells: <500/uL            Neutrophils: <25%          Synovial Body Fluid Type           Total Nucleated cells: <150/uL           Neutrophils: <25%           Lymphocytes: <75%           Moncytes/Macrophages: </=70%  Please note reference range updated effective Nov 26, 2019 /uL    Total RBC Count: 1510 /uL

## 2020-09-04 NOTE — PROGRESS NOTE ADULT - PROBLEM SELECTOR PLAN 4
Significant transaminitis compared to prior.   chemo induced, vs mets vs fatty liver  elev bili and coags? hepatic insufficiency  Gi/hepatology cs appreciated  hyponatremia, lasix 40 IV, renal following

## 2020-09-04 NOTE — PROGRESS NOTE ADULT - PROBLEM SELECTOR PLAN 1
low na- mixed etiology  U lytes show intravascular depletion  on lasix for volume overload and also s/p paracentesis  will give albumin q6h x 8 doses and c./w lasix  trend na q6h  d/w np and nurse

## 2020-09-04 NOTE — PROGRESS NOTE ADULT - CONVERSATION DETAILS
Held a [facility] meeting today with myself, Dr. Frost, Ulises COOMBS, the patient, and the patient's two siblings including the HCP, Bruno Mascorro. The other sibling was Bessy. They mention understanding the situation that Amanda is no longer a candidate for chemotherapy. They mention that they would like for her to go home with hospice, but do not understand what hospice is.    We discussed hospice and what they have to offer. They understood, and answered all questions. They understand that the plan is for home hospice with a PCA pump, however if Amanda's symptoms are uncontrollable, she may need to be transferred to an inpatient facility, which they are agreeable to.     We discussed the MOLST form and they patient made the decision that she would want a natural death, thus no compression, and would not want to be placed on mechanical ventilation. Thus, pt is DNR/DNI. Held a [facility] meeting today with myself, Dr. Frost, Ulises COOMBS, the patient, and the patient's two siblings including the HCP, Bruno Mascorro. The other sibling was sister Bessy. They mention understanding the situation that Amanda is no longer a candidate for chemotherapy. They mention that they would like for her to go home with hospice, but do not understand what hospice is.    We discussed hospice and what they have to offer. They understood, and answered all questions. They understand that the plan is for home hospice with a PCA pump, however if Amanda's symptoms are uncontrollable, she may need to be transferred to an inpatient facility, which they are agreeable to.     We discussed the MOLST form and the patient made the decision that she would want a natural death, thus no compression, and would not want to be placed on mechanical ventilation. Thus, pt is DNR/DNI.

## 2020-09-04 NOTE — PROGRESS NOTE ADULT - PROBLEM SELECTOR PLAN 6
-GOC held today, details above   -HCP in chart  -MOLST filled today and in chart, DNR/DNI  -Starting Dilaudid PCA for symptom control  -Will send hospice referral for home hospice -GOC held today, details above   -HCP in chart  -MOLST filled today and in chart, DNR/DNI  -Starting Dilaudid PCA for symptom control  -Will send hospice referral for home hospice  -will continue to follow

## 2020-09-04 NOTE — PROGRESS NOTE ADULT - PROBLEM SELECTOR PLAN 5
sp therapeutic and diagnostic paracentesis removal 3.8 L  monitor for reaccumulation  SP platelet transfusion prior

## 2020-09-04 NOTE — PROGRESS NOTE ADULT - ATTENDING COMMENTS
Patient seen and examined and agree with the above documentation with the following additions:   Patient with ongoing rib pain, decision made to stop fentanyl and trial dilaudid PCA  0.2/0.4//20/1 ordered today. DNR/DNI in place after meeting, patient expressed her wishes, MOLST completed.  GOC as above, hospice referral made, emotional support provided.  will continue to follow  [phone] if acute issues. please notify our team if using multiple CAB doses. Patient seen and examined and agree with the above documentation with the following additions:   Patient with ongoing rib pain, decision made to stop fentanyl and trial dilaudid PCA  0.2/0.4//20/1 ordered today. DNR/DNI in place after meeting, patient expressed her wishes, MOLST completed.  GOC as above, hospice referral made, emotional support provided. >46 minutes on ACP  will continue to follow  [phone] if acute issues. please notify our team if using multiple CAB doses.

## 2020-09-04 NOTE — PROGRESS NOTE ADULT - PROBLEM SELECTOR PLAN 3
likely 2/2 metastatic dz  sp diag and therapeutic paracentesis 9/3  fu cultures  BCX and UCx neg  ID following for abx

## 2020-09-04 NOTE — PROGRESS NOTE ADULT - PROBLEM SELECTOR PLAN 2
abdominal pain  ultrasound done-- moderates ascites  s/p IR drainage cell count, -- not consistent with peritonitis but pt has been on antibx   f/u culture  zosyn til sept 6 for possible peritonitis

## 2020-09-04 NOTE — PROGRESS NOTE ADULT - SUBJECTIVE AND OBJECTIVE BOX
Patient seen and examined  feels uncomfortable  s/p paracentesis yesterday    Crestor (Joint Pain; Other)  HMG-CoA reductase inhibitors (Unknown)  Lipitor (Rash)  statins (Joint Pain; Rash)  Zocor (Joint Pain; Other)    Hospital Medications:   MEDICATIONS  (STANDING):  albumin human 25% IVPB 100 milliLiter(s) IV Intermittent every 6 hours  chlorhexidine 4% Liquid 1 Application(s) Topical daily  fentaNYL   Patch  25 MICROgram(s)/Hr. 1 Patch Transdermal every 48 hours  furosemide   Injectable 40 milliGRAM(s) IV Push daily  pantoprazole    Tablet 40 milliGRAM(s) Oral before breakfast  piperacillin/tazobactam IVPB.. 3.375 Gram(s) IV Intermittent every 8 hours        VITALS:  T(F): 97.8 (20 @ 09:06), Max: 99.1 (20 @ 00:50)  HR: 101 (20 @ 09:06)  BP: 110/76 (20 @ 09:06)  RR: 18 (20 @ 09:06)  SpO2: 100% (20 @ 09:06)  Wt(kg): --     @ 07:  -   @ 07:00  --------------------------------------------------------  IN: 880 mL / OUT: 450 mL / NET: 430 mL     @ 07:  -   @ 13:37  --------------------------------------------------------  IN: 150 mL / OUT: 600 mL / NET: -450 mL        PHYSICAL EXAM:  Constitutional: NAD  HEENT: anicteric sclera, oropharynx clear, MMM  Neck: No JVD  Respiratory: bll  rhonchi  Cardiovascular: S1, S2, RRR  Gastrointestinal: BS+, soft, distended + ascites  Extremities: 3+ peripheral edema  Neurological: A/O x 3, no focal deficits  Psychiatric: Normal mood, normal affect  : No CVA tenderness. No gomez.   LABS:      121<L>  |  82<L>  |  18  ----------------------------<  114<H>  4.1   |  25  |  0.53    Ca    7.4<L>      04 Sep 2020 10:33  Phos  1.4       Mg     1.0         TPro  2.6<L>  /  Alb  1.3<L>  /  TBili  3.9<H>  /  DBili      /  AST  349<H>  /  ALT  96<H>  /  AlkPhos  653<H>      Creatinine Trend: 0.53 <--, 0.37 <--, 0.41 <--, 0.47 <--, 0.48 <--, 0.45 <--, 0.53 <--, 0.48 <--                        10.4   3.66  )-----------( 12       ( 04 Sep 2020 07:14 )             31.5     Urine Studies:  Urinalysis Basic - ( 31 Aug 2020 02:13 )    Color: Yellow / Appearance: Clear / S.016 / pH:   Gluc:  / Ketone: Negative  / Bili: Negative / Urobili: Negative   Blood:  / Protein: Negative / Nitrite: Negative   Leuk Esterase: Negative / RBC:  / WBC    Sq Epi:  / Non Sq Epi:  / Bacteria:       Osmolality, Random Urine: 738 mosm/Kg ( @ 04:46)  Potassium, Random Urine: 64 mmol/L ( @ 20:24)  Sodium, Random Urine: <35 mmol/L ( @ 20:24)  Chloride, Random Urine: <35 mmol/L ( @ 20:24)  Creatinine, Random Urine: 119 mg/dL ( @ 20:24)  Protein/Creatinine Ratio Calculation: 0.8 Ratio ( @ 20:24)    RADIOLOGY & ADDITIONAL STUDIES:

## 2020-09-04 NOTE — PROGRESS NOTE ADULT - PROBLEM SELECTOR PLAN 1
-Most likely 2/2 to metastatic disease given imaging findings  -C/w dilaudid IV 1mg q3hr  -c/w fentanyl 25mcg  -Will consider adding dexamethasone 2mg BID. -Most likely 2/2 to metastatic disease given imaging findings  -Will switch dilaudid IV 1mg q3hr to dilaudid PCA 0.2/0.4/20/1mg  -will discontinue fentanyl 25mcg  -Will hold off on dexamethasone 2mg BID given increased risk of bleeding in the setting of thrombocytopenia, patient agrees. -Most likely 2/2 to metastatic disease given imaging findings  -Will switch dilaudid IV 1mg q3hr to dilaudid PCA 0.2/0.4/20//1mg  -will discontinue fentanyl 25mcg  -Will hold off on dexamethasone 2mg BID given increased risk of bleeding in the setting of thrombocytopenia, patient agrees.

## 2020-09-04 NOTE — PROGRESS NOTE ADULT - PROBLEM SELECTOR PLAN 2
-Slight improvement s/p para. However her abdominal pain is now more crampy than pressure -Slight improvement s/p para. However her abdominal pain is now more crampy than pressure  -Dilaudid PCA as above

## 2020-09-04 NOTE — PROGRESS NOTE ADULT - PROBLEM SELECTOR PLAN 2
Hx of metastatic breast cancer dx in 2015 prior chemo/RT with known mets to brain, liver, and lung. Pt states last chemo was roughly 2-3 weeks ago  Her brother Bruno is her HCP.  dw oncology Dr. Landrum's office, and they feel patient is no longer candidate for chemo. they have been discussing with patient and patient is agreeable to hospice. She would like to home if family can provide a full time aid for her.   palliative care consult for goals of care, family mtg today

## 2020-09-04 NOTE — PROGRESS NOTE ADULT - SUBJECTIVE AND OBJECTIVE BOX
Nearly 4L removed.  Fluid studies reviewed.  Breathing easier now.  Persistent rib pain.    Vital Signs Last 24 Hrs  T(C): 36.6 (04 Sep 2020 09:06), Max: 37.3 (04 Sep 2020 00:50)  T(F): 97.8 (04 Sep 2020 09:06), Max: 99.1 (04 Sep 2020 00:50)  HR: 101 (04 Sep 2020 09:06) (97 - 105)  BP: 110/76 (04 Sep 2020 09:06) (100/60 - 113/78)  BP(mean): --  RR: 18 (04 Sep 2020 09:06) (18 - 18)  SpO2: 100% (04 Sep 2020 09:06) (96% - 100%)    PHYSICAL EXAM:    Constitutional: NAD, well-developed  Neck: No LAD, supple  Respiratory: CTA and P  Cardiovascular: S1 and S2, RRR, no M  Gastrointestinal: BS+, soft, improved distention  Extremities: No peripheral edema, neg clubing, cyanosis  Vascular: 2+ peripheral pulses  Neurological: A/O x 3, no focal deficits  Psychiatric: Normal mood, normal affect  Skin: No rashes    MEDICATIONS  (STANDING):  chlorhexidine 2% Cloths 1 Application(s) Topical daily  enoxaparin Injectable 40 milliGRAM(s) SubCutaneous daily  fentaNYL   Patch  25 MICROgram(s)/Hr. 1 Patch Transdermal every 48 hours  lactated ringers. 500 milliLiter(s) (100 mL/Hr) IV Continuous <Continuous>  pantoprazole    Tablet 40 milliGRAM(s) Oral before breakfast  phytonadione   Solution 5 milliGRAM(s) Oral daily  piperacillin/tazobactam IVPB.. 3.375 Gram(s) IV Intermittent every 8 hours  sodium chloride 0.9%. 1000 milliLiter(s) (50 mL/Hr) IV Continuous <Continuous>    MEDICATIONS  (PRN):  HYDROmorphone   Tablet 4 milliGRAM(s) Oral every 6 hours PRN Severe Pain (7 - 10)  LORazepam     Tablet 0.5 milliGRAM(s) Oral at bedtime PRN Anxiety  ondansetron Injectable 4 milliGRAM(s) IV Push every 6 hours PRN Nausea and/or Vomiting      Allergies    Crestor (Joint Pain; Other)  HMG-CoA reductase inhibitors (Unknown)  Lipitor (Rash)  statins (Joint Pain; Rash)  Zocor (Joint Pain; Other)    Intolerances      LABS:                        10.4   3.66  )-----------( 12       ( 04 Sep 2020 07:14 )             31.5     09-04    121<L>  |  82<L>  |  18  ----------------------------<  114<H>  4.1   |  25  |  0.53    Ca    7.4<L>      04 Sep 2020 10:33  Phos  1.4     09-04  Mg     1.0     09-04    TPro  2.6<L>  /  Alb  1.3<L>  /  TBili  3.9<H>  /  DBili  x   /  AST  349<H>  /  ALT  96<H>  /  AlkPhos  653<H>  09-04    LIVER FUNCTIONS - ( 04 Sep 2020 07:14 )  Alb: 1.3 g/dL / Pro: 2.6 g/dL / ALK PHOS: 653 U/L / ALT: 96 U/L / AST: 349 U/L / GGT: x                               RADIOLOGY & ADDITIONAL TESTS:

## 2020-09-04 NOTE — PROGRESS NOTE ADULT - PROBLEM SELECTOR PLAN 5
-Plan for Family meeting today at 2pm -Held GOC meeting today with pt and siblings, details documented above  -MOLST form completed and in chart. Pt is DNR/DNI  -Hospice referral

## 2020-09-04 NOTE — PROGRESS NOTE ADULT - SUBJECTIVE AND OBJECTIVE BOX
Patient is a 55y old  Female who presents with a chief complaint of Altered mental status (03 Sep 2020 16:13)      SUBJECTIVE / OVERNIGHT EVENTS:    Patient seen and examined. co abd pain despite recent paracentesis. states she has been taking dilaudid around the clock with no improvement       Vital Signs Last 24 Hrs  T(C): 36.6 (04 Sep 2020 09:06), Max: 37.3 (04 Sep 2020 00:50)  T(F): 97.8 (04 Sep 2020 09:06), Max: 99.1 (04 Sep 2020 00:50)  HR: 101 (04 Sep 2020 09:06) (97 - 105)  BP: 110/76 (04 Sep 2020 09:06) (100/60 - 113/78)  BP(mean): --  RR: 18 (04 Sep 2020 09:06) (18 - 18)  SpO2: 100% (04 Sep 2020 09:06) (96% - 100%)  I&O's Summary    03 Sep 2020 07:01  -  04 Sep 2020 07:00  --------------------------------------------------------  IN: 880 mL / OUT: 450 mL / NET: 430 mL    04 Sep 2020 07:01  -  04 Sep 2020 12:06  --------------------------------------------------------  IN: 0 mL / OUT: 600 mL / NET: -600 mL        PE:  GENERAL: NAD, AAOx3, older than stated age  HEAD:  Atraumatic, Normocephalic  CHEST/LUNG: CTABL, No wheeze  HEART: Regular rate and rhythm; no murmur  ABDOMEN: TTP, less distended, Bowel sounds present  EXTREMITIES:  2+ Peripheral Pulses, No clubbing, cyanosis, or edema  SKIN: No rashes or lesions  NEURO: No focal deficits    LABS:                        10.4   3.66  )-----------( 12       ( 04 Sep 2020 07:14 )             31.5     09-04    121<L>  |  82<L>  |  18  ----------------------------<  114<H>  4.1   |  25  |  0.53    Ca    7.4<L>      04 Sep 2020 10:33  Phos  1.4     09-04  Mg     1.0     09-04    TPro  2.6<L>  /  Alb  1.3<L>  /  TBili  3.9<H>  /  DBili  x   /  AST  349<H>  /  ALT  96<H>  /  AlkPhos  653<H>  09-04      CAPILLARY BLOOD GLUCOSE                RADIOLOGY & ADDITIONAL TESTS:    Imaging Personally Reviewed:  [x] YES  [ ] NO    Consultant(s) Notes Reviewed:  [x] YES  [ ] NO    MEDICATIONS  (STANDING):  chlorhexidine 4% Liquid 1 Application(s) Topical daily  fentaNYL   Patch  25 MICROgram(s)/Hr. 1 Patch Transdermal every 48 hours  furosemide   Injectable 40 milliGRAM(s) IV Push daily  lactated ringers. 500 milliLiter(s) (100 mL/Hr) IV Continuous <Continuous>  magnesium sulfate  IVPB 2 Gram(s) IV Intermittent once  pantoprazole    Tablet 40 milliGRAM(s) Oral before breakfast  piperacillin/tazobactam IVPB.. 3.375 Gram(s) IV Intermittent every 8 hours  sodium chloride 0.9%. 1000 milliLiter(s) (50 mL/Hr) IV Continuous <Continuous>    MEDICATIONS  (PRN):  HYDROmorphone  Injectable 1 milliGRAM(s) IV Push every 3 hours PRN Severe Pain (7 - 10)  LORazepam     Tablet 0.5 milliGRAM(s) Oral at bedtime PRN Anxiety  ondansetron Injectable 4 milliGRAM(s) IV Push every 6 hours PRN Nausea and/or Vomiting      Care Discussed with Consultants/Other Providers [x] YES  [ ] NO    HEALTH ISSUES - PROBLEM Dx:  Hypophosphatemia: Hypophosphatemia  Hyponatremia: Hyponatremia  Dyspnea: Dyspnea  Palliative care encounter: Palliative care encounter  Advanced directives, counseling/discussion: Advanced directives, counseling/discussion  Carcinoma of breast metastatic to multiple sites, unspecified laterality: Carcinoma of breast metastatic to multiple sites, unspecified laterality  Generalized abdominal pain: Generalized abdominal pain  Rib pain: Rib pain  Pain of metastatic malignancy: Pain of metastatic malignancy  Breast cancer metastasized to liver: Breast cancer metastasized to liver  Prophylactic measure: Prophylactic measure  Gastroesophageal reflux disease, esophagitis presence not specified: Gastroesophageal reflux disease, esophagitis presence not specified  Malignant ascites: Malignant ascites  Transaminitis: Transaminitis  Sepsis, due to unspecified organism, unspecified whether acute organ dysfunction present: Sepsis, due to unspecified organism, unspecified whether acute organ dysfunction present  Malignant neoplasm of female breast, unspecified estrogen receptor status, unspecified laterality, unspecified site of breast: Malignant neoplasm of female breast, unspecified estrogen receptor status, unspecified laterality, unspecified site of breast  Acute encephalopathy: Acute encephalopathy  Suspected deep vein thrombosis (DVT)  Suspected pulmonary embolism

## 2020-09-04 NOTE — PROGRESS NOTE ADULT - SUBJECTIVE AND OBJECTIVE BOX
SUBJECTIVE AND OBJECTIVE:  INTERVAL HPI/OVERNIGHT EVENTS:  pt s/p para yesterday evening. She mentions slight improvement in her dyspnea and pain. Her abdominal pain has become more crampy and less pressure. She mentions her current pain regimen is working for her, she has received 3 PRNs of IV Dilaudid 1mg and 1 dose of Dilaudid PO 4mg in 24 hours.  She is tolerating PO.    DNR on chart:   Allergies    Crestor (Joint Pain; Other)  HMG-CoA reductase inhibitors (Unknown)  Lipitor (Rash)  statins (Joint Pain; Rash)  Zocor (Joint Pain; Other)    Intolerances    MEDICATIONS  (STANDING):  chlorhexidine 4% Liquid 1 Application(s) Topical daily  fentaNYL   Patch  25 MICROgram(s)/Hr. 1 Patch Transdermal every 48 hours  furosemide   Injectable 40 milliGRAM(s) IV Push daily  magnesium sulfate  IVPB 2 Gram(s) IV Intermittent once  pantoprazole    Tablet 40 milliGRAM(s) Oral before breakfast  piperacillin/tazobactam IVPB.. 3.375 Gram(s) IV Intermittent every 8 hours    MEDICATIONS  (PRN):  HYDROmorphone  Injectable 1 milliGRAM(s) IV Push every 3 hours PRN Severe Pain (7 - 10)  LORazepam     Tablet 0.5 milliGRAM(s) Oral at bedtime PRN Anxiety  ondansetron Injectable 4 milliGRAM(s) IV Push every 6 hours PRN Nausea and/or Vomiting      ITEMS UNCHECKED ARE NOT PRESENT    PRESENT SYMPTOMS: [ ]Unable to obtain due to poor mentation   Source if other than patient:  [ ]Family   [ ]Team     Pain:  [X ]yes [ ]no  QOL impact - Difficulty with resting  Location -          Abdomen          Aggravating factors - Movement  Quality - Crampy  Radiation - None  Timing- Comes and goes  Severity (0-10 scale): 8/10  Minimal acceptable level (0-10 scale):     Dyspnea:                           [X ]Mild [ ]Moderate [ ]Severe  Anxiety:                             [ ]Mild [ ]Moderate [ ]Severe  Fatigue:                             [ ]Mild [ ]Moderate [ ]Severe  Nausea:                             [ ]Mild [ ]Moderate [ ]Severe  Loss of appetite:              [X ]Mild [ ]Moderate [ ]Severe  Constipation:                    [X ]Mild [ ]Moderate [ ]Severe    CPOT:    https://www.[facility].org/getattachment/[email or web address]/Critical-Care-Pain-Observation-Tool-(CPOT)    PAIN AD Score:	  http://geriatrictoolkit.[facility]/cog/painad.pdf (Ctrl + left click to view)    Other Symptoms:  [ ]All other review of systems negative     Palliative Performance Status Version 2:   40%      http://[facility].org/files/news/palliative_performance_scale_ppsv2.pdf    PHYSICAL EXAM:  Vital Signs Last 24 Hrs  T(C): 36.6 (04 Sep 2020 09:06), Max: 37.3 (04 Sep 2020 00:50)  T(F): 97.8 (04 Sep 2020 09:06), Max: 99.1 (04 Sep 2020 00:50)  HR: 101 (04 Sep 2020 09:06) (97 - 105)  BP: 110/76 (04 Sep 2020 09:06) (100/60 - 113/78)  RR: 18 (04 Sep 2020 09:06) (18 - 18)  SpO2: 100% (04 Sep 2020 09:06) (96% - 100%) I&O's Summary    03 Sep 2020 07:01  -  04 Sep 2020 07:00  --------------------------------------------------------  IN: 880 mL / OUT: 450 mL / NET: 430 mL    04 Sep 2020 07:01  -  04 Sep 2020 12:32  --------------------------------------------------------  IN: 0 mL / OUT: 600 mL / NET: -600 mL       GENERAL:  [X ]Alert  [X ]Oriented x3   [ ]Lethargic  [ ]Cachexia  [ ]Unarousable  [ X]Verbal  [ ]Non-Verbal  Behavioral:   [ ]Anxiety  [ ]Delirium [ ]Agitation [ ]Other  HEENT:  [ X]Normal   [ ]Dry mouth   [ ]ET Tube/Trach  [ ]Oral lesions  PULMONARY:   [X ]Clear [ ]Tachypnea  [ ]Audible excessive secretions   [ ]Rhonchi        [ ]Right [ ]Left [ ]Bilateral  [ ]Crackles        [ ]Right [ ]Left [ ]Bilateral  [ ]Wheezing     [ ]Right [ ]Left [ ]Bilateral  [ ]Diminished BS [ ] Right [ ]Left [ ]Bilateral  CARDIOVASCULAR:    [X ]Regular [ ]Irregular [ ]Tachy  [ ]Colin [ ]Murmur [ ]Other  GASTROINTESTINAL:  [X ]Soft  [ ]Distended   [ ]+BS  [X ]Non tender [ ]Tender  [ ]PEG [ ]OGT/ NGT   Last BM: 08/31/2020     GENITOURINARY:  [X ]Normal [ ]Incontinent   [ ]Oliguria/Anuria   [ ]Ng  MUSCULOSKELETAL:   [ ]Normal   [X ]Weakness  [ ]Bed/Wheelchair bound [ ]Edema  NEUROLOGIC:   [ X]No focal deficits  [ ] Cognitive impairment  [ ] Dysphagia [ ]Dysarthria [ ] Paresis [ ]Other   SKIN:   [X]Normal  [ ]Rash   [ ]Pressure ulcer(s) [ ]y [ ]n present on admission    CRITICAL CARE:  [ ]Shock Present  [ ]Septic [ ]Cardiogenic [ ]Neurologic [ ]Hypovolemic  [ ]Vasopressors [ ]Inotropes  [ ]Respiratory failure present [ ]Mechanical Ventilation [ ]Non-invasive ventilatory support [ ]High-Flow  [ ]Acute  [ ]Chronic [ ]Hypoxic  [ ]Hypercarbic [ ]Other  [ ]Other organ failure     LABS:                        10.4   3.66  )-----------( 12       ( 04 Sep 2020 07:14 )             31.5   09-04    121<L>  |  82<L>  |  18  ----------------------------<  114<H>  4.1   |  25  |  0.53    Ca    7.4<L>      04 Sep 2020 10:33  Phos  1.4     09-04  Mg     1.0     09-04    TPro  2.6<L>  /  Alb  1.3<L>  /  TBili  3.9<H>  /  DBili  x   /  AST  349<H>  /  ALT  96<H>  /  AlkPhos  653<H>  09-04        RADIOLOGY & ADDITIONAL STUDIES:    Protein Calorie Malnutrition Present: [ ]mild [ ]moderate [ ]severe [ ]underweight [ ]morbid obesity  https://www.andeal.org/vault/5334/web/files/ONC/Table_Clinical%20Characteristics%20to%20Document%20Malnutrition-White%20JV%20et%20al%202012.pdf    Height (cm): 154.9 (08-30-20 @ 03:36), 154.9 (07-02-20 @ 20:00), 154.94 (05-28-20 @ 09:16)  Weight (kg): 65.7 (08-30-20 @ 03:36), 76.7 (07-02-20 @ 20:00), 72.6 (05-28-20 @ 09:16)  BMI (kg/m2): 27.4 (08-30-20 @ 03:36), 32 (07-02-20 @ 20:00), 30.2 (05-28-20 @ 09:16)    [ ]PPSV2 < or = 30%  [ ]significant weight loss [ ]poor nutritional intake [ ]anasarca   Albumin, Serum: 1.3 g/dL (09-04-20 @ 07:14)   [ ]Artificial Nutrition    REFERRALS:   [ ]Chaplaincy  [ ]Hospice  [ ]Child Life  [ ]Social Work  [ ]Case management [ ]Holistic Therapy     Goals of Care Document:      ______________    Tyron Nance M.D.  Hospice and Palliative Medicine Fellow  Pager [phone] SUBJECTIVE AND OBJECTIVE:  INTERVAL HPI/OVERNIGHT EVENTS:  pt s/p para yesterday evening. She mentions slight improvement in her dyspnea and pain. Her abdominal pain has become more crampy and less pressure. She mentions her current pain regimen is working for her, she has received 3 PRNs of IV Dilaudid 1mg and 1 dose of Dilaudid PO 4mg in 24 hours.  She is tolerating PO.    DNR on chart:   Allergies    Crestor (Joint Pain; Other)  HMG-CoA reductase inhibitors (Unknown)  Lipitor (Rash)  statins (Joint Pain; Rash)  Zocor (Joint Pain; Other)    Intolerances    MEDICATIONS  (STANDING):  chlorhexidine 4% Liquid 1 Application(s) Topical daily  fentaNYL   Patch  25 MICROgram(s)/Hr. 1 Patch Transdermal every 48 hours  furosemide   Injectable 40 milliGRAM(s) IV Push daily  magnesium sulfate  IVPB 2 Gram(s) IV Intermittent once  pantoprazole    Tablet 40 milliGRAM(s) Oral before breakfast  piperacillin/tazobactam IVPB.. 3.375 Gram(s) IV Intermittent every 8 hours    MEDICATIONS  (PRN):  HYDROmorphone  Injectable 1 milliGRAM(s) IV Push every 3 hours PRN Severe Pain (7 - 10)  LORazepam     Tablet 0.5 milliGRAM(s) Oral at bedtime PRN Anxiety  ondansetron Injectable 4 milliGRAM(s) IV Push every 6 hours PRN Nausea and/or Vomiting      ITEMS UNCHECKED ARE NOT PRESENT    PRESENT SYMPTOMS: [ ]Unable to obtain due to poor mentation   Source if other than patient:  [ ]Family   [ ]Team     Pain:  [X ]yes [ ]no  QOL impact - Difficulty with resting  Location -          Abdomen          Aggravating factors - Movement  Quality - Crampy  Radiation - None  Timing- Comes and goes  Severity (0-10 scale): 8/10  Minimal acceptable level (0-10 scale):     Dyspnea:                           [X ]Mild [ ]Moderate [ ]Severe  Anxiety:                             [ ]Mild [ ]Moderate [ ]Severe  Fatigue:                             [ ]Mild [ ]Moderate [ ]Severe  Nausea:                             [ ]Mild [ ]Moderate [ ]Severe  Loss of appetite:              [X ]Mild [ ]Moderate [ ]Severe  Constipation:                    [X ]Mild [ ]Moderate [ ]Severe    CPOT:    https://www.[facility].org/getattachment/[email or web address]/Critical-Care-Pain-Observation-Tool-(CPOT)    PAIN AD Score:	  http://geriatrictoolkit.[facility]/cog/painad.pdf (Ctrl + left click to view)    Other Symptoms:  [ ]All other review of systems negative     Palliative Performance Status Version 2:   40%      http://[facility].org/files/news/palliative_performance_scale_[email or web address].pdf    PHYSICAL EXAM:  Vital Signs Last 24 Hrs  T(C): 36.6 (04 Sep 2020 09:06), Max: 37.3 (04 Sep 2020 00:50)  T(F): 97.8 (04 Sep 2020 09:06), Max: 99.1 (04 Sep 2020 00:50)  HR: 101 (04 Sep 2020 09:06) (97 - 105)  BP: 110/76 (04 Sep 2020 09:06) (100/60 - 113/78)  RR: 18 (04 Sep 2020 09:06) (18 - 18)  SpO2: 100% (04 Sep 2020 09:06) (96% - 100%) I&O's Summary    03 Sep 2020 07:01  -  04 Sep 2020 07:00  --------------------------------------------------------  IN: 880 mL / OUT: 450 mL / NET: 430 mL    04 Sep 2020 07:01  -  04 Sep 2020 12:32  --------------------------------------------------------  IN: 0 mL / OUT: 600 mL / NET: -600 mL       GENERAL:  [X ]Alert  [X ]Oriented x3   [ ]Lethargic  [ ]Cachexia  [ ]Unarousable  [ X]Verbal  [ ]Non-Verbal  Behavioral:   [ ]Anxiety  [ ]Delirium [ ]Agitation [ ]Other  HEENT:  [ X]Normal   [ ]Dry mouth   [ ]ET Tube/Trach  [ ]Oral lesions  PULMONARY:   [X ]Clear [ ]Tachypnea  [ ]Audible excessive secretions   [ ]Rhonchi        [ ]Right [ ]Left [ ]Bilateral  [ ]Crackles        [ ]Right [ ]Left [ ]Bilateral  [ ]Wheezing     [ ]Right [ ]Left [ ]Bilateral  [ ]Diminished BS [ ] Right [ ]Left [ ]Bilateral  CARDIOVASCULAR:    [X ]Regular [ ]Irregular [ ]Tachy  [ ]Colin [ ]Murmur [ ]Other  GASTROINTESTINAL:  [X ]Soft  [ ]Distended   [ ]+BS  [X ]Non tender [ ]Tender  [ ]PEG [ ]OGT/ NGT   Last BM: 08/31/2020     GENITOURINARY:  [X ]Normal [ ]Incontinent   [ ]Oliguria/Anuria   [ ]Ng  MUSCULOSKELETAL:   [ ]Normal   [X ]Weakness  [ ]Bed/Wheelchair bound [ ]Edema  NEUROLOGIC:   [ X]No focal deficits  [ ] Cognitive impairment  [ ] Dysphagia [ ]Dysarthria [ ] Paresis [ ]Other   SKIN:   [X]Normal  [ ]Rash   [ ]Pressure ulcer(s) [ ]y [ ]n present on admission    CRITICAL CARE:  [ ]Shock Present  [ ]Septic [ ]Cardiogenic [ ]Neurologic [ ]Hypovolemic  [ ]Vasopressors [ ]Inotropes  [ ]Respiratory failure present [ ]Mechanical Ventilation [ ]Non-invasive ventilatory support [ ]High-Flow  [ ]Acute  [ ]Chronic [ ]Hypoxic  [ ]Hypercarbic [ ]Other  [ ]Other organ failure     LABS:                        10.4   3.66  )-----------( 12       ( 04 Sep 2020 07:14 )             31.5   09-04    121<L>  |  82<L>  |  18  ----------------------------<  114<H>  4.1   |  25  |  0.53    Ca    7.4<L>      04 Sep 2020 10:33  Phos  1.4     09-04  Mg     1.0     09-04    TPro  2.6<L>  /  Alb  1.3<L>  /  TBili  3.9<H>  /  DBili  x   /  AST  349<H>  /  ALT  96<H>  /  AlkPhos  653<H>  09-04        RADIOLOGY & ADDITIONAL STUDIES:    Protein Calorie Malnutrition Present: [ ]mild [ ]moderate [ ]severe [ ]underweight [ ]morbid obesity  https://www.andeal.org/vault/4457/web/files/ONC/Table_Clinical%20Characteristics%20to%20Document%20Malnutrition-White%20JV%20et%20al%202012.pdf    Height (cm): 154.9 (08-30-20 @ 03:36), 154.9 (07-02-20 @ 20:00), 154.94 (05-28-20 @ 09:16)  Weight (kg): 65.7 (08-30-20 @ 03:36), 76.7 (07-02-20 @ 20:00), 72.6 (05-28-20 @ 09:16)  BMI (kg/m2): 27.4 (08-30-20 @ 03:36), 32 (07-02-20 @ 20:00), 30.2 (05-28-20 @ 09:16)    [ ]PPSV2 < or = 30%  [ ]significant weight loss [ ]poor nutritional intake [ ]anasarca   Albumin, Serum: 1.3 g/dL (09-04-20 @ 07:14)   [ ]Artificial Nutrition    REFERRALS:   [ ]Chaplaincy  [X]Hospice  [ ]Child Life  [ X]Social Work  [ ]Case management [ ]Holistic Therapy     Goals of Care Document:  Below    ______________    Tyron Nance M.D.  Hospice and Palliative Medicine Fellow  [phone] SUBJECTIVE AND OBJECTIVE: received multiple doses of PRN dilaudid, is s/p therapeutic and diagnostic paracentesis  INTERVAL HPI/OVERNIGHT EVENTS:  pt s/p para yesterday evening. She mentions slight improvement in her dyspnea and pain. Her abdominal pain has become more crampy and less pressure. She mentions her current pain regimen is working for her, she has received 3 PRNs of IV Dilaudid 1mg and 1 dose of Dilaudid PO 4mg in 24 hours.  She is tolerating PO.    DNR on chart:   Allergies    Crestor (Joint Pain; Other)  HMG-CoA reductase inhibitors (Unknown)  Lipitor (Rash)  statins (Joint Pain; Rash)  Zocor (Joint Pain; Other)    Intolerances    MEDICATIONS  (STANDING):  chlorhexidine 4% Liquid 1 Application(s) Topical daily  fentaNYL   Patch  25 MICROgram(s)/Hr. 1 Patch Transdermal every 48 hours  furosemide   Injectable 40 milliGRAM(s) IV Push daily  magnesium sulfate  IVPB 2 Gram(s) IV Intermittent once  pantoprazole    Tablet 40 milliGRAM(s) Oral before breakfast  piperacillin/tazobactam IVPB.. 3.375 Gram(s) IV Intermittent every 8 hours    MEDICATIONS  (PRN):  HYDROmorphone  Injectable 1 milliGRAM(s) IV Push every 3 hours PRN Severe Pain (7 - 10)  LORazepam     Tablet 0.5 milliGRAM(s) Oral at bedtime PRN Anxiety  ondansetron Injectable 4 milliGRAM(s) IV Push every 6 hours PRN Nausea and/or Vomiting      ITEMS UNCHECKED ARE NOT PRESENT    PRESENT SYMPTOMS: [ ]Unable to obtain due to poor mentation   Source if other than patient:  [ ]Family   [ ]Team     Pain:  [X ]yes [ ]no  QOL impact - Difficulty with resting  Location -          Abdomen          Aggravating factors - Movement  Quality - Crampy  Radiation - None  Timing- Comes and goes  Severity (0-10 scale): 8/10  Minimal acceptable level (0-10 scale):     Dyspnea:                           [X ]Mild [ ]Moderate [ ]Severe  Anxiety:                             [ ]Mild [ ]Moderate [ ]Severe  Fatigue:                             [ ]Mild [ ]Moderate [ ]Severe  Nausea:                             [ ]Mild [ ]Moderate [ ]Severe  Loss of appetite:              [X ]Mild [ ]Moderate [ ]Severe  Constipation:                    [X ]Mild [ ]Moderate [ ]Severe    CPOT:    https://www.[facility].org/getattachment/[email or web address]/Critical-Care-Pain-Observation-Tool-(CPOT)    PAIN AD Score:	  http://geriatrictoolkit.[facility]/cog/painad.pdf (Ctrl + left click to view)    Other Symptoms:  [ ]All other review of systems negative     Palliative Performance Status Version 2:   40%      http://[facility].org/files/news/palliative_performance_scale_ppsv2.pdf    PHYSICAL EXAM:  Vital Signs Last 24 Hrs  T(C): 36.6 (04 Sep 2020 09:06), Max: 37.3 (04 Sep 2020 00:50)  T(F): 97.8 (04 Sep 2020 09:06), Max: 99.1 (04 Sep 2020 00:50)  HR: 101 (04 Sep 2020 09:06) (97 - 105)  BP: 110/76 (04 Sep 2020 09:06) (100/60 - 113/78)  RR: 18 (04 Sep 2020 09:06) (18 - 18)  SpO2: 100% (04 Sep 2020 09:06) (96% - 100%) I&O's Summary    03 Sep 2020 07:01  -  04 Sep 2020 07:00  --------------------------------------------------------  IN: 880 mL / OUT: 450 mL / NET: 430 mL    04 Sep 2020 07:01  -  04 Sep 2020 12:32  --------------------------------------------------------  IN: 0 mL / OUT: 600 mL / NET: -600 mL     GENERAL:  [X ]Alert  [X ]Oriented x3   [ ]Lethargic  [ ]Cachexia  [ ]Unarousable  [ X]Verbal  [ ]Non-Verbal  Behavioral:   [ ]Anxiety  [ ]Delirium [ ]Agitation [ ]Other  HEENT:  [ X]Normal   [ ]Dry mouth   [ ]ET Tube/Trach  [ ]Oral lesions  PULMONARY:   [X ]Clear [ ]Tachypnea  [ ]Audible excessive secretions   [ ]Rhonchi        [ ]Right [ ]Left [ ]Bilateral  [ ]Crackles        [ ]Right [ ]Left [ ]Bilateral  [ ]Wheezing     [ ]Right [ ]Left [ ]Bilateral  [ ]Diminished BS [ ] Right [ ]Left [ ]Bilateral  CARDIOVASCULAR:    [X ]Regular [ ]Irregular [ ]Tachy  [ ]Colin [ ]Murmur [ ]Other  GASTROINTESTINAL:  [X ]Soft  [ ]Distended   [ ]+BS  [X ]Non tender [ ]Tender  [ ]PEG [ ]OGT/ NGT   Last BM: 08/31/2020  GENITOURINARY:  [X ]Normal [ ]Incontinent   [ ]Oliguria/Anuria   [ ]Ng  MUSCULOSKELETAL:   [ ]Normal   [X ]Weakness  [ ]Bed/Wheelchair bound [ ]Edema  NEUROLOGIC:   [ X]No focal deficits  [ ] Cognitive impairment  [ ] Dysphagia [ ]Dysarthria [ ] Paresis [ ]Other   SKIN:   [X]Normal  [ ]Rash   [ ]Pressure ulcer(s) [ ]y [ ]n present on admission    CRITICAL CARE:  [ ]Shock Present  [ ]Septic [ ]Cardiogenic [ ]Neurologic [ ]Hypovolemic  [ ]Vasopressors [ ]Inotropes  [ ]Respiratory failure present [ ]Mechanical Ventilation [ ]Non-invasive ventilatory support [ ]High-Flow  [ ]Acute  [ ]Chronic [ ]Hypoxic  [ ]Hypercarbic [ ]Other  [ ]Other organ failure     LABS:                        10.4   3.66  )-----------( 12       ( 04 Sep 2020 07:14 )             31.5   09-04    121<L>  |  82<L>  |  18  ----------------------------<  114<H>  4.1   |  25  |  0.53    Ca    7.4<L>      04 Sep 2020 10:33  Phos  1.4     09-04  Mg     1.0     09-04    TPro  2.6<L>  /  Alb  1.3<L>  /  TBili  3.9<H>  /  DBili  x   /  AST  349<H>  /  ALT  96<H>  /  AlkPhos  653<H>  09-04        RADIOLOGY & ADDITIONAL STUDIES:  < from: CT Chest w/ IV Cont (08.29.20 @ 20:41) >    EXAM:  CT ABDOMEN AND PELVIS IC                          EXAM:  CT CHEST IC                            PROCEDURE DATE:  08/29/2020            INTERPRETATION:  CLINICAL INFORMATION: Altered mental status, metastatic breast cancer    COMPARISON: CT abdomen and pelvis from 7/2/2020, CT chest abdomen and pelvis from 5/20/2020    PROCEDURE:  CT of the Chest, Abdomen and Pelvis was performed with intravenous contrast.  Intravenous contrast: 90 ml Omnipaque 350. 10 ml discarded.  Oral contrast: None.  Sagittal and coronal reformats were performed.    FINDINGS:  CHEST:  LUNGS AND LARGE AIRWAYS: Patent central airways. Interval enlargement of all pleural-based nodules, for reference there is a 1.1 cm left lower lobe pleural-based nodule (2:40), previously measuring 6 mm. There is a new left upper lobe pulmonary nodule measuring 1.1 cm (2:26). Unchanged mild peripheral reticulation in the lingula.  PLEURA: Unchanged small loculated left pleural effusion with unchanged pleural thickening and enhancement.  VESSELS: No abdominal aortic aneurysm or dissection..  HEART: Heart size is normal. Small pericardial effusion again seen.  MEDIASTINUM AND ANA MARIA: No lymphadenopathy.  CHEST WALL AND LOWER NECK: Metallic surgical clip within the left axilla and right breast. Right chest wall port with tip in the distal SVC.    ABDOMEN AND PELVIS:  LIVER: Hepatomegaly. Again noted are extensive patchy areas of decreased attenuation throughout both lobes of the liver without vascular displacement. This appears overall unchanged from similar prior study on 7/2/2020. Unchanged nodular contour of the liver.  BILE DUCTS: Normal caliber.  GALLBLADDER: Cholelithiasis.  SPLEEN: Within normal limits.  PANCREAS: Within normal limits.  ADRENALS: Within normal limits.  KIDNEYS/URETERS: Enhance symmetrically without hydronephrosis..    BLADDER/REPRODUCTIVE ORGANS: Not well visualized secondary to extensive metallic streak artifact in the pelvis.    BOWEL: Evaluation of bowel limited without distention with oral contrast, however there is no bowel obstruction. Appendix is normal. Small bowel loops are not dilated.  PERITONEUM: Unchanged moderate volume ascites.  VESSELS: Aortic calcifications.  RETROPERITONEUM/LYMPH NODES: Unchanged 8 mm right retroperitonealnodule (2:101).  ABDOMINAL WALL: Tiny fat-containing umbilical hernia.  BONES: Status post bilateral total hip arthroplasties. Redemonstration of sclerotic lesions throughout the spine ribs, sternum, left humerus, and bony pelvis, compatible with metastatic disease. Multiple chronic rib fractures are again seen.    IMPRESSION:  Interval enlargement of all pleural based nodules with additional new left upper lobe pulmonary nodule, compatible with progression of metastatic disease.      Unchanged small loculated left pleural effusion with unchanged pleural thickening and enhancement.    Redemonstration of diffuse osseous metastatic disease.    Unchanged patchy low-density throughout the liver, likely hepatic steatosis, however focal hepatic lesion cannot be excluded. Contrast-enhanced liver MRI may be obtained for further evaluation as clinically warranted.              THALIA GIBBS M.D., RADIOLOGY RESIDENT  This document has been electronically signed.  RAYNA AVILA M.D., ATTENDING RADIOLOGIST  This document has been electronically signed. Aug 30 2020 12:44AM                < end of copied text >      Protein Calorie Malnutrition Present: [ ]mild [ ]moderate [ ]severe [ ]underweight [ ]morbid obesity  https://www.andeal.org/vault/2440/web/files/ONC/Table_Clinical%20Characteristics%20to%20Document%20Malnutrition-White%20JV%20et%20al%202012.pdf    Height (cm): 154.9 (08-30-20 @ 03:36), 154.9 (07-02-20 @ 20:00), 154.94 (05-28-20 @ 09:16)  Weight (kg): 65.7 (08-30-20 @ 03:36), 76.7 (07-02-20 @ 20:00), 72.6 (05-28-20 @ 09:16)  BMI (kg/m2): 27.4 (08-30-20 @ 03:36), 32 (07-02-20 @ 20:00), 30.2 (05-28-20 @ 09:16)    [ ]PPSV2 < or = 30%  [ x]significant weight loss [x ]poor nutritional intake [ ]anasarca   Albumin, Serum: 1.3 g/dL (09-04-20 @ 07:14)   [ ]Artificial Nutrition    REFERRALS:   [ ]Chaplaincy  [X]Hospice  [ ]Child Life  [ X]Social Work  [ ]Case management [ ]Holistic Therapy     Goals of Care Document:  Below    ______________    Tyron Nance M.D.  Hospice and Palliative Medicine Fellow  [phone]

## 2020-09-04 NOTE — PROGRESS NOTE ADULT - ASSESSMENT
55 year old female pt with PMHx of Breast Ca (dx 2005 s/p mastectomy chemo/ 10 years of tamoxifen, leupron, and found to have  mets to liver, lung, bone in 2017, last dose Cytoxan was 3 weeks ago), GERD p/w AMS most likely 2/2 to increase in opioid dosage. There is low suspicion for infection, however still pending para to r/o SBP. 55 year old female pt with PMHx of Breast Ca (dx 2005 s/p mastectomy chemo/ 10 years of tamoxifen, leupron, and found to have  mets to liver, lung, bone in 2017, last dose Cytoxan was 3 weeks ago), GERD p/w AMS most likely 2/2 to increase in opioid dosage. There is low suspicion for infection. Palliative following for GOC/Pain management.

## 2020-09-04 NOTE — PROGRESS NOTE ADULT - PROBLEM SELECTOR PLAN 3
-Slight improvement s/p para  -Will benefit from pain medications as well -Slight improvement s/p para  -Should improve with PCA, however will re-assess

## 2020-09-05 NOTE — PROGRESS NOTE ADULT - PROBLEM SELECTOR PLAN 6
Hospice referral    Thank you for allowing us to participate in your patient's care. We will continue to follow with you. Please page [phone] or contact us via Microsoft Teams for any q's or c's.     Sebastián Juarez  Palliative Medicine

## 2020-09-05 NOTE — CHART NOTE - NSCHARTNOTEFT_GEN_A_CORE
Informed by RN of critical value as follow:                        8.0    2.61  )-----------( 12       ( 05 Sep 2020 07:04 )               23.8     09-05    125<L>  |  84<L>  |  15  ----------------------------<  80  3.4<L>   |  25  |  0.40<L>    Ca    7.5<L>      05 Sep 2020 07:04  Phos  1.4     09-05  Mg     2.2     09-05    TPro  4.6<L>  /  Alb  3.3  /  TBili  8.5<H>  /  DBili  x   /  AST  663<H>  /  ALT  136<H>  /  AlkPhos  732<H>  09-05      54yo female pt with PMHx of Breast Ca (dx 2005 with mets to liver, lung, bone, last dose Cytoxan was 3 weeks ago), GERD p/w AMS. Pt states she does remember why her brother brought her to the hospital but thinks he was concerned. Pt does endorse doubling her dose of dilaudid 2 days ago to better help control bone pain from mets.    Patient has been thrombocytopenic since admission most likely 2/2 recent chemotherapy; S/p 1u platelet on 9/2 with minimal response.  In addition patient has been having varying electrolyte imbalance in which supplements are being given prn.    P/E:      INCOMPLETE Informed by RN of critical value as follow:                        8.0    2.61  )-----------( 12       ( 05 Sep 2020 07:04 )               23.8     09-05    125<L>  |  84<L>  |  15  ----------------------------<  80  3.4<L>   |  25  |  0.40<L>    Ca    7.5<L>      05 Sep 2020 07:04  Phos  1.4     09-05  Mg     2.2     09-05    TPro  4.6<L>  /  Alb  3.3  /  TBili  8.5<H>  /  DBili  x   /  AST  663<H>  /  ALT  136<H>  /  AlkPhos  732<H>  09-05      56yo female pt with PMHx of Breast Ca (dx 2005 with mets to liver, lung, bone, last dose Cytoxan was 3 weeks ago), GERD p/w AMS. Pt states she does remember why her brother brought her to the hospital but thinks he was concerned. Pt does endorse doubling her dose of dilaudid 2 days ago to better help control bone pain from mets.    Patient has been thrombocytopenic since admission most likely 2/2 recent chemotherapy; S/p 1u platelet on 9/2 with minimal response.  In addition patient has been having varying electrolyte imbalance in which supplements are being given prn.    P/E: AA&O x3, conversant & appropriate, no focal neuro deficits, LCTA on supplemental oxygen, S1S2 mild sinus tachycardia (low 100's intermittently)    A/P: Metastatic breast ca, thrombocytopenia, Electrolyte imbalance; will recheck plts in am, electrolytes supplemented prn    D/w Dr Cameron MADSEN [place] [address]

## 2020-09-05 NOTE — PROGRESS NOTE ADULT - SUBJECTIVE AND OBJECTIVE BOX
[facility] : Progress Note :: [phone], (office [phone]),   Dr De La Cruz / Dr Contreras / Dr Medina / Dr Montemayor / Dr Amanda VEGA / Dr Fowler / Dr Casas / Dr Tommy patel  _____________________________________________________________________________________________    pain better controlled.  SNA improving    Crestor (Joint Pain; Other)  HMG-CoA reductase inhibitors (Unknown)  Lipitor (Rash)  statins (Joint Pain; Rash)  Zocor (Joint Pain; Other)    Hospital Medications:   MEDICATIONS  (STANDING):  albumin human 25% IVPB 100 milliLiter(s) IV Intermittent every 6 hours  chlorhexidine 4% Liquid 1 Application(s) Topical daily  furosemide   Injectable 40 milliGRAM(s) IV Push daily  HYDROmorphone PCA (1 mG/mL) 30 milliLiter(s) PCA Continuous PCA Continuous  pantoprazole    Tablet 40 milliGRAM(s) Oral before breakfast  piperacillin/tazobactam IVPB.. 3.375 Gram(s) IV Intermittent every 8 hours  polyethylene glycol 3350 17 Gram(s) Oral daily  potassium chloride   Solution 40 milliEquivalent(s) Oral once  senna 2 Tablet(s) Oral at bedtime  sodium chloride 0.9% lock flush 3 milliLiter(s) IV Push every 8 hours        VITALS:  T(F): 98.1 (20 @ 10:04), Max: 98.4 (20 @ 02:00)  HR: 106 (20 @ 10:04)  BP: 90/58 (20 @ 10:04)  RR: 18 (20 @ 10:04)  SpO2: 97% (20 @ 10:04)  Wt(kg): --     @ 07:01  -   @ 07:00  --------------------------------------------------------  IN: 995 mL / OUT: 1600 mL / NET: -605 mL        PHYSICAL EXAM:  Constitutional: NAD  HEENT: anicteric sclera, oropharynx clear.  Neck: No JVD  Respiratory: CTAB, no wheezes, rales or rhonchi  Cardiovascular: S1, S2, RRR  Gastrointestinal: ascites++  Extremities:  No peripheral edema  Neurological: A/O x 3, no focal deficits  Psychiatric: Normal mood, normal affect  : No CVA tenderness. No gomez.       LABS:      125<L>  |  84<L>  |  15  ----------------------------<  80  3.4<L>   |  25  |  0.40<L>    Ca    7.5<L>      05 Sep 2020 07:04  Phos  1.4       Mg     2.2         TPro  4.6<L>  /  Alb  3.3  /  TBili  8.5<H>  /  DBili      /  AST  663<H>  /  ALT  136<H>  /  AlkPhos  732<H>      Creatinine Trend: 0.40 <--, 0.53 <--, 0.37 <--, 0.41 <--, 0.47 <--, 0.48 <--, 0.45 <--, 0.53 <--, 0.48 <--                        8.0    2.61  )-----------(        ( 05 Sep 2020 07:04 )             23.8     Urine Studies:  Urinalysis Basic - ( 31 Aug 2020 02:13 )    Color: Yellow / Appearance: Clear / S.016 / pH:   Gluc:  / Ketone: Negative  / Bili: Negative / Urobili: Negative   Blood:  / Protein: Negative / Nitrite: Negative   Leuk Esterase: Negative / RBC:  / WBC    Sq Epi:  / Non Sq Epi:  / Bacteria:       Osmolality, Random Urine: 738 mosm/Kg ( @ 04:46)  Potassium, Random Urine: 64 mmol/L ( @ 20:24)  Sodium, Random Urine: <35 mmol/L ( @ 20:24)  Chloride, Random Urine: <35 mmol/L ( @ 20:24)  Creatinine, Random Urine: 119 mg/dL ( @ 20:24)  Protein/Creatinine Ratio Calculation: 0.8 Ratio ( @ 20:24)    RADIOLOGY & ADDITIONAL STUDIES:

## 2020-09-05 NOTE — PROGRESS NOTE ADULT - ASSESSMENT
55 year old female pt with PMHx of Breast Ca (dx 2005 s/p mastectomy chemo/ 10 years of tamoxifen, leupron, and found to have  mets to liver, lung, bone in 2017, last dose Cytoxan was 3 weeks ago), GERD p/w AMS most likely 2/2 to increase in opioid dosage. There is low suspicion for infection. Palliative following for GOC/Pain management.

## 2020-09-05 NOTE — PROGRESS NOTE ADULT - SUBJECTIVE AND OBJECTIVE BOX
SUBJECTIVE AND OBJECTIVE: received multiple doses of PRN dilaudid, is s/p therapeutic and diagnostic paracentesis  INTERVAL HPI/OVERNIGHT EVENTS:  9/5  - Chart reviewed. Pt seen briefly- about to get cleaned/ bathed  - s/p Paracentesis 9/3  - Comfortable, not in distress  - PCA usage 6pm to 6am= 1 attempt and 1 injection    DNR on chart:   Allergies    Crestor (Joint Pain; Other)  HMG-CoA reductase inhibitors (Unknown)  Lipitor (Rash)  statins (Joint Pain; Rash)  Zocor (Joint Pain; Other)    Intolerances    MEDICATIONS  (STANDING):  albumin human 25% IVPB 100 milliLiter(s) IV Intermittent every 6 hours  chlorhexidine 4% Liquid 1 Application(s) Topical daily  furosemide   Injectable 40 milliGRAM(s) IV Push daily  HYDROmorphone PCA (1 mG/mL) 30 milliLiter(s) PCA Continuous PCA Continuous  pantoprazole    Tablet 40 milliGRAM(s) Oral before breakfast  piperacillin/tazobactam IVPB.. 3.375 Gram(s) IV Intermittent every 8 hours  polyethylene glycol 3350 17 Gram(s) Oral daily  potassium chloride    Tablet ER 40 milliEquivalent(s) Oral once  senna 2 Tablet(s) Oral at bedtime  sodium chloride 0.9% lock flush 3 milliLiter(s) IV Push every 8 hours  sodium phosphate IVPB 30 milliMole(s) IV Intermittent once    MEDICATIONS  (PRN):  HYDROmorphone PCA (1 mG/mL) Rescue Clinician Bolus 1 milliGRAM(s) IV Push every 2 hours PRN Severe Pain (7 - 10)  LORazepam     Tablet 0.5 milliGRAM(s) Oral at bedtime PRN Anxiety  ondansetron Injectable 4 milliGRAM(s) IV Push every 6 hours PRN Nausea and/or Vomiting      ITEMS UNCHECKED ARE NOT PRESENT    PRESENT SYMPTOMS: [ ]Unable to obtain due to poor mentation   Source if other than patient:  [ ]Family   [ ]Team     Pain:  [X ]yes [ ]no  QOL impact - Difficulty with resting  Location -          Abdomen          Aggravating factors - Movement  Quality - Crampy  Radiation - None  Timing- Comes and goes  Severity (0-10 scale): 8/10  Minimal acceptable level (0-10 scale):     Dyspnea:                           [ ]Mild [ ]Moderate [ ]Severe  Anxiety:                             [ ]Mild [ ]Moderate [ ]Severe  Fatigue:                             [ ]Mild [ ]Moderate [ ]Severe  Nausea:                             [ ]Mild [ ]Moderate [ ]Severe  Loss of appetite:              [ ]Mild [ ]Moderate [ ]Severe  Constipation:                    [ ]Mild [ ]Moderate [ ]Severe    PAIN AD Score:	0    Other Symptoms:  [ x]All other review of systems negative     Palliative Performance Status Version 2:   40%      [email or web address]    Vital Signs Last 24 Hrs  T(C): 36.7 (05 Sep 2020 10:04), Max: 36.9 (05 Sep 2020 02:00)  T(F): 98.1 (05 Sep 2020 10:04), Max: 98.4 (05 Sep 2020 02:00)  HR: 106 (05 Sep 2020 10:04) (97 - 106)  BP: 90/58 (05 Sep 2020 10:04) (90/58 - 122/70)  BP(mean): --  RR: 18 (05 Sep 2020 10:04) (18 - 19)  SpO2: 97% (05 Sep 2020 10:04) (97% - 99%)     GENERAL:  [X ]Alert  [X ]Oriented x3   [ ]Lethargic  [ ]Cachexia  [ ]Unarousable  [ X]Verbal  [ ]Non-Verbal  Behavioral:   [ ]Anxiety  [ ]Delirium [ ]Agitation [ ]Other  HEENT:  [ X]Normal   [ ]Dry mouth   [ ]ET Tube/Trach  [ ]Oral lesions  PULMONARY:   [X ]Clear [ ]Tachypnea  [ ]Audible excessive secretions   [ ]Rhonchi        [ ]Right [ ]Left [ ]Bilateral  [ ]Crackles        [ ]Right [ ]Left [ ]Bilateral  [ ]Wheezing     [ ]Right [ ]Left [ ]Bilateral  [ ]Diminished BS [ ] Right [ ]Left [ ]Bilateral  CARDIOVASCULAR:    [X ]Regular [ ]Irregular [ ]Tachy  [ ]Colin [ ]Murmur [ ]Other  GASTROINTESTINAL:  [X ]Soft  [ ]Distended   [ ]+BS  [X ]Non tender [ ]Tender  [ ]PEG [ ]OGT/ NGT   Last BM: 08/31/2020  GENITOURINARY:  [X ]Normal [ ]Incontinent   [ ]Oliguria/Anuria   [ ]Ng  MUSCULOSKELETAL:   [ ]Normal   [X ]Weakness  [ ]Bed/Wheelchair bound [ ]Edema  NEUROLOGIC:   [ X]No focal deficits  [ ] Cognitive impairment  [ ] Dysphagia [ ]Dysarthria [ ] Paresis [ ]Other   SKIN:   [X]Normal  [ ]Rash   [ ]Pressure ulcer(s) [ ]y [ ]n present on admission    CRITICAL CARE:  [ ]Shock Present  [ ]Septic [ ]Cardiogenic [ ]Neurologic [ ]Hypovolemic  [ ]Vasopressors [ ]Inotropes  [ ]Respiratory failure present [ ]Mechanical Ventilation [ ]Non-invasive ventilatory support [ ]High-Flow  [ ]Acute  [ ]Chronic [ ]Hypoxic  [ ]Hypercarbic [ ]Other  [ ]Other organ failure     LABS:    09-05    125<L>  |  84<L>  |  15  ----------------------------<  80  3.4<L>   |  25  |  0.40<L>    Ca    7.5<L>      05 Sep 2020 07:04  Phos  1.4     09-05  Mg     2.2     09-05    TPro  4.6<L>  /  Alb  3.3  /  TBili  8.5<H>  /  DBili  x   /  AST  663<H>  /  ALT  136<H>  /  AlkPhos  732<H>  09-05

## 2020-09-05 NOTE — PROGRESS NOTE ADULT - SUBJECTIVE AND OBJECTIVE BOX
Patient is a 55y old  Female who presents with a chief complaint of Altered mental status (04 Sep 2020 16:54)      SUBJECTIVE / OVERNIGHT EVENTS:    Patient seen and examined. on dilaudid PCA. states pain controlled. denies cp sob. states she had a BM and that was very painful.      Vital Signs Last 24 Hrs  T(C): 36.7 (05 Sep 2020 10:04), Max: 36.9 (05 Sep 2020 02:00)  T(F): 98.1 (05 Sep 2020 10:04), Max: 98.4 (05 Sep 2020 02:00)  HR: 106 (05 Sep 2020 10:04) (97 - 106)  BP: 90/58 (05 Sep 2020 10:04) (90/58 - 122/70)  BP(mean): --  RR: 18 (05 Sep 2020 10:04) (18 - 19)  SpO2: 97% (05 Sep 2020 10:04) (97% - 99%)  I&O's Summary    04 Sep 2020 07:01  -  05 Sep 2020 07:00  --------------------------------------------------------  IN: 995 mL / OUT: 1600 mL / NET: -605 mL        PE:  GENERAL: NAD, AAOx3, older than stated age  HEAD:  Atraumatic, Normocephalic  CHEST/LUNG: CTABL, No wheeze  HEART: Regular rate and rhythm; no murmur  ABDOMEN: TTP, less distended, Bowel sounds present  EXTREMITIES:  2+ Peripheral Pulses, No clubbing, cyanosis, or edema  SKIN: No rashes or lesions  NEURO: No focal deficits    LABS:                        8.0    2.61  )-----------( 12       ( 05 Sep 2020 07:04 )             23.8     09-05    125<L>  |  84<L>  |  15  ----------------------------<  80  3.4<L>   |  25  |  0.40<L>    Ca    7.5<L>      05 Sep 2020 07:04  Phos  1.4     09-05  Mg     2.2     09-05    TPro  4.6<L>  /  Alb  3.3  /  TBili  8.5<H>  /  DBili  x   /  AST  663<H>  /  ALT  136<H>  /  AlkPhos  732<H>  09-05      CAPILLARY BLOOD GLUCOSE                RADIOLOGY & ADDITIONAL TESTS:    Imaging Personally Reviewed:  [x] YES  [ ] NO    Consultant(s) Notes Reviewed:  [x] YES  [ ] NO    MEDICATIONS  (STANDING):  albumin human 25% IVPB 100 milliLiter(s) IV Intermittent every 6 hours  chlorhexidine 4% Liquid 1 Application(s) Topical daily  furosemide   Injectable 40 milliGRAM(s) IV Push daily  HYDROmorphone PCA (1 mG/mL) 30 milliLiter(s) PCA Continuous PCA Continuous  pantoprazole    Tablet 40 milliGRAM(s) Oral before breakfast  piperacillin/tazobactam IVPB.. 3.375 Gram(s) IV Intermittent every 8 hours  polyethylene glycol 3350 17 Gram(s) Oral daily  potassium chloride    Tablet ER 40 milliEquivalent(s) Oral once  senna 2 Tablet(s) Oral at bedtime  sodium chloride 0.9% lock flush 3 milliLiter(s) IV Push every 8 hours  sodium phosphate IVPB 30 milliMole(s) IV Intermittent once    MEDICATIONS  (PRN):  HYDROmorphone PCA (1 mG/mL) Rescue Clinician Bolus 1 milliGRAM(s) IV Push every 2 hours PRN Severe Pain (7 - 10)  LORazepam     Tablet 0.5 milliGRAM(s) Oral at bedtime PRN Anxiety  ondansetron Injectable 4 milliGRAM(s) IV Push every 6 hours PRN Nausea and/or Vomiting      Care Discussed with Consultants/Other Providers [x] YES  [ ] NO    HEALTH ISSUES - PROBLEM Dx:  Hypophosphatemia: Hypophosphatemia  Hyponatremia: Hyponatremia  Dyspnea: Dyspnea  Palliative care encounter: Palliative care encounter  Advanced directives, counseling/discussion: Advanced directives, counseling/discussion  Carcinoma of breast metastatic to multiple sites, unspecified laterality: Carcinoma of breast metastatic to multiple sites, unspecified laterality  Generalized abdominal pain: Generalized abdominal pain  Rib pain: Rib pain  Pain of metastatic malignancy: Pain of metastatic malignancy  Breast cancer metastasized to liver: Breast cancer metastasized to liver  Prophylactic measure: Prophylactic measure  Gastroesophageal reflux disease, esophagitis presence not specified: Gastroesophageal reflux disease, esophagitis presence not specified  Malignant ascites: Malignant ascites  Transaminitis: Transaminitis  Sepsis, due to unspecified organism, unspecified whether acute organ dysfunction present: Sepsis, due to unspecified organism, unspecified whether acute organ dysfunction present  Malignant neoplasm of female breast, unspecified estrogen receptor status, unspecified laterality, unspecified site of breast: Malignant neoplasm of female breast, unspecified estrogen receptor status, unspecified laterality, unspecified site of breast  Acute encephalopathy: Acute encephalopathy  Suspected deep vein thrombosis (DVT)  Suspected pulmonary embolism

## 2020-09-05 NOTE — PROGRESS NOTE ADULT - PROBLEM SELECTOR PLAN 1
low na- mixed etiology  U lytes show intravascular depletion  on lasix for volume overload and also s/p paracentesis  cont  albumin q6h x 8 doses as well as  lasix  trend na q8h for now.

## 2020-09-05 NOTE — CHART NOTE - NSCHARTNOTEFT_GEN_A_CORE
Episodic Note    F/u on Sodium Value that trended up from 121 mmol/L to 123 mmol/L.     Basic Metabolic Panel - STAT (09.04.20 @ 10:33)    Sodium, Serum: 121 mmol/L    Sodium, Serum (09.04.20 @ 20:29)    Sodium, Serum: 123 mmol/L    ASSESSMENT/PLAN:    HPI: 56yo female pt with PMHx of Breast Ca (dx 2005 with mets to liver, lung, bone, last dose Cytoxan was 3 weeks ago), GERD p/w acute encephalopathy as well as SIRS. F/u on sodium values.     Impression: Hyponatremia  > Nephrology is following the patient regarding this.  > Spoke to Nephrology attending, Dr. Patel to make him aware of the above result.   > Per Dr. Patel he is okay with the above Sodium value at this time.   > Per Dr. Patel, c/w albumin and Lasix at this time.   > Continue to monitor the patient and vitals closely.  > F/u with primary care team and renal in AM.    RN made aware of above plan. Dr. Patel made aware of the above sodium values.     JUSTIN JohnsonC  Department of Medicine   #[id number] Episodic Note    F/u on Sodium Value that trended up from 121 mmol/L to 123 mmol/L.     Basic Metabolic Panel - STAT (09.04.20 @ 10:33)    Sodium, Serum: 121 mmol/L    Sodium, Serum (09.04.20 @ 20:29)    Sodium, Serum: 123 mmol/L    ASSESSMENT/PLAN:    HPI: 54yo female pt with PMHx of Breast Ca (dx 2005 with mets to liver, lung, bone, last dose Cytoxan was 3 weeks ago), GERD p/w acute encephalopathy as well as SIRS. F/u on sodium values.     Impression: Hyponatremia  > Nephrology is following the patient regarding this.  > Spoke to Nephrology attending, Dr. Patel to make him aware of the above result.   > Per Dr. Patel he is okay with the above Sodium value at this time.   > Per Dr. Patel, c/w albumin and Lasix at this time.   > Continue to monitor the patient and vitals closely.  > F/u AM labs, specifically Sodium value.  > F/u with primary care team and renal in AM.    RN made aware of above plan. Dr. Patel made aware of the above sodium values.     Nelsy Huertas PA-C  Department of Medicine   #[id number]

## 2020-09-05 NOTE — PROGRESS NOTE ADULT - ASSESSMENT
54yo female pt with PMHx of Breast Ca (dx 2005 with mets to liver, lung, bone, last dose Cytoxan was 3 weeks ago), GERD p/w acute encephalopathy as well as SIRS.

## 2020-09-05 NOTE — PROGRESS NOTE ADULT - PROBLEM SELECTOR PLAN 4
Significant transaminitis compared to prior.   chemo induced, vs mets vs fatty liver  elev bili and coags? hepatic insufficiency  Gi/hepatology cs appreciated  hyponatremia improving, lasix 40 IV qd, renal following

## 2020-09-05 NOTE — PROGRESS NOTE ADULT - PROBLEM SELECTOR PLAN 2
Hx of metastatic breast cancer dx in 2015 prior chemo/RT with known mets to brain, liver, and lung. Pt states last chemo was roughly 2-3 weeks ago  appreciate palliative care recs and discussion with family  DNR/DNI, hospice eval  dilaudid PCA

## 2020-09-05 NOTE — PROGRESS NOTE ADULT - PROBLEM SELECTOR PLAN 1
-Most likely 2/2 to metastatic disease given imaging findings  -Will switch dilaudid IV 1mg q3hr to dilaudid PCA 0.2/0.4/20//1mg    9/5  > Only used 1 demand dose past 12 hours since initiation  > No dex or NSAID's 2/2 low plts

## 2020-09-05 NOTE — PROGRESS NOTE ADULT - PROBLEM SELECTOR PLAN 3
likely 2/2 metastatic dz  sp diag and therapeutic paracentesis 9/3, albumin post para for 8 doses  zosyn ends 9/6  cultures NTD  BCX and UCx neg

## 2020-09-05 NOTE — PROGRESS NOTE ADULT - PROBLEM SELECTOR PLAN 2
-Slight improvement s/p para. However her abdominal pain is now more crampy than pressure  -Dilaudid PCA as above  - s/p Para 9/3

## 2020-09-06 NOTE — PROGRESS NOTE ADULT - SUBJECTIVE AND OBJECTIVE BOX
9/6  - Chart reviewed. Patient seen and examined.  - Used 8 DD/ 0 CB in past 24h  - Comfortable. She says the PCA is good but can adjust the dose a little more (continuous, she says DD is fine)  - Last BM 2 nights ago. She does NOT want senna or other laxatives for now because of the inconvenience of having to stay on a commode with her pain. She says she will ask for laxatives if she feels full.     DNR on chart:   Allergies    Crestor (Joint Pain; Other)  HMG-CoA reductase inhibitors (Unknown)  Lipitor (Rash)  statins (Joint Pain; Rash)  Zocor (Joint Pain; Other)    Intolerances    MEDICATIONS  (STANDING):  chlorhexidine 4% Liquid 1 Application(s) Topical daily  furosemide   Injectable 40 milliGRAM(s) IV Push daily  HYDROmorphone PCA (1 mG/mL) 30 milliLiter(s) PCA Continuous PCA Continuous  pantoprazole    Tablet 40 milliGRAM(s) Oral before breakfast  polyethylene glycol 3350 17 Gram(s) Oral daily  senna 2 Tablet(s) Oral at bedtime  sodium chloride 0.9% lock flush 3 milliLiter(s) IV Push every 8 hours    MEDICATIONS  (PRN):  HYDROmorphone PCA (1 mG/mL) Rescue Clinician Bolus 1 milliGRAM(s) IV Push every 2 hours PRN Severe Pain (7 - 10)  LORazepam     Tablet 0.5 milliGRAM(s) Oral at bedtime PRN Anxiety  ondansetron Injectable 4 milliGRAM(s) IV Push every 6 hours PRN Nausea and/or Vomiting      ITEMS UNCHECKED ARE NOT PRESENT    PRESENT SYMPTOMS: [ ]Unable to obtain due to poor mentation   Source if other than patient:  [ ]Family   [ ]Team     Pain:  [X ]yes [ ]no  QOL impact - Difficulty with resting  Location -          Abdomen          Aggravating factors - Movement  Quality - Crampy  Radiation - None  Timing- Comes and goes  Severity (0-10 scale): 8/10  Minimal acceptable level (0-10 scale):     Dyspnea:                           [ ]Mild [ ]Moderate [ ]Severe  Anxiety:                             [ ]Mild [ ]Moderate [ ]Severe  Fatigue:                             [ ]Mild [ ]Moderate [ ]Severe  Nausea:                             [ ]Mild [ ]Moderate [ ]Severe  Loss of appetite:              [ ]Mild [ ]Moderate [ ]Severe  Constipation:                    [ ]Mild [ ]Moderate [ ]Severe    PAIN AD Score:	0    Other Symptoms:  [ x]All other review of systems negative     Palliative Performance Status Version 2:   40%      [email or web address]    Vital Signs Last 24 Hrs  T(C): 36.5 (06 Sep 2020 14:00), Max: 36.9 (06 Sep 2020 00:43)  T(F): 97.7 (06 Sep 2020 14:00), Max: 98.4 (06 Sep 2020 00:43)  HR: 91 (06 Sep 2020 14:00) (91 - 110)  BP: 98/58 (06 Sep 2020 14:00) (93/58 - 120/74)  BP(mean): --  RR: 18 (06 Sep 2020 14:00) (18 - 18)  SpO2: 96% (06 Sep 2020 14:00) (93% - 98%)     GENERAL:  [X ]Alert  [X ]Oriented x3   [ ]Lethargic  [ ]Cachexia  [ ]Unarousable  [ X]Verbal  [ ]Non-Verbal  Behavioral:   [ ]Anxiety  [ ]Delirium [ ]Agitation [ ]Other  HEENT:  [ X]Normal   [ ]Dry mouth   [ ]ET Tube/Trach  [ ]Oral lesions  PULMONARY:   [X ]Clear [ ]Tachypnea  [ ]Audible excessive secretions   [ ]Rhonchi        [ ]Right [ ]Left [ ]Bilateral  [ ]Crackles        [ ]Right [ ]Left [ ]Bilateral  [ ]Wheezing     [ ]Right [ ]Left [ ]Bilateral  [ ]Diminished BS [ ] Right [ ]Left [ ]Bilateral  CARDIOVASCULAR:    [X ]Regular [ ]Irregular [ ]Tachy  [ ]Colin [ ]Murmur [ ]Other  GASTROINTESTINAL:  [X ]Soft  [ ]Distended   [ ]+BS  [X ]Non tender [ ]Tender  [ ]PEG [ ]OGT/ NGT   Last BM: 08/31/2020  GENITOURINARY:  [X ]Normal [ ]Incontinent   [ ]Oliguria/Anuria   [ ]Ng  MUSCULOSKELETAL:   [ ]Normal   [X ]Weakness  [ ]Bed/Wheelchair bound [ ]Edema  NEUROLOGIC:   [ X]No focal deficits  [ ] Cognitive impairment  [ ] Dysphagia [ ]Dysarthria [ ] Paresis [ ]Other   SKIN:   [X]Normal  [ ]Rash   [ ]Pressure ulcer(s) [ ]y [ ]n present on admission    CRITICAL CARE:  [ ]Shock Present  [ ]Septic [ ]Cardiogenic [ ]Neurologic [ ]Hypovolemic  [ ]Vasopressors [ ]Inotropes  [ ]Respiratory failure present [ ]Mechanical Ventilation [ ]Non-invasive ventilatory support [ ]High-Flow  [ ]Acute  [ ]Chronic [ ]Hypoxic  [ ]Hypercarbic [ ]Other  [ ]Other organ failure     LABS:   09-06    125<L>  |  85<L>  |  10  ----------------------------<  73  3.0<L>   |  27  |  0.38<L>    Ca    8.0<L>      06 Sep 2020 07:15  Phos  1.0     09-06  Mg     1.9     09-06    TPro  4.8<L>  /  Alb  3.9  /  TBili  8.9<H>  /  DBili  x   /  AST  417<H>  /  ALT  101<H>  /  AlkPhos  570<H>  09-06

## 2020-09-06 NOTE — PROGRESS NOTE ADULT - PROBLEM SELECTOR PLAN 4
Significant transaminitis compared to prior.   chemo induced, vs mets vs fatty liver  elev bili and coags? hepatic insufficiency  Gi/hepatology cs appreciated  hyponatremia improving, lasix 40 IV qd, renal following  repete k phos Significant transaminitis compared to prior.   chemo induced, vs mets vs fatty liver  elev bili and coags? hepatic insufficiency  Gi/hepatology cs appreciated  hyponatremia, lasix 40 IV qd, renal following  repete k phos

## 2020-09-06 NOTE — PROGRESS NOTE ADULT - PROBLEM SELECTOR PLAN 1
-Most likely 2/2 to metastatic disease given imaging findings    9/6  > Dilaudid PCA to: 0.3/hr + 0.4DD q20min lockout + 1mg IV q2h CB  > No dex or NSAID's 2/2 low plts

## 2020-09-06 NOTE — PROGRESS NOTE ADULT - SUBJECTIVE AND OBJECTIVE BOX
Patient is a 55y old  Female who presents with a chief complaint of Altered mental status (05 Sep 2020 14:15)      SUBJECTIVE / OVERNIGHT EVENTS:    Patient seen and examined. denies cp sob. co rib pain and had to push PCA pump.       Vital Signs Last 24 Hrs  T(C): 36.3 (06 Sep 2020 10:00), Max: 36.9 (05 Sep 2020 14:29)  T(F): 97.4 (06 Sep 2020 10:00), Max: 98.4 (05 Sep 2020 14:29)  HR: 108 (06 Sep 2020 10:00) (97 - 110)  BP: 106/65 (06 Sep 2020 10:00) (93/58 - 120/74)  BP(mean): --  RR: 18 (06 Sep 2020 10:00) (18 - 18)  SpO2: 97% (06 Sep 2020 10:00) (93% - 98%)  I&O's Summary    05 Sep 2020 07:01  -  06 Sep 2020 07:00  --------------------------------------------------------  IN: 820 mL / OUT: 2450 mL / NET: -1630 mL    06 Sep 2020 07:01  -  06 Sep 2020 11:16  --------------------------------------------------------  IN: 100 mL / OUT: 0 mL / NET: 100 mL        PE:  GENERAL: NAD, AAOx3, older than stated age  HEAD:  Atraumatic, Normocephalic  CHEST/LUNG: CTABL, No wheeze  HEART: Regular rate and rhythm; no murmur  ABDOMEN: TTP, distended, Bowel sounds present  EXTREMITIES:  2+ Peripheral Pulses, No clubbing, cyanosis, or edema  SKIN: No rashes or lesions  NEURO: No focal deficits      LABS:                        7.4    2.37  )-----------( 11       ( 06 Sep 2020 07:15 )             22.2     09-06    125<L>  |  85<L>  |  10  ----------------------------<  73  3.0<L>   |  27  |  0.38<L>    Ca    8.0<L>      06 Sep 2020 07:15  Phos  1.0     09-06  Mg     1.9     09-06    TPro  4.8<L>  /  Alb  3.9  /  TBili  8.9<H>  /  DBili  x   /  AST  417<H>  /  ALT  101<H>  /  AlkPhos  570<H>  09-06      CAPILLARY BLOOD GLUCOSE                RADIOLOGY & ADDITIONAL TESTS:    Imaging Personally Reviewed:  [x] YES  [ ] NO    Consultant(s) Notes Reviewed:  [x] YES  [ ] NO    MEDICATIONS  (STANDING):  chlorhexidine 4% Liquid 1 Application(s) Topical daily  furosemide   Injectable 40 milliGRAM(s) IV Push daily  HYDROmorphone PCA (1 mG/mL) 30 milliLiter(s) PCA Continuous PCA Continuous  pantoprazole    Tablet 40 milliGRAM(s) Oral before breakfast  polyethylene glycol 3350 17 Gram(s) Oral daily  senna 2 Tablet(s) Oral at bedtime  sodium chloride 0.9% lock flush 3 milliLiter(s) IV Push every 8 hours    MEDICATIONS  (PRN):  HYDROmorphone PCA (1 mG/mL) Rescue Clinician Bolus 1 milliGRAM(s) IV Push every 2 hours PRN Severe Pain (7 - 10)  LORazepam     Tablet 0.5 milliGRAM(s) Oral at bedtime PRN Anxiety  ondansetron Injectable 4 milliGRAM(s) IV Push every 6 hours PRN Nausea and/or Vomiting      Care Discussed with Consultants/Other Providers [x] YES  [ ] NO    HEALTH ISSUES - PROBLEM Dx:  Hypophosphatemia: Hypophosphatemia  Hyponatremia: Hyponatremia  Dyspnea: Dyspnea  Palliative care encounter: Palliative care encounter  Advanced directives, counseling/discussion: Advanced directives, counseling/discussion  Carcinoma of breast metastatic to multiple sites, unspecified laterality: Carcinoma of breast metastatic to multiple sites, unspecified laterality  Generalized abdominal pain: Generalized abdominal pain  Rib pain: Rib pain  Pain of metastatic malignancy: Pain of metastatic malignancy  Breast cancer metastasized to liver: Breast cancer metastasized to liver  Prophylactic measure: Prophylactic measure  Gastroesophageal reflux disease, esophagitis presence not specified: Gastroesophageal reflux disease, esophagitis presence not specified  Malignant ascites: Malignant ascites  Transaminitis: Transaminitis  Sepsis, due to unspecified organism, unspecified whether acute organ dysfunction present: Sepsis, due to unspecified organism, unspecified whether acute organ dysfunction present  Malignant neoplasm of female breast, unspecified estrogen receptor status, unspecified laterality, unspecified site of breast: Malignant neoplasm of female breast, unspecified estrogen receptor status, unspecified laterality, unspecified site of breast  Acute encephalopathy: Acute encephalopathy  Suspected deep vein thrombosis (DVT)  Suspected pulmonary embolism

## 2020-09-06 NOTE — PROGRESS NOTE ADULT - PROBLEM SELECTOR PLAN 3
.  LAst BM 9/4 evening    > Declining laxatives for now due to inconvenience/ pain of having to stay in commode  > she says she will request for laxatives if she feels full

## 2020-09-06 NOTE — PROGRESS NOTE ADULT - PROBLEM SELECTOR PLAN 5
sp therapeutic and diagnostic paracentesis removal 3.8 L  monitor for reaccumulation  SP platelet transfusion prior  consider repeat ABD US if fluid accumulates

## 2020-09-06 NOTE — PROGRESS NOTE ADULT - PROBLEM SELECTOR PLAN 2
Hx of metastatic breast cancer dx in 2015 prior chemo/RT with known mets to brain, liver, and lung. Pt states last chemo was roughly 2-3 weeks ago  appreciate palliative care recs and discussion with family  DNR/DNI, hospice eval  dilaudid PCA Hx of metastatic breast cancer dx in 2015 prior chemo/RT with known mets to brain, liver, and lung. Pt states last chemo was roughly 2-3 weeks ago  pancytopenia  appreciate palliative care recs and discussion with family  DNR/DNI, hospice eval  dilaudid PCA

## 2020-09-07 NOTE — DIETITIAN INITIAL EVALUATION ADULT. - REASON INDICATOR FOR ASSESSMENT
patient seen for length of stay. patient followed by palliative care for pain management, s/p parecentesis, to be repeated again tomorrow. 54yo female pt with PMHx of Breast Ca (dx 2005 with mets to liver, lung, bone, last dose Cytoxan was 3 weeks ago), GERD p/w acute encephalopathy as well as SIRS. Unclear if encephalopathy more sepsis driven or opiate driven, likely multifactorial.      Admit  Dx  Pna,  Acute  Enceph

## 2020-09-07 NOTE — PROGRESS NOTE ADULT - PROBLEM SELECTOR PLAN 1
-Most likely 2/2 to metastatic disease given imaging findings    9/7  > Dilaudid PCA to: 0.3/hr + 0.4DD q20min lockout + 1mg IV q2h CB (DD adjusted 9/6)  > No dex or NSAID's 2/2 low plts

## 2020-09-07 NOTE — PROGRESS NOTE ADULT - SUBJECTIVE AND OBJECTIVE BOX
9/7  - Chart reviewed. Patient briefly seen and examined earlier. Sleepy but easily arousable.   - Used 9 DD since dose adjusted/ 0 CB in past 24h  - With abdominal discomfort. s/p paracentesis a few days ago. For abd US.     DNR on chart:   Allergies    Crestor (Joint Pain; Other)  HMG-CoA reductase inhibitors (Unknown)  Lipitor (Rash)  statins (Joint Pain; Rash)  Zocor (Joint Pain; Other)    Intolerances    MEDICATIONS  (STANDING):  chlorhexidine 4% Liquid 1 Application(s) Topical daily  HYDROmorphone PCA (1 mG/mL) 30 milliLiter(s) PCA Continuous PCA Continuous  pantoprazole    Tablet 40 milliGRAM(s) Oral before breakfast  polyethylene glycol 3350 17 Gram(s) Oral daily  senna 2 Tablet(s) Oral at bedtime  sodium chloride 0.9% lock flush 3 milliLiter(s) IV Push every 8 hours    MEDICATIONS  (PRN):  HYDROmorphone PCA (1 mG/mL) Rescue Clinician Bolus 1 milliGRAM(s) IV Push every 2 hours PRN Severe Pain (7 - 10)  ondansetron Injectable 4 milliGRAM(s) IV Push every 6 hours PRN Nausea and/or Vomiting    ITEMS UNCHECKED ARE NOT PRESENT    PRESENT SYMPTOMS: [ ]Unable to obtain due to poor mentation   Source if other than patient:  [ ]Family   [ ]Team     Pain:  [X ]yes [ ]no  QOL impact - Difficulty with resting  Location -          Abdomen          Aggravating factors - Movement  Quality - Crampy  Radiation - None  Timing- Comes and goes  Severity (0-10 scale): 8/10  Minimal acceptable level (0-10 scale):     Dyspnea:                           [ ]Mild [ ]Moderate [ ]Severe  Anxiety:                             [ ]Mild [ ]Moderate [ ]Severe  Fatigue:                             [ ]Mild [ ]Moderate [ ]Severe  Nausea:                             [ ]Mild [ ]Moderate [ ]Severe  Loss of appetite:              [ ]Mild [ ]Moderate [ ]Severe  Constipation:                    [ ]Mild [ ]Moderate [ ]Severe    PAIN AD Score:	0    Other Symptoms:  [ x]All other review of systems negative     Palliative Performance Status Version 2:   40%        Vital Signs Last 24 Hrs  Vital Signs Last 24 Hrs  T(C): 36.3 (07 Sep 2020 10:00), Max: 36.7 (06 Sep 2020 21:02)  T(F): 97.4 (07 Sep 2020 10:00), Max: 98 (06 Sep 2020 21:02)  HR: 103 (07 Sep 2020 10:00) (91 - 107)  BP: 110/75 (07 Sep 2020 10:00) (96/58 - 110/75)  BP(mean): --  RR: 18 (07 Sep 2020 10:00) (18 - 18)  SpO2: 98% (07 Sep 2020 10:00) (96% - 99%)     GENERAL:  [X ]Alert  [X ]Oriented x3   [ ]Lethargic  [ ]Cachexia  [ ]Unarousable  [ X]Verbal  [ ]Non-Verbal  Behavioral:   [ ]Anxiety  [ ]Delirium [ ]Agitation [ ]Other  HEENT:  [ X]Normal   [ ]Dry mouth   [ ]ET Tube/Trach  [ ]Oral lesions  PULMONARY:   [X ]Clear [ ]Tachypnea  [ ]Audible excessive secretions   [ ]Rhonchi        [ ]Right [ ]Left [ ]Bilateral  [ ]Crackles        [ ]Right [ ]Left [ ]Bilateral  [ ]Wheezing     [ ]Right [ ]Left [ ]Bilateral  [ ]Diminished BS [ ] Right [ ]Left [ ]Bilateral  CARDIOVASCULAR:    [X ]Regular [ ]Irregular [ ]Tachy  [ ]Colin [ ]Murmur [ ]Other  GASTROINTESTINAL:  [X ]Soft  [ ]Distended   [ ]+BS  [X ]Non tender [ ]Tender  [ ]PEG [ ]OGT/ NGT   Last BM: 08/31/2020  GENITOURINARY:  [X ]Normal [ ]Incontinent   [ ]Oliguria/Anuria   [ ]Ng  MUSCULOSKELETAL:   [ ]Normal   [X ]Weakness  [ ]Bed/Wheelchair bound [ ]Edema  NEUROLOGIC:   [ X]No focal deficits  [ ] Cognitive impairment  [ ] Dysphagia [ ]Dysarthria [ ] Paresis [ ]Other   SKIN:   [X]Normal  [ ]Rash   [ ]Pressure ulcer(s) [ ]y [ ]n present on admission    CRITICAL CARE:  [ ]Shock Present  [ ]Septic [ ]Cardiogenic [ ]Neurologic [ ]Hypovolemic  [ ]Vasopressors [ ]Inotropes  [ ]Respiratory failure present [ ]Mechanical Ventilation [ ]Non-invasive ventilatory support [ ]High-Flow  [ ]Acute  [ ]Chronic [ ]Hypoxic  [ ]Hypercarbic [ ]Other  [ ]Other organ failure     LABS:    09-07    127<L>  |  87<L>  |  12  ----------------------------<  82  3.8   |  25  |  0.39<L>    Ca    7.8<L>      07 Sep 2020 07:04  Phos  2.6     09-06  Mg     1.7     09-07    TPro  4.6<L>  /  Alb  3.6  /  TBili  11.1<H>  /  DBili  x   /  AST  384<H>  /  ALT  95<H>  /  AlkPhos  567<H>  09-07

## 2020-09-07 NOTE — PROGRESS NOTE ADULT - PROBLEM SELECTOR PLAN 4
Significant transaminitis compared to prior.   chemo induced, vs mets vs fatty liver  elev bili and coags? hepatic insufficiency  Gi/hepatology cs appreciated  hyponatremia improving, renal following

## 2020-09-07 NOTE — PROGRESS NOTE ADULT - PROBLEM SELECTOR PROBLEM 8
Pain of metastatic malignancy

## 2020-09-07 NOTE — PROGRESS NOTE ADULT - PROBLEM SELECTOR PLAN 1
low na- mixed etiology  U lytes show intravascular depletion  Off IV Albumin, no longer had edema  stop IV Lasix  Fluid restrict to 1L QD  repeat Hattie and UOsm

## 2020-09-07 NOTE — PROVIDER CONTACT NOTE (CRITICAL VALUE NOTIFICATION) - ACTION/TREATMENT ORDERED:
Monitor patient for bleeding
NP aware of above, no addl tx at this time-will cont to monitor pt
NP aware of above, to evaluate pt and order transfusion .
NP aware of above-to order replacement.
No new order given.
Supplements to be ordered. See EMAR
monitor platelet count
TENA Jenkins aware of plt 15, will continue to monitor pt, bleeding precautions maintained
no new order at this time

## 2020-09-07 NOTE — PROGRESS NOTE ADULT - SUBJECTIVE AND OBJECTIVE BOX
Patient is a 55y old  Female who presents with a chief complaint of Altered mental status (07 Sep 2020 10:30)      SUBJECTIVE / OVERNIGHT EVENTS:    off floor for abd US.      Vital Signs Last 24 Hrs  T(C): 36.3 (07 Sep 2020 10:00), Max: 36.7 (06 Sep 2020 21:02)  T(F): 97.4 (07 Sep 2020 10:00), Max: 98 (06 Sep 2020 21:02)  HR: 103 (07 Sep 2020 10:00) (91 - 107)  BP: 110/75 (07 Sep 2020 10:00) (96/58 - 110/75)  BP(mean): --  RR: 18 (07 Sep 2020 10:00) (18 - 18)  SpO2: 98% (07 Sep 2020 10:00) (96% - 99%)  I&O's Summary    06 Sep 2020 07:01  -  07 Sep 2020 07:00  --------------------------------------------------------  IN: 960 mL / OUT: 600 mL / NET: 360 mL    07 Sep 2020 07:01  -  07 Sep 2020 11:38  --------------------------------------------------------  IN: 0 mL / OUT: 100 mL / NET: -100 mL        LABS:                        8.1    2.65  )-----------( 9        ( 07 Sep 2020 07:04 )             25.3     09-07    127<L>  |  87<L>  |  12  ----------------------------<  82  3.8   |  25  |  0.39<L>    Ca    7.8<L>      07 Sep 2020 07:04  Phos  2.6     09-06  Mg     1.7     09-07    TPro  4.6<L>  /  Alb  3.6  /  TBili  11.1<H>  /  DBili  x   /  AST  384<H>  /  ALT  95<H>  /  AlkPhos  567<H>  09-07      CAPILLARY BLOOD GLUCOSE                RADIOLOGY & ADDITIONAL TESTS:    Imaging Personally Reviewed:  [x] YES  [ ] NO    Consultant(s) Notes Reviewed:  [x] YES  [ ] NO    MEDICATIONS  (STANDING):  chlorhexidine 4% Liquid 1 Application(s) Topical daily  HYDROmorphone PCA (1 mG/mL) 30 milliLiter(s) PCA Continuous PCA Continuous  pantoprazole    Tablet 40 milliGRAM(s) Oral before breakfast  polyethylene glycol 3350 17 Gram(s) Oral daily  senna 2 Tablet(s) Oral at bedtime  sodium chloride 0.9% lock flush 3 milliLiter(s) IV Push every 8 hours    MEDICATIONS  (PRN):  HYDROmorphone PCA (1 mG/mL) Rescue Clinician Bolus 1 milliGRAM(s) IV Push every 2 hours PRN Severe Pain (7 - 10)  ondansetron Injectable 4 milliGRAM(s) IV Push every 6 hours PRN Nausea and/or Vomiting      Care Discussed with Consultants/Other Providers [x] YES  [ ] NO    HEALTH ISSUES - PROBLEM Dx:  Constipation due to opioid therapy: Constipation due to opioid therapy  Hypophosphatemia: Hypophosphatemia  Hyponatremia: Hyponatremia  Dyspnea: Dyspnea  Palliative care encounter: Palliative care encounter  Advanced directives, counseling/discussion: Advanced directives, counseling/discussion  Carcinoma of breast metastatic to multiple sites, unspecified laterality: Carcinoma of breast metastatic to multiple sites, unspecified laterality  Generalized abdominal pain: Generalized abdominal pain  Rib pain: Rib pain  Pain of metastatic malignancy: Pain of metastatic malignancy  Breast cancer metastasized to liver: Breast cancer metastasized to liver  Prophylactic measure: Prophylactic measure  Gastroesophageal reflux disease, esophagitis presence not specified: Gastroesophageal reflux disease, esophagitis presence not specified  Malignant ascites: Malignant ascites  Transaminitis: Transaminitis  Sepsis, due to unspecified organism, unspecified whether acute organ dysfunction present: Sepsis, due to unspecified organism, unspecified whether acute organ dysfunction present  Malignant neoplasm of female breast, unspecified estrogen receptor status, unspecified laterality, unspecified site of breast: Malignant neoplasm of female breast, unspecified estrogen receptor status, unspecified laterality, unspecified site of breast  Acute encephalopathy: Acute encephalopathy  Suspected deep vein thrombosis (DVT)  Suspected pulmonary embolism

## 2020-09-07 NOTE — DIETITIAN INITIAL EVALUATION ADULT. - PROBLEM SELECTOR PLAN 4
Significant transaminitis compared to prior. Previously attributed to recent cytoxan exposure as well underlying bulky mets and fatty liver disease due to unaddressed HLD 2/2 statin intolerance  -Trend liver enzymes  -Trend coags  -Consider hepatology consult

## 2020-09-07 NOTE — DIETITIAN INITIAL EVALUATION ADULT. - OTHER INFO
patient visited last week and again today, noticable decline in mental status. patient  family at beside encouraging intake, assisting with feeding.  patient cannot tolerate supplements, likes chocolate milk with meals, refused ensure, or  ensure clears. pt fed by sister and brother at bedside. patient receiving pain management . paracentesis planned for tomorrow. patient family bringing food from home.  diet ed provided on 1000ml fluid restriction. per RN patient eats small amounts continuously, averaging 50% of meals.

## 2020-09-07 NOTE — PROGRESS NOTE ADULT - SUBJECTIVE AND OBJECTIVE BOX
[facility] - GAGE aCsas / GAGE Montemayor / TESSA Fowler/ GAGE Patel/ GAGE Medina/ KAITLIN Contreras / JAVIER De La Cruz / FAVIAN Johnson  ---------------------------------------------------------------------------------------------------------------  seen and examined today for hyponatremia  Interval : NAD  VITALS:  T(F): 98 (09-07-20 @ 05:18), Max: 98 (09-06-20 @ 21:02)  HR: 105 (09-07-20 @ 05:18)  BP: 98/62 (09-07-20 @ 05:18)  RR: 18 (09-07-20 @ 05:18)  SpO2: 99% (09-07-20 @ 05:18)  Wt(kg): --    09-06 @ 07:01  -  09-07 @ 07:00  --------------------------------------------------------  IN: 960 mL / OUT: 600 mL / NET: 360 mL      Physical Exam :-  Constitutional: NAD  Neck: Supple.  Respiratory: Bilateral equal breath sounds,  Cardiovascular: S1, S2 normal,  Gastrointestinal: Bowel Sounds present, distended and tender  Extremities: No edema  Neurological: Alert and Oriented x 3, no focal deficits  Psychiatric: Normal mood, normal affect  Data:-  Allergies :   Crestor (Joint Pain; Other)  HMG-CoA reductase inhibitors (Unknown)  Lipitor (Rash)  statins (Joint Pain; Rash)  Zocor (Joint Pain; Other)    Hospital Medications:   MEDICATIONS  (STANDING):  chlorhexidine 4% Liquid 1 Application(s) Topical daily  HYDROmorphone PCA (1 mG/mL) 30 milliLiter(s) PCA Continuous PCA Continuous  pantoprazole    Tablet 40 milliGRAM(s) Oral before breakfast  polyethylene glycol 3350 17 Gram(s) Oral daily  senna 2 Tablet(s) Oral at bedtime  sodium chloride 0.9% lock flush 3 milliLiter(s) IV Push every 8 hours    09-07    127<L>  |  87<L>  |  12  ----------------------------<  82  3.8   |  25  |  0.39<L>    Ca    7.8<L>      07 Sep 2020 07:04  Phos  2.6     09-06  Mg     1.7     09-07    TPro  4.6<L>  /  Alb  3.6  /  TBili  11.1<H>  /  DBili      /  AST  384<H>  /  ALT  95<H>  /  AlkPhos  567<H>  09-07    Creatinine Trend: 0.39 <--, 0.32 <--, 0.38 <--, 0.40 <--, 0.53 <--, 0.37 <--, 0.41 <--, 0.47 <--, 0.48 <--                        8.1    2.65  )-----------( 9        ( 07 Sep 2020 07:04 )             25.3

## 2020-09-07 NOTE — PROGRESS NOTE ADULT - PROBLEM SELECTOR PLAN 3
likely 2/2 metastatic dz  sp diag and therapeutic paracentesis 9/3  cultures NTD  BCX and UCx neg  repeat abd us to reevaluate ascites

## 2020-09-07 NOTE — PROGRESS NOTE ADULT - PROBLEM SELECTOR PLAN 2
Hx of metastatic breast cancer dx in 2015 prior chemo/RT with known mets to brain, liver, and lung. Pt states last chemo was roughly 2-3 weeks ago  pancytopenia  appreciate palliative care recs and discussion with family  DNR/DNI, hospice eval  dilaudid PCA

## 2020-09-08 NOTE — PROGRESS NOTE ADULT - PROBLEM SELECTOR PLAN 1
low na- mixed etiology  U lytes show intravascular depletion  Off IV Albumin,   off IV Lasix  Fluid restrict to 1L QD  repeat Hattie and UOsm  trend na

## 2020-09-08 NOTE — PROGRESS NOTE ADULT - PROBLEM SELECTOR PROBLEM 7
Prophylactic measure
Pain of metastatic malignancy
Palliative care encounter
Palliative care encounter
Prophylactic measure
Palliative care encounter

## 2020-09-08 NOTE — PROGRESS NOTE ADULT - PROBLEM SELECTOR PLAN 4
-Pt mentions she discussed her options with her oncologist and she made the decision to stop further chemotherapy as this can worsen her QOL. -Mild  -Adding ativan 0.25 mg PRN IV

## 2020-09-08 NOTE — PROGRESS NOTE ADULT - PROBLEM SELECTOR PLAN 7
DVT PPx-Lovenox
DVT PPx  -Lovenox
DVT PPx-Lovenox
Hospice referral    Thank you for allowing us to participate in your patient's care. We will continue to follow with you. Please page [phone] or contact us via Microsoft Teams for any q's or c's.     Sebastián Juarez  Palliative Medicine
Hospice referral    Thank you for allowing us to participate in your patient's care. We will continue to follow with you. Please page [phone] or contact us via Microsoft Teams for any q's or c's.     Sebastián Juarez  Palliative Medicine
pall care cs for pain control  dilaudid PCA  transfer to PCU
DVT PPx  -Lovenox
-HCP in chart  -MOLST filled and in chart, DNR/DNI  -c/w Dilaudid PCA for symptom control  -hospice referral for home hospice sent  -Plan for transfer to PCU

## 2020-09-08 NOTE — PROGRESS NOTE ADULT - PROVIDER SPECIALTY LIST ADULT
Gastroenterology
Infectious Disease
Internal Medicine
Nephrology
Palliative Care
Internal Medicine
Internal Medicine
Palliative Care
Internal Medicine
Internal Medicine

## 2020-09-08 NOTE — PROGRESS NOTE ADULT - PROBLEM SELECTOR PLAN 5
-Held GOC meeting today with pt and siblings, details documented above  -MOLST form completed and in chart. Pt is DNR/DNI  -Hospice referral -Pt mentions she discussed her options with her oncologist and she made the decision to stop further chemotherapy as this can worsen her QOL.

## 2020-09-08 NOTE — PROGRESS NOTE ADULT - SUBJECTIVE AND OBJECTIVE BOX
Patient seen and examined  no complaints  lethargic    Crestor (Joint Pain; Other)  HMG-CoA reductase inhibitors (Unknown)  Lipitor (Rash)  statins (Joint Pain; Rash)  Zocor (Joint Pain; Other)    Hospital Medications:   MEDICATIONS  (STANDING):  dexAMETHasone  Injectable 2 milliGRAM(s) IV Push two times a day  HYDROmorphone PCA (1 mG/mL) 30 milliLiter(s) PCA Continuous PCA Continuous  pantoprazole    Tablet 40 milliGRAM(s) Oral before breakfast  phytonadione   Solution 5 milliGRAM(s) Oral once  senna 2 Tablet(s) Oral at bedtime  sodium chloride 0.9% lock flush 3 milliLiter(s) IV Push every 8 hours        VITALS:  T(F): 98.4 (09-08-20 @ 13:26), Max: 98.4 (09-08-20 @ 13:26)  HR: 107 (09-08-20 @ 13:26)  BP: 92/57 (09-08-20 @ 13:26)  RR: 20 (09-08-20 @ 13:26)  SpO2: 92% (09-08-20 @ 13:26)  Wt(kg): --    09-07 @ 07:01  -  09-08 @ 07:00  --------------------------------------------------------  IN: 540 mL / OUT: 750 mL / NET: -210 mL    09-08 @ 07:01  -  09-08 @ 17:20  --------------------------------------------------------  IN: 170 mL / OUT: 0 mL / NET: 170 mL        Physical Exam :-  Constitutional: NAD  Neck: Supple.  Respiratory: Bilateral equal breath sounds,  Cardiovascular: S1, S2 normal,  Gastrointestinal: Bowel Sounds present, distended and tender  Extremities: + edema  Neurological: Alert and Oriented x 3, no focal deficits  Psychiatric: Normal mood, normal affect    LABS:  09-08    127<L>  |  85<L>  |  17  ----------------------------<  75  3.8   |  25  |  0.44<L>    Ca    7.8<L>      08 Sep 2020 05:47  Phos  2.6     09-06  Mg     1.7     09-07    TPro  4.9<L>  /  Alb  3.5  /  TBili  12.5<H>  /  DBili      /  AST  461<H>  /  ALT  100<H>  /  AlkPhos  585<H>  09-08    Creatinine Trend: 0.44 <--, 0.39 <--, 0.32 <--, 0.38 <--, 0.40 <--, 0.53 <--, 0.37 <--, 0.41 <--, 0.47 <--                        8.7    2.79  )-----------( 49       ( 08 Sep 2020 05:47 )             26.9     Urine Studies:    Osmolality, Random Urine: 357 mosm/Kg (09-07 @ 19:20)  Sodium, Random Urine: <35 mmol/L (09-07 @ 11:17)  Osmolality, Random Urine: 738 mosm/Kg (09-04 @ 04:46)  Potassium, Random Urine: 64 mmol/L (09-03 @ 20:24)  Sodium, Random Urine: <35 mmol/L (09-03 @ 20:24)  Chloride, Random Urine: <35 mmol/L (09-03 @ 20:24)  Creatinine, Random Urine: 119 mg/dL (09-03 @ 20:24)  Protein/Creatinine Ratio Calculation: 0.8 Ratio (09-03 @ 20:24)    RADIOLOGY & ADDITIONAL STUDIES:

## 2020-09-08 NOTE — CHART NOTE - NSCHARTNOTEFT_GEN_A_CORE
9/8/2020 @ 1159pm  Called by RN, bladder distention, bladder scan revealed urine > 1000cc  S/P metastatic breast cancer, Ascites  A/P Urinary retention  Straight cath x 1  Monitor U/O  Maria Elena Dennison, ANP-C  [phone]

## 2020-09-08 NOTE — CONSULT NOTE ADULT - SUBJECTIVE AND OBJECTIVE BOX
Interventional Radiology Consult Note       HPI:  56yo female pt with PMHx of Breast Ca (dx 2005 with mets to liver, lung, bone, last dose Cytoxan was 3 weeks ago), GERD p/w AMS. Pt states she does remember why her brother brought her to the hospital but thinks he was concerned. As per ER conversation with brother, pt seemed more confused than usual over 2 days and concerned him. This AM she was more confused and not answering questions properly. Pt states she feels fatigued now but otherwise okay. Denies any recent complaints such as fever, chills, cough, abdominal pain, diarrhea. Denies any sick contacts as well. Pt does endorse doubling her dose of dilaudid 2 days ago to better help control bone pain from mets.    In ER: Given IV zosyn, Vancomycin,  (30 Aug 2020 02:08)      Vital Signs: Vital Signs Last 24 Hrs  T(C): 36.9 (08 Sep 2020 13:26), Max: 36.9 (08 Sep 2020 13:26)  T(F): 98.4 (08 Sep 2020 13:26), Max: 98.4 (08 Sep 2020 13:26)  HR: 107 (08 Sep 2020 13:26) (104 - 108)  BP: 92/57 (08 Sep 2020 13:26) (90/56 - 119/79)  BP(mean): --  RR: 20 (08 Sep 2020 13:26) (18 - 20)  SpO2: 92% (08 Sep 2020 13:26) (92% - 98%)    Past Medical/ Surgical History: PAST MEDICAL & SURGICAL HISTORY:  H/O pleural effusion: Left treated 9/2019 s/p PleurX removed in 12/2019  Breast Cancer h/o chemo and radiation in 2005  HPV (Human Papillomavirus) in 2004  GERD (Gastroesophageal Reflux Disease)  H/O: Osteoarthritis  Congenital Hip Dysplasia  Hyperlipidemia - allergic to all statin drugs - on no meds  H/O pleural effusion: S/P Pleurx catheter and medi-port placement 9/6/2019  Cryo ablation of cervix in 2006  h/o  Breast Lumpectomyn - left 2005  Breast Cancer - chemoport insertion  and removal 2005 and 2006  H/O: Myomectomy 2003  History of Total Hip Replacement left 2003: right thr 2011      Allergies: Allergies    Crestor (Joint Pain; Other)  HMG-CoA reductase inhibitors (Unknown)  Lipitor (Rash)  statins (Joint Pain; Rash)  Zocor (Joint Pain; Other)    Intolerances        Medications: MEDICATIONS  (STANDING):  chlorhexidine 4% Liquid 1 Application(s) Topical daily  dexAMETHasone  Injectable 2 milliGRAM(s) IV Push two times a day  HYDROmorphone PCA (1 mG/mL) 30 milliLiter(s) PCA Continuous PCA Continuous  pantoprazole    Tablet 40 milliGRAM(s) Oral before breakfast  senna 2 Tablet(s) Oral at bedtime  sodium chloride 0.9% lock flush 3 milliLiter(s) IV Push every 8 hours    MEDICATIONS  (PRN):  HYDROmorphone PCA (1 mG/mL) Rescue Clinician Bolus 1 milliGRAM(s) IV Push every 2 hours PRN Severe Pain (7 - 10)  LORazepam   Injectable 0.25 milliGRAM(s) IV Push every 2 hours PRN Anxiety  ondansetron Injectable 4 milliGRAM(s) IV Push every 6 hours PRN Nausea and/or Vomiting  polyethylene glycol 3350 17 Gram(s) Oral daily PRN Constipation      SOCIAL HISTORY:    FAMILY HISTORY:  FH: type 2 diabetes        PHYSICAL EXAM:    General:   Well-groomed, well-nourished, in no distress  Lungs:  CTA bilaterally  Cardiovascular:   S1, S2,   Abdomen:  Soft, non-tender, non-distended,   Extremities:  no calf tenderness/swelling bilaterally  Musculoskeletal:  Full ROM in all joints w/o swelling/tenderness/effusion  Neuro/Psych:  A &O x 3    LABS:                        8.7    2.79  )-----------( 49       ( 08 Sep 2020 05:47 )             26.9     09-08    127<L>  |  85<L>  |  17  ----------------------------<  75  3.8   |  25  |  0.44<L>    Ca    7.8<L>      08 Sep 2020 05:47  Phos  2.6     09-06  Mg     1.7     09-07    TPro  4.9<L>  /  Alb  3.5  /  TBili  12.5<H>  /  DBili  x   /  AST  461<H>  /  ALT  100<H>  /  AlkPhos  585<H>  09-08    PT/INR - ( 08 Sep 2020 11:17 )   PT: 22.2 sec;   INR: 1.92 ratio         PTT - ( 08 Sep 2020 11:17 )  PTT:33.6 sec      RADIOLOGY & ADDITIONAL STUDIES:    IR consulted for: pleurx placement    A/P:  56yo female pt with PMHx of Breast Ca (dx 2005 with mets to liver, lung, bone, last dose Cytoxan was 3 weeks ago), GERD p/w AMS patient was found to have ascites and underwent paracentesis. Patient is under palliative care and they requires drainage again one week later.  IR consulted to perform pleurx placement    -Place IR procedure order in sunrise under attending Dr. Parrish, pleurex placement to be performed if INR <1.5 and plt >50 tomorrow  - INR is now 1.92 - give vitamin k and FFP PRN to reduce INR to less than 1.5  - CBC and coags in AM, plt transfusion as needed prior to procedure  -NPO  -HOLD anticoagulation Interventional Radiology Consult Note       HPI:  54yo female pt with PMHx of Breast Ca (dx 2005 with mets to liver, lung, bone, last dose Cytoxan was 3 weeks ago), GERD p/w AMS. Pt states she does remember why her brother brought her to the hospital but thinks he was concerned. As per ER conversation with brother, pt seemed more confused than usual over 2 days and concerned him. This AM she was more confused and not answering questions properly. Pt states she feels fatigued now but otherwise okay. Denies any recent complaints such as fever, chills, cough, abdominal pain, diarrhea. Denies any sick contacts as well. Pt does endorse doubling her dose of dilaudid 2 days ago to better help control bone pain from mets.    In ER: Given IV zosyn, Vancomycin,  (30 Aug 2020 02:08)      Vital Signs: Vital Signs Last 24 Hrs  T(C): 36.9 (08 Sep 2020 13:26), Max: 36.9 (08 Sep 2020 13:26)  T(F): 98.4 (08 Sep 2020 13:26), Max: 98.4 (08 Sep 2020 13:26)  HR: 107 (08 Sep 2020 13:26) (104 - 108)  BP: 92/57 (08 Sep 2020 13:26) (90/56 - 119/79)  BP(mean): --  RR: 20 (08 Sep 2020 13:26) (18 - 20)  SpO2: 92% (08 Sep 2020 13:26) (92% - 98%)    Past Medical/ Surgical History: PAST MEDICAL & SURGICAL HISTORY:  H/O pleural effusion: Left treated 9/2019 s/p PleurX removed in 12/2019  Breast Cancer h/o chemo and radiation in 2005  HPV (Human Papillomavirus) in 2004  GERD (Gastroesophageal Reflux Disease)  H/O: Osteoarthritis  Congenital Hip Dysplasia  Hyperlipidemia - allergic to all statin drugs - on no meds  H/O pleural effusion: S/P Pleurx catheter and medi-port placement 9/6/2019  Cryo ablation of cervix in 2006  h/o  Breast Lumpectomyn - left 2005  Breast Cancer - chemoport insertion  and removal 2005 and 2006  H/O: Myomectomy 2003  History of Total Hip Replacement left 2003: right thr 2011      Allergies: Allergies    Crestor (Joint Pain; Other)  HMG-CoA reductase inhibitors (Unknown)  Lipitor (Rash)  statins (Joint Pain; Rash)  Zocor (Joint Pain; Other)    Intolerances        Medications: MEDICATIONS  (STANDING):  chlorhexidine 4% Liquid 1 Application(s) Topical daily  dexAMETHasone  Injectable 2 milliGRAM(s) IV Push two times a day  HYDROmorphone PCA (1 mG/mL) 30 milliLiter(s) PCA Continuous PCA Continuous  pantoprazole    Tablet 40 milliGRAM(s) Oral before breakfast  senna 2 Tablet(s) Oral at bedtime  sodium chloride 0.9% lock flush 3 milliLiter(s) IV Push every 8 hours    MEDICATIONS  (PRN):  HYDROmorphone PCA (1 mG/mL) Rescue Clinician Bolus 1 milliGRAM(s) IV Push every 2 hours PRN Severe Pain (7 - 10)  LORazepam   Injectable 0.25 milliGRAM(s) IV Push every 2 hours PRN Anxiety  ondansetron Injectable 4 milliGRAM(s) IV Push every 6 hours PRN Nausea and/or Vomiting  polyethylene glycol 3350 17 Gram(s) Oral daily PRN Constipation      SOCIAL HISTORY:    FAMILY HISTORY:  FH: type 2 diabetes        PHYSICAL EXAM:    General:   Well-groomed, well-nourished, in no distress  Lungs:  CTA bilaterally  Cardiovascular:   S1, S2,   Abdomen:  Soft, non-tender, non-distended,   Extremities:  no calf tenderness/swelling bilaterally  Musculoskeletal:  Full ROM in all joints w/o swelling/tenderness/effusion  Neuro/Psych:  A &O x 3    LABS:                        8.7    2.79  )-----------( 49       ( 08 Sep 2020 05:47 )             26.9     09-08    127<L>  |  85<L>  |  17  ----------------------------<  75  3.8   |  25  |  0.44<L>    Ca    7.8<L>      08 Sep 2020 05:47  Phos  2.6     09-06  Mg     1.7     09-07    TPro  4.9<L>  /  Alb  3.5  /  TBili  12.5<H>  /  DBili  x   /  AST  461<H>  /  ALT  100<H>  /  AlkPhos  585<H>  09-08    PT/INR - ( 08 Sep 2020 11:17 )   PT: 22.2 sec;   INR: 1.92 ratio         PTT - ( 08 Sep 2020 11:17 )  PTT:33.6 sec      RADIOLOGY & ADDITIONAL STUDIES:    IR consulted for: pleurx placement    A/P:  54yo female pt with PMHx of Breast Ca (dx 2005 with mets to liver, lung, bone, last dose Cytoxan was 3 weeks ago), GERD p/w AMS patient was found to have ascites and underwent paracentesis. Patient is under palliative care and they requires drainage again one week later.  IR consulted to perform pleurx placement    -Place IR procedure order in sunrise under attending Dr. Parrish, pleurex placement to be performed if INR <1.5 and plt >50 tomorrow  - INR is now 1.92 - give vitamin k and FFP PRN to reduce INR to less than 1.5  - CBC and coags in AM, plt transfusion as needed prior to procedure  -NPO  -HOLD anticoagulation  -needs active type and screen

## 2020-09-08 NOTE — PROGRESS NOTE ADULT - PROBLEM SELECTOR PLAN 2
-Slight improvement s/p para. However her abdominal pain is now more crampy than pressure  -Dilaudid PCA as above -Slight improvement s/p para. However her abdominal pain is now more crampy than pressure with dyspnea   -Dilaudid PCA as above  -Pleurx Cath

## 2020-09-08 NOTE — PROGRESS NOTE ADULT - SUBJECTIVE AND OBJECTIVE BOX
SUBJECTIVE AND OBJECTIVE:  INTERVAL HPI/OVERNIGHT EVENTS:    DNR on chart: Yes    Allergies    Crestor (Joint Pain; Other)  HMG-CoA reductase inhibitors (Unknown)  Lipitor (Rash)  statins (Joint Pain; Rash)  Zocor (Joint Pain; Other)    Intolerances    MEDICATIONS  (STANDING):  chlorhexidine 4% Liquid 1 Application(s) Topical daily  HYDROmorphone PCA (1 mG/mL) 30 milliLiter(s) PCA Continuous PCA Continuous  pantoprazole    Tablet 40 milliGRAM(s) Oral before breakfast  senna 2 Tablet(s) Oral at bedtime  sodium chloride 0.9% lock flush 3 milliLiter(s) IV Push every 8 hours    MEDICATIONS  (PRN):  HYDROmorphone PCA (1 mG/mL) Rescue Clinician Bolus 1 milliGRAM(s) IV Push every 2 hours PRN Severe Pain (7 - 10)  LORazepam   Injectable 0.25 milliGRAM(s) IV Push every 2 hours PRN Anxiety  ondansetron Injectable 4 milliGRAM(s) IV Push every 6 hours PRN Nausea and/or Vomiting  polyethylene glycol 3350 17 Gram(s) Oral daily PRN Constipation      ITEMS UNCHECKED ARE NOT PRESENT    PRESENT SYMPTOMS: [ ]Unable to obtain due to poor mentation   Source if other than patient:  [ ]Family   [ ]Team     Pain:  [ ]yes [ ]no  QOL impact -   Location -                    Aggravating factors -  Quality -  Radiation -  Timing-  Severity (0-10 scale):  Minimal acceptable level (0-10 scale):     Dyspnea:                           [ ]Mild [ ]Moderate [ ]Severe  Anxiety:                             [ ]Mild [ ]Moderate [ ]Severe  Fatigue:                             [ ]Mild [ ]Moderate [ ]Severe  Nausea:                             [ ]Mild [ ]Moderate [ ]Severe  Loss of appetite:              [ ]Mild [ ]Moderate [ ]Severe  Constipation:                    [ ]Mild [ ]Moderate [ ]Severe    CPOT:    https://www.[facility].org/getattachment/[email or web address]/Critical-Care-Pain-Observation-Tool-(CPOT)    PAIN AD Score:	  http://geriatrictoolkit.[place].[facility]/cog/painad.pdf (Ctrl + left click to view)    Other Symptoms:  [ ]All other review of systems negative     Palliative Performance Status Version 2:         %      http://npcrc.org/files/news/palliative_performance_scale_ppsv2.pdf  PHYSICAL EXAM:  Vital Signs Last 24 Hrs  T(C): 36.3 (08 Sep 2020 09:14), Max: 36.8 (07 Sep 2020 14:00)  T(F): 97.3 (08 Sep 2020 09:14), Max: 98.2 (07 Sep 2020 14:00)  HR: 104 (08 Sep 2020 10:04) (104 - 108)  BP: 106/68 (08 Sep 2020 10:04) (90/56 - 119/79)  BP(mean): --  RR: 18 (08 Sep 2020 09:14) (18 - 19)  SpO2: 95% (08 Sep 2020 09:14) (95% - 98%) I&O's Summary    07 Sep 2020 07:01  -  08 Sep 2020 07:00  --------------------------------------------------------  IN: 540 mL / OUT: 750 mL / NET: -210 mL    08 Sep 2020 07:01  -  08 Sep 2020 12:06  --------------------------------------------------------  IN: 70 mL / OUT: 0 mL / NET: 70 mL       GENERAL:  [ ]Alert  [ ]Oriented x   [ ]Lethargic  [ ]Cachexia  [ ]Unarousable  [ ]Verbal  [ ]Non-Verbal  Behavioral:   [ ]Anxiety  [ ]Delirium [ ]Agitation [ ]Other  HEENT:  [ ]Normal   [ ]Dry mouth   [ ]ET Tube/Trach  [ ]Oral lesions  PULMONARY:   [ ]Clear [ ]Tachypnea  [ ]Audible excessive secretions   [ ]Rhonchi        [ ]Right [ ]Left [ ]Bilateral  [ ]Crackles        [ ]Right [ ]Left [ ]Bilateral  [ ]Wheezing     [ ]Right [ ]Left [ ]Bilateral  [ ]Diminished BS [ ] Right [ ]Left [ ]Bilateral  CARDIOVASCULAR:    [ ]Regular [ ]Irregular [ ]Tachy  [ ]Colin [ ]Murmur [ ]Other  GASTROINTESTINAL:  [ ]Soft  [ ]Distended   [ ]+BS  [ ]Non tender [ ]Tender  [ ]PEG [ ]OGT/ NGT   Last BM:      GENITOURINARY:  [ ]Normal [ ]Incontinent   [ ]Oliguria/Anuria   [ ]Ng  MUSCULOSKELETAL:   [ ]Normal   [ ]Weakness  [ ]Bed/Wheelchair bound [ ]Edema  NEUROLOGIC:   [ ]No focal deficits  [ ] Cognitive impairment  [ ] Dysphagia [ ]Dysarthria [ ] Paresis [ ]Other   SKIN:   [ ]Normal  [ ]Rash   [ ]Pressure ulcer(s) [ ]y [ ]n present on admission    CRITICAL CARE:  [ ]Shock Present  [ ]Septic [ ]Cardiogenic [ ]Neurologic [ ]Hypovolemic  [ ]Vasopressors [ ]Inotropes  [ ]Respiratory failure present [ ]Mechanical Ventilation [ ]Non-invasive ventilatory support [ ]High-Flow  [ ]Acute  [ ]Chronic [ ]Hypoxic  [ ]Hypercarbic [ ]Other  [ ]Other organ failure     LABS:                        8.7    2.79  )-----------( 49       ( 08 Sep 2020 05:47 )             26.9   09-08    127<L>  |  85<L>  |  17  ----------------------------<  75  3.8   |  25  |  0.44<L>    Ca    7.8<L>      08 Sep 2020 05:47  Phos  2.6     09-06  Mg     1.7     09-07    TPro  4.9<L>  /  Alb  3.5  /  TBili  12.5<H>  /  DBili  x   /  AST  461<H>  /  ALT  100<H>  /  AlkPhos  585<H>  09-08  PT/INR - ( 08 Sep 2020 11:17 )   PT: 22.2 sec;   INR: 1.92 ratio         PTT - ( 08 Sep 2020 11:17 )  PTT:33.6 sec      RADIOLOGY & ADDITIONAL STUDIES:    Protein Calorie Malnutrition Present: [ ]mild [ ]moderate [ ]severe [ ]underweight [ ]morbid obesity  https://www.andeal.org/vault/2440/web/files/ONC/Table_Clinical%20Characteristics%20to%20Document%20Malnutrition-White%20JV%20et%20al%518973.pdf    Height (cm): 154.9 (08-30-20 @ 03:36), 154.9 (07-02-20 @ 20:00), 154.94 (05-28-20 @ 09:16)  Weight (kg): 65.7 (08-30-20 @ 03:36), 76.7 (07-02-20 @ 20:00), 72.6 (05-28-20 @ 09:16)  BMI (kg/m2): 27.4 (08-30-20 @ 03:36), 32 (07-02-20 @ 20:00), 30.2 (05-28-20 @ 09:16)    [ ]PPSV2 < or = 30%  [ ]significant weight loss [ ]poor nutritional intake [ ]anasarca   Albumin, Serum: 3.5 g/dL (09-08-20 @ 05:47)   [ ]Artificial Nutrition    REFERRALS:   [ ]Chaplaincy  [ ]Hospice  [ ]Child Life  [ ]Social Work  [ ]Case management [ ]Holistic Therapy     Goals of Care Document:      ______________    Tyron Nance M.D.  Hospice and Palliative Medicine Fellow  Pager [phone] SUBJECTIVE AND OBJECTIVE:  INTERVAL HPI/OVERNIGHT EVENTS:  Pt overnight with increased dyspnea. Pt states she felt like she was dying. She mentions her abdomen has been further distended and finds it difficult to be in certain positions. She denies abdominal pain and rib pain. She is tolerating water/ ice chips but does not have appetite. Her last BM was 3 days ago, she mentions refusing miralax because last time she took it she had a lot of diarrhea, thus would like to hold off on it unless she really needs it. Pt mentions improved pain with PCA. She required 18 inj with 32 attempts.    DNR on chart: Yes    Allergies  Crestor (Joint Pain; Other)  HMG-CoA reductase inhibitors (Unknown)  Lipitor (Rash)  statins (Joint Pain; Rash)  Zocor (Joint Pain; Other)      MEDICATIONS  (STANDING):  chlorhexidine 4% Liquid 1 Application(s) Topical daily  HYDROmorphone PCA (1 mG/mL) 30 milliLiter(s) PCA Continuous PCA Continuous  pantoprazole    Tablet 40 milliGRAM(s) Oral before breakfast  senna 2 Tablet(s) Oral at bedtime  sodium chloride 0.9% lock flush 3 milliLiter(s) IV Push every 8 hours    MEDICATIONS  (PRN):  HYDROmorphone PCA (1 mG/mL) Rescue Clinician Bolus 1 milliGRAM(s) IV Push every 2 hours PRN Severe Pain (7 - 10)  LORazepam   Injectable 0.25 milliGRAM(s) IV Push every 2 hours PRN Anxiety  ondansetron Injectable 4 milliGRAM(s) IV Push every 6 hours PRN Nausea and/or Vomiting  polyethylene glycol 3350 17 Gram(s) Oral daily PRN Constipation      ITEMS UNCHECKED ARE NOT PRESENT    PRESENT SYMPTOMS: [ ]Unable to obtain due to poor mentation   Source if other than patient:  [ ]Family   [ ]Team     Pain:  [ ]yes [X ]no  QOL impact -   Location -                    Aggravating factors -  Quality -  Radiation -  Timing-  Severity (0-10 scale):  Minimal acceptable level (0-10 scale):     Dyspnea:                           [ ]Mild [X ]Moderate [ ]Severe  Anxiety:                             [ ]Mild [ ]Moderate [ ]Severe  Fatigue:                             [ ]Mild [ ]Moderate [ ]Severe  Nausea:                             [ ]Mild [ ]Moderate [ ]Severe  Loss of appetite:              [ ]Mild [ ]Moderate [ ]Severe  Constipation:                    [ ]Mild [ ]Moderate [ ]Severe    CPOT:    https://www.[facility].org/getattachment/[email or web address]/Critical-Care-Pain-Observation-Tool-(CPOT)    PAIN AD Score:	  http://geriatrictoolkit.[facility]/cog/painad.pdf (Ctrl + left click to view)    Other Symptoms:  [ ]All other review of systems negative     Palliative Performance Status Version 2:  20%      http://[facility].org/files/news/palliative_performance_scale_ppsv2.pdf  PHYSICAL EXAM:  Vital Signs Last 24 Hrs  T(C): 36.3 (08 Sep 2020 09:14), Max: 36.8 (07 Sep 2020 14:00)  T(F): 97.3 (08 Sep 2020 09:14), Max: 98.2 (07 Sep 2020 14:00)  HR: 104 (08 Sep 2020 10:04) (104 - 108)  BP: 106/68 (08 Sep 2020 10:04) (90/56 - 119/79)  RR: 18 (08 Sep 2020 09:14) (18 - 19)  SpO2: 95% (08 Sep 2020 09:14) (95% - 98%) I&O's Summary    07 Sep 2020 07:01  -  08 Sep 2020 07:00  --------------------------------------------------------  IN: 540 mL / OUT: 750 mL / NET: -210 mL    08 Sep 2020 07:01  -  08 Sep 2020 12:06  --------------------------------------------------------  IN: 70 mL / OUT: 0 mL / NET: 70 mL       GENERAL:  [X ]Alert  [X ]Oriented x3   [ ]Lethargic  [ ]Cachexia  [ ]Unarousable  [ X]Verbal  [ ]Non-Verbal  Behavioral:   [X ]Anxiety  [ ]Delirium [ ]Agitation [ ]Other  HEENT:  [ ]Normal   [X ]Dry mouth   [ ]ET Tube/Trach  [ ]Oral lesions  PULMONARY:   [ X]Clear [ ]Tachypnea  [ ]Audible excessive secretions   [ ]Rhonchi        [ ]Right [ ]Left [ ]Bilateral  [ ]Crackles        [ ]Right [ ]Left [ ]Bilateral  [ ]Wheezing     [ ]Right [ ]Left [ ]Bilateral  [ ]Diminished BS [ ] Right [ ]Left [ ]Bilateral  CARDIOVASCULAR:    [X ]Regular [ ]Irregular [ ]Tachy  [ ]Colin [ ]Murmur [ ]Other  GASTROINTESTINAL:  [X ]Soft  [X ]Distended   [ ]+BS  [ X]Non tender [ ]Tender  [ ]PEG [ ]OGT/ NGT   Last BM: 8/5/2020     GENITOURINARY:  [X ]Normal [ ]Incontinent   [ ]Oliguria/Anuria   [ ]Ng  MUSCULOSKELETAL:   [ ]Normal   [X ]Weakness  [ ]Bed/Wheelchair bound [ ]Edema  NEUROLOGIC:   X[ ]No focal deficits  [ ] Cognitive impairment  [ ] Dysphagia [ ]Dysarthria [ ] Paresis [ ]Other   SKIN:   [X ]Normal  [ ]Rash   [ ]Pressure ulcer(s) [ ]y [ ]n present on admission    CRITICAL CARE:  [ ]Shock Present  [ ]Septic [ ]Cardiogenic [ ]Neurologic [ ]Hypovolemic  [ ]Vasopressors [ ]Inotropes  [ ]Respiratory failure present [ ]Mechanical Ventilation [ ]Non-invasive ventilatory support [ ]High-Flow  [ ]Acute  [ ]Chronic [ ]Hypoxic  [ ]Hypercarbic [ ]Other  [ ]Other organ failure     LABS:                        8.7    2.79  )-----------( 49       ( 08 Sep 2020 05:47 )             26.9   09-08    127<L>  |  85<L>  |  17  ----------------------------<  75  3.8   |  25  |  0.44<L>    Ca    7.8<L>      08 Sep 2020 05:47  Phos  2.6     09-06  Mg     1.7     09-07    TPro  4.9<L>  /  Alb  3.5  /  TBili  12.5<H>  /  DBili  x   /  AST  461<H>  /  ALT  100<H>  /  AlkPhos  585<H>  09-08  PT/INR - ( 08 Sep 2020 11:17 )   PT: 22.2 sec;   INR: 1.92 ratio    PTT - ( 08 Sep 2020 11:17 )  PTT:33.6 sec  RADIOLOGY & ADDITIONAL STUDIES:    Protein Calorie Malnutrition Present: [ ]mild [ ]moderate [ ]severe [ ]underweight [ ]morbid obesity  https://www.andeal.org/vault/2440/web/files/ONC/Table_Clinical%20Characteristics%20to%20Document%20Malnutrition-White%20JV%20et%20al%323825.pdf    Height (cm): 154.9 (08-30-20 @ 03:36), 154.9 (07-02-20 @ 20:00), 154.94 (05-28-20 @ 09:16)  Weight (kg): 65.7 (08-30-20 @ 03:36), 76.7 (07-02-20 @ 20:00), 72.6 (05-28-20 @ 09:16)  BMI (kg/m2): 27.4 (08-30-20 @ 03:36), 32 (07-02-20 @ 20:00), 30.2 (05-28-20 @ 09:16)    [ ]PPSV2 < or = 30%  [ ]significant weight loss [ ]poor nutritional intake [ ]anasarca   Albumin, Serum: 3.5 g/dL (09-08-20 @ 05:47)   [ ]Artificial Nutrition    REFERRALS:   [ ]Chaplaincy  [ ]Hospice  [ ]Child Life  [ ]Social Work  [ ]Case management [ ]Holistic Therapy     Goals of Care Document:      ______________    Tyron Nance M.D.  Hospice and Palliative Medicine Fellow  Pager [phone] SUBJECTIVE AND OBJECTIVE:  INTERVAL HPI/OVERNIGHT EVENTS:  Pt overnight with increased dyspnea. Pt states she felt like she was dying. She mentions her abdomen has been further distended and finds it difficult to be in certain position. Furthermore, she was c/o dyspnea that improves with positional changes. She denies abdominal pain and rib pain. She is tolerating water/ ice chips but does not have appetite. Her last BM was 3 days ago, she mentions refusing miralax because last time she took it she had a lot of diarrhea, thus would like to hold off on it unless she really needs it. Pt mentions improved pain with PCA. She required 18 inj with 32 attempts.    DNR on chart: Yes    Allergies  Crestor (Joint Pain; Other)  HMG-CoA reductase inhibitors (Unknown)  Lipitor (Rash)  statins (Joint Pain; Rash)  Zocor (Joint Pain; Other)      MEDICATIONS  (STANDING):  chlorhexidine 4% Liquid 1 Application(s) Topical daily  HYDROmorphone PCA (1 mG/mL) 30 milliLiter(s) PCA Continuous PCA Continuous  pantoprazole    Tablet 40 milliGRAM(s) Oral before breakfast  senna 2 Tablet(s) Oral at bedtime  sodium chloride 0.9% lock flush 3 milliLiter(s) IV Push every 8 hours    MEDICATIONS  (PRN):  HYDROmorphone PCA (1 mG/mL) Rescue Clinician Bolus 1 milliGRAM(s) IV Push every 2 hours PRN Severe Pain (7 - 10)  LORazepam   Injectable 0.25 milliGRAM(s) IV Push every 2 hours PRN Anxiety  ondansetron Injectable 4 milliGRAM(s) IV Push every 6 hours PRN Nausea and/or Vomiting  polyethylene glycol 3350 17 Gram(s) Oral daily PRN Constipation      ITEMS UNCHECKED ARE NOT PRESENT    PRESENT SYMPTOMS: [ ]Unable to obtain due to poor mentation   Source if other than patient:  [ ]Family   [ ]Team     Pain:  [ ]yes [X ]no  QOL impact -   Location -                    Aggravating factors -  Quality -  Radiation -  Timing-  Severity (0-10 scale):  Minimal acceptable level (0-10 scale):     Dyspnea:                           [ ]Mild [X ]Moderate [ ]Severe  Anxiety:                             [ ]Mild [ ]Moderate [ ]Severe  Fatigue:                             [ ]Mild [ ]Moderate [ ]Severe  Nausea:                             [ ]Mild [ ]Moderate [ ]Severe  Loss of appetite:              [ ]Mild [ ]Moderate [ ]Severe  Constipation:                    [ ]Mild [ ]Moderate [ ]Severe    CPOT:    https://www.[facility].org/getattachment/[email or web address]/Critical-Care-Pain-Observation-Tool-(CPOT)    PAIN AD Score:	  http://geriatrictoolkit.[facility]/cog/painad.pdf (Ctrl + left click to view)    Other Symptoms:  [x]All other review of systems negative     Palliative Performance Status Version 2:  20%      http://[facility].org/files/news/palliative_performance_scale_ppsv2.pdf  PHYSICAL EXAM:  Vital Signs Last 24 Hrs  T(C): 36.3 (08 Sep 2020 09:14), Max: 36.8 (07 Sep 2020 14:00)  T(F): 97.3 (08 Sep 2020 09:14), Max: 98.2 (07 Sep 2020 14:00)  HR: 104 (08 Sep 2020 10:04) (104 - 108)  BP: 106/68 (08 Sep 2020 10:04) (90/56 - 119/79)  RR: 18 (08 Sep 2020 09:14) (18 - 19)  SpO2: 95% (08 Sep 2020 09:14) (95% - 98%) I&O's Summary    07 Sep 2020 07:01  -  08 Sep 2020 07:00  --------------------------------------------------------  IN: 540 mL / OUT: 750 mL / NET: -210 mL    08 Sep 2020 07:01  -  08 Sep 2020 12:06  --------------------------------------------------------  IN: 70 mL / OUT: 0 mL / NET: 70 mL       GENERAL:  [X ]Alert  [X ]Oriented x3   [ ]Lethargic  [ ]Cachexia  [ ]Unarousable  [ X]Verbal  [ ]Non-Verbal  Behavioral:   [X ]Anxiety  [ ]Delirium [ ]Agitation [ ]Other  HEENT:  [ ]Normal   [X ]Dry mouth   [ ]ET Tube/Trach  [ ]Oral lesions  PULMONARY:   [ X]Clear [ ]Tachypnea  [ ]Audible excessive secretions   [ ]Rhonchi        [ ]Right [ ]Left [ ]Bilateral  [ ]Crackles        [ ]Right [ ]Left [ ]Bilateral  [ ]Wheezing     [ ]Right [ ]Left [ ]Bilateral  [ ]Diminished BS [ ] Right [ ]Left [ ]Bilateral  CARDIOVASCULAR:    [X ]Regular [ ]Irregular [ ]Tachy  [ ]Colin [ ]Murmur [ ]Other  GASTROINTESTINAL:  [X ]Soft  [X ]Distended   [ ]+BS  [ X]Non tender [ ]Tender  [ ]PEG [ ]OGT/ NGT   Last BM: 8/5/2020     GENITOURINARY:  [X ]Normal [ ]Incontinent   [ ]Oliguria/Anuria   [ ]Ng  MUSCULOSKELETAL:   [ ]Normal   [X ]Weakness  [ ]Bed/Wheelchair bound [ ]Edema  NEUROLOGIC:   X[ ]No focal deficits  [ ] Cognitive impairment  [ ] Dysphagia [ ]Dysarthria [ ] Paresis [ ]Other   SKIN:   [X ]Normal  [ ]Rash   [ ]Pressure ulcer(s) [ ]y [ ]n present on admission    CRITICAL CARE:  [ ]Shock Present  [ ]Septic [ ]Cardiogenic [ ]Neurologic [ ]Hypovolemic  [ ]Vasopressors [ ]Inotropes  [ ]Respiratory failure present [ ]Mechanical Ventilation [ ]Non-invasive ventilatory support [ ]High-Flow  [ ]Acute  [ ]Chronic [ ]Hypoxic  [ ]Hypercarbic [ ]Other  [ ]Other organ failure     LABS:                        8.7    2.79  )-----------( 49       ( 08 Sep 2020 05:47 )             26.9   09-08    127<L>  |  85<L>  |  17  ----------------------------<  75  3.8   |  25  |  0.44<L>    Ca    7.8<L>      08 Sep 2020 05:47  Phos  2.6     09-06  Mg     1.7     09-07    TPro  4.9<L>  /  Alb  3.5  /  TBili  12.5<H>  /  DBili  x   /  AST  461<H>  /  ALT  100<H>  /  AlkPhos  585<H>  09-08  PT/INR - ( 08 Sep 2020 11:17 )   PT: 22.2 sec;   INR: 1.92 ratio    PTT - ( 08 Sep 2020 11:17 )  PTT:33.6 sec  RADIOLOGY & ADDITIONAL STUDIES:   < from: CT Abdomen and Pelvis w/ IV Cont (08.29.20 @ 20:41) >  EXAM:  CT ABDOMEN AND PELVIS IC                          EXAM:  CT CHEST IC                            PROCEDURE DATE:  08/29/2020  IMPRESSION:  Interval enlargement of all pleural based nodules with additional new left upper lobe pulmonary nodule, compatible with progression of metastatic disease.      Unchanged small loculated left pleural effusion with unchanged pleural thickening and enhancement.    Redemonstration of diffuse osseous metastatic disease.    Unchanged patchy low-density throughout the liver, likely hepatic steatosis, however focal hepatic lesion cannot be excluded. Contrast-enhanced liver MRI may be obtained for further evaluation as clinically warranted.              THALIA GIBBS M.D., RADIOLOGY RESIDENT  This document has been electronically signed.  RAYNA AVILA M.D., ATTENDING RADIOLOGIST  This document has been electronically signed. Aug 30 2020 12:44AM        < end of copied text >      Protein Calorie Malnutrition Present: [ ]mild [ ]moderate [ ]severe [ ]underweight [ ]morbid obesity  https://www.andeal.org/vault/2440/web/files/ONC/Table_Clinical%20Characteristics%20to%20Document%20Malnutrition-White%20JV%20et%20al%219392.pdf    Height (cm): 154.9 (08-30-20 @ 03:36), 154.9 (07-02-20 @ 20:00), 154.94 (05-28-20 @ 09:16)  Weight (kg): 65.7 (08-30-20 @ 03:36), 76.7 (07-02-20 @ 20:00), 72.6 (05-28-20 @ 09:16)  BMI (kg/m2): 27.4 (08-30-20 @ 03:36), 32 (07-02-20 @ 20:00), 30.2 (05-28-20 @ 09:16)    [ ]PPSV2 < or = 30%  [ ]significant weight loss [ ]poor nutritional intake [ ]anasarca   Albumin, Serum: 3.5 g/dL (09-08-20 @ 05:47)   [ ]Artificial Nutrition    REFERRALS:   [ ]Chaplaincy  [ ]Hospice  [ ]Child Life  [ ]Social Work  [ ]Case management [ ]Holistic Therapy     Goals of Care Document:      ______________    Tyron Nance M.D.  Hospice and Palliative Medicine Fellow  Pager [phone]

## 2020-09-08 NOTE — PROGRESS NOTE ADULT - PROBLEM SELECTOR PLAN 1
-Most likely 2/2 to metastatic disease given imaging findings  -Will switch dilaudid IV 1mg q3hr to dilaudid PCA 0.2/0.4/20//1mg  -will discontinue fentanyl 25mcg  -Will hold off on dexamethasone 2mg BID given increased risk of bleeding in the setting of thrombocytopenia, patient agrees. -Most likely 2/2 to metastatic disease given imaging findings  -increasing dilaudid PCA to 0.4 continuous and 0.6 demand dose q20 mins  -Will add on dexamethasone 2mg BID given increase in platelets -Most likely 2/2 to metastatic disease given imaging findings  -increasing dilaudid PCA to 0.4 continuous and 0.6 demand dose q20 mins  -Will add on dexamethasone 2mg BID given increase in platelets  - Patient understands her current illness and asked for maximizing symptoms   Rx even if that were to cause sedation.

## 2020-09-08 NOTE — PROGRESS NOTE ADULT - PROBLEM SELECTOR PLAN 3
-Slight improvement s/p para  -Should improve with PCA, however will re-assess -Slight improvement s/p para, will attempt para today with pleurex  -Adding ativan for anxiety caused by dyspnea, should help with dyspnea as well -Slight improvement s/p para. We are recommending a Pleurx placement and this was d/w IR>   -Adding ativan for anxiety caused by dyspnea, should help with dyspnea as well

## 2020-09-08 NOTE — PROGRESS NOTE ADULT - PROBLEM SELECTOR PLAN 1
Hx of metastatic breast cancer dx in 2015 prior chemo/RT with known mets to brain, liver, and lung.   pancytopenia  appreciate palliative care recs and discussion with family  DNR/DNI, hospice eval  dilaudid PCA, pain poorly controlled, transfer to PCU

## 2020-09-08 NOTE — PROGRESS NOTE ADULT - REASON FOR ADMISSION
Altered mental status

## 2020-09-08 NOTE — PROGRESS NOTE ADULT - SUBJECTIVE AND OBJECTIVE BOX
Patient is a 55y old  Female who presents with a chief complaint of Altered mental status (08 Sep 2020 12:06)      SUBJECTIVE / OVERNIGHT EVENTS:    Patient seen and examined. co severe pain overnight right rib. worse pain ever despite dilaudid PCA.       Vital Signs Last 24 Hrs  T(C): 36.3 (08 Sep 2020 13:11), Max: 36.8 (07 Sep 2020 14:00)  T(F): 97.3 (08 Sep 2020 13:11), Max: 98.2 (07 Sep 2020 14:00)  HR: 105 (08 Sep 2020 13:11) (104 - 108)  BP: 90/65 (08 Sep 2020 13:11) (90/56 - 119/79)  BP(mean): --  RR: 18 (08 Sep 2020 13:11) (18 - 19)  SpO2: 98% (08 Sep 2020 13:11) (95% - 98%)  I&O's Summary    07 Sep 2020 07:01  -  08 Sep 2020 07:00  --------------------------------------------------------  IN: 540 mL / OUT: 750 mL / NET: -210 mL    08 Sep 2020 07:01  -  08 Sep 2020 13:23  --------------------------------------------------------  IN: 170 mL / OUT: 0 mL / NET: 170 mL        PE:  GENERAL: NAD, AAOx3, older than stated age  HEAD:  Atraumatic, Normocephalic  CHEST/LUNG: CTABL, No wheeze  HEART: Regular rate and rhythm; no murmur  ABDOMEN: TTP, distended, Bowel sounds present  EXTREMITIES:  2+ Peripheral Pulses, No clubbing, cyanosis, or edema  SKIN: No rashes or lesions  NEURO: No focal deficits    LABS:                        8.7    2.79  )-----------( 49       ( 08 Sep 2020 05:47 )             26.9     09-08    127<L>  |  85<L>  |  17  ----------------------------<  75  3.8   |  25  |  0.44<L>    Ca    7.8<L>      08 Sep 2020 05:47  Phos  2.6     09-06  Mg     1.7     09-07    TPro  4.9<L>  /  Alb  3.5  /  TBili  12.5<H>  /  DBili  x   /  AST  461<H>  /  ALT  100<H>  /  AlkPhos  585<H>  09-08    PT/INR - ( 08 Sep 2020 11:17 )   PT: 22.2 sec;   INR: 1.92 ratio         PTT - ( 08 Sep 2020 11:17 )  PTT:33.6 sec  CAPILLARY BLOOD GLUCOSE                RADIOLOGY & ADDITIONAL TESTS:    Imaging Personally Reviewed:  [x] YES  [ ] NO    Consultant(s) Notes Reviewed:  [x] YES  [ ] NO    MEDICATIONS  (STANDING):  chlorhexidine 4% Liquid 1 Application(s) Topical daily  dexAMETHasone  Injectable 2 milliGRAM(s) IV Push two times a day  HYDROmorphone PCA (1 mG/mL) 30 milliLiter(s) PCA Continuous PCA Continuous  pantoprazole    Tablet 40 milliGRAM(s) Oral before breakfast  senna 2 Tablet(s) Oral at bedtime  sodium chloride 0.9% lock flush 3 milliLiter(s) IV Push every 8 hours    MEDICATIONS  (PRN):  HYDROmorphone PCA (1 mG/mL) Rescue Clinician Bolus 1 milliGRAM(s) IV Push every 2 hours PRN Severe Pain (7 - 10)  LORazepam   Injectable 0.25 milliGRAM(s) IV Push every 2 hours PRN Anxiety  ondansetron Injectable 4 milliGRAM(s) IV Push every 6 hours PRN Nausea and/or Vomiting  polyethylene glycol 3350 17 Gram(s) Oral daily PRN Constipation      Care Discussed with Consultants/Other Providers [x] YES  [ ] NO    HEALTH ISSUES - PROBLEM Dx:  Constipation due to opioid therapy: Constipation due to opioid therapy  Hypophosphatemia: Hypophosphatemia  Hyponatremia: Hyponatremia  Dyspnea: Dyspnea  Palliative care encounter: Palliative care encounter  Advanced directives, counseling/discussion: Advanced directives, counseling/discussion  Carcinoma of breast metastatic to multiple sites, unspecified laterality: Carcinoma of breast metastatic to multiple sites, unspecified laterality  Generalized abdominal pain: Generalized abdominal pain  Rib pain: Rib pain  Pain of metastatic malignancy: Pain of metastatic malignancy  Breast cancer metastasized to liver: Breast cancer metastasized to liver  Prophylactic measure: Prophylactic measure  Gastroesophageal reflux disease, esophagitis presence not specified: Gastroesophageal reflux disease, esophagitis presence not specified  Malignant ascites: Malignant ascites  Transaminitis: Transaminitis  Sepsis, due to unspecified organism, unspecified whether acute organ dysfunction present: Sepsis, due to unspecified organism, unspecified whether acute organ dysfunction present  Malignant neoplasm of female breast, unspecified estrogen receptor status, unspecified laterality, unspecified site of breast: Malignant neoplasm of female breast, unspecified estrogen receptor status, unspecified laterality, unspecified site of breast  Acute encephalopathy: Acute encephalopathy  Suspected deep vein thrombosis (DVT)  Suspected pulmonary embolism

## 2020-09-08 NOTE — PROGRESS NOTE ADULT - PROBLEM SELECTOR PLAN 6
-GOC held today, details above   -HCP in chart  -MOLST filled and in chart, DNR/DNI  -c/w  Dilaudid PCA for symptom control  -hospice referral for home hospice sent  -Plan for transfer to PCU -Spoke with Sister Bessy, she agrees with PCU transfer given symptoms developed overnight  -MOLST form completed and in chart. Pt is DNR/DNI  -Hospice referral

## 2020-09-08 NOTE — CONSULT NOTE ADULT - PROVIDER SPECIALTY LIST ADULT
Intervent Radiology
Intervent Radiology
Gastroenterology
Infectious Disease
Nephrology
Palliative Care

## 2020-09-08 NOTE — PROGRESS NOTE ADULT - PROBLEM SELECTOR PROBLEM 3
Sepsis, due to unspecified organism, unspecified whether acute organ dysfunction present
Sepsis, due to unspecified organism, unspecified whether acute organ dysfunction present
Dyspnea
Sepsis, due to unspecified organism, unspecified whether acute organ dysfunction present
Sepsis, due to unspecified organism, unspecified whether acute organ dysfunction present
Breast cancer metastasized to liver
Constipation due to opioid therapy
Constipation due to opioid therapy
Dyspnea
Dyspnea
Sepsis, due to unspecified organism, unspecified whether acute organ dysfunction present
Transaminitis
Sepsis, due to unspecified organism, unspecified whether acute organ dysfunction present

## 2020-09-08 NOTE — PROGRESS NOTE ADULT - ATTENDING COMMENTS
Gonzalo Villarreal MD   Geriatrics and Palliative Care ([facility]) Consult Service    of Geriatric and Palliative Medicine  [facility]      Please page the following number for clinical matters between the hours of 9 am and 5 pm from Monday through Friday : [phone]    After 5pm and on weekends, please see the contact information below:    In the event of newly developing, evolving, or worsening symptoms, please contact the Palliative Medicine team via pager (if the patient is at [facility] #[phone] or if the patient is at [facility] #[phone]) The Geriatric and Palliative Medicine service has coverage 24 hours a day/ 7 days a week to provide medical recommendations regarding symptom management needs via telephone

## 2020-09-08 NOTE — PROGRESS NOTE ADULT - PROBLEM SELECTOR PLAN 2
likely 2/2 metastatic dz  sp diag and therapeutic paracentesis 9/3  cultures NTD  BCX and UCx neg  abd pleurx tomorrow by IR for malignant ascites

## 2020-09-08 NOTE — PROGRESS NOTE ADULT - PROBLEM SELECTOR PROBLEM 1
Acute encephalopathy
Rib pain
Acute encephalopathy
Hyponatremia
Malignant neoplasm of female breast, unspecified estrogen receptor status, unspecified laterality, unspecified site of breast
Rib pain
Acute encephalopathy
Acute encephalopathy

## 2020-09-08 NOTE — PROGRESS NOTE ADULT - PROBLEM SELECTOR PROBLEM 2
Malignant neoplasm of female breast, unspecified estrogen receptor status, unspecified laterality, unspecified site of breast
Generalized abdominal pain
Malignant neoplasm of female breast, unspecified estrogen receptor status, unspecified laterality, unspecified site of breast
Malignant neoplasm of female breast, unspecified estrogen receptor status, unspecified laterality, unspecified site of breast
Generalized abdominal pain
Hypophosphatemia
Malignant ascites
Malignant neoplasm of female breast, unspecified estrogen receptor status, unspecified laterality, unspecified site of breast
Sepsis, due to unspecified organism, unspecified whether acute organ dysfunction present
Malignant neoplasm of female breast, unspecified estrogen receptor status, unspecified laterality, unspecified site of breast
Malignant neoplasm of female breast, unspecified estrogen receptor status, unspecified laterality, unspecified site of breast

## 2020-09-08 NOTE — CONSULT NOTE ADULT - CONSULT REQUESTED DATE/TIME
01-Sep-2020 17:32
03-Sep-2020 12:12
03-Sep-2020 16:14
30-Aug-2020 14:17
08-Sep-2020 14:09
02-Sep-2020 12:11

## 2020-09-08 NOTE — CONSULT NOTE ADULT - CONSULT REASON
Hyponatremia
Pain management/ GOC
ascites
encephalopathy
pleurx placement
Diagnostic and therapeutic paracentesis

## 2020-09-09 NOTE — DISCHARGE NOTE FOR THE EXPIRED PATIENT - HOSPITAL COURSE
55 year old female pt with PMHx of Breast Ca (dx 2005 s/p mastectomy chemo/ 10 years of tamoxifen, leupron, and found to have mets to liver, lung, bone in 2017, last dose Cytoxan was 3 weeks ago), GERD p/w AMS most likely 2/2 to increase in opioid dosage. Low suspicion for infection. During hospital course, pt with worsening liver failure. Underwent paracentesis for malignant ascites. Pt transferred to PCU for symptom management and end of life care on 9/8.

## 2020-09-09 NOTE — CHART NOTE - NSCHARTNOTEFT_GEN_A_CORE
Called by nurse due to concerns that patient is declining.   Per discussion with Nurse Menon, patient has had a change in mental status - patient able to open eyes and denies pain but is moaning and appears uncomfortable. Last dose of Ativan was given at midnight due to patient being agitated at the time, but patient appears more agitated but also more lethargic. Patient did not tachypneic appearing, but per nurse, breathing pattern has changed with some abdominal breathing with baseline of abdominal distension. Earlier SBP-90s, however unable to obtain blood pressure now. Patient was straight cathed with only 75cc urine output in past 12 hours.   Patient with more limited ability to participate in PCA pump to press for demand doses.   Nurse Menon has contacted family about patient's decline, and family on their way to the hospital now to see patient.     Plan  - Discontinue Dilaudid 1 mg/mL PCA: Initial demand dose: 0.6 mg; Lockout: 20 minutes; Continuous rate: 0.4 mg/hour; Four hour limit: 8.8 mg; Rescue clinician bolus: 1 mg q 2 hours prn  - Start IV Dilaudid gtt @ 0.5mg/hour  - Start IV Dilaudid 1mg q1 PRN for dyspnea/ tachypnea  - Start IV Dilaudid 1mg q1 PRN for pain  - Continue with Ativan 0.25mg q2 PRN for anxiety for now; can possibly increase to Ativan 0.5mg q2 PRN for anxiety as terminal delirium may contributing to current presentation     Discussed with Dr. Juarez. Called by nurse due to concerns that patient is declining.   Per discussion with Nurse Menon, patient has had a change in mental status - patient able to open eyes and denies pain but is moaning and appears uncomfortable. Last dose of Ativan was given at midnight due to patient being agitated at the time, but patient appears more lethargic but also more agitated now. Patient is not tachypneic appearing, but per nurse, breathing pattern has changed with some abdominal breathing with baseline of abdominal distension. Earlier SBP-90s, however unable to obtain blood pressure now. Patient was straight cathed with only 75cc urine output in past 12 hours.   Patient with more limited ability to participate in PCA pump to press for demand doses currently.  Nurse Menon has contacted family about patient's decline, and family on their way to the hospital now to see patient.     Plan  - Discontinue Dilaudid 1 mg/mL PCA: Initial demand dose: 0.6 mg; Lockout: 20 minutes; Continuous rate: 0.4 mg/hour; Four hour limit: 8.8 mg; Rescue clinician bolus: 1 mg q 2 hours prn  - Start IV Dilaudid gtt @ 0.5mg/hour  - Start IV Dilaudid 1mg q1 PRN for dyspnea/ tachypnea  - Start IV Dilaudid 1mg q1 PRN for pain  - Continue with Ativan 0.25mg q2 PRN for anxiety for now; can possibly increase to Ativan 0.5mg q2 PRN for anxiety as terminal delirium may contributing to current presentation     Discussed with Dr. Juarez.

## 2020-09-09 NOTE — PROVIDER CONTACT NOTE (OTHER) - ASSESSMENT
No response to external stimuli, no spontaneous respirations, no apical heart rate, negative papillary response to light
Pt c/o pain and had SOB, VS taken POX 97-98% with o2 4LNC abdominal distention, pt has ascites.
pt is very lethargic, increase in restlessness and agitation overnight, now with dyspnea
Abd grossly distended with ascitis, bladder scan indication of >999 ml
Pt a+ox3 OOBx1 assist to walker pt denies lightheadedness or dizziness, VS WDL. Pt complains of pain 9/10 bone pain

## 2020-09-09 NOTE — PROVIDER CONTACT NOTE (OTHER) - REASON
Blood noted in urine, pt in pain
Change in mental status, acute decline in condition
Pt c/o SOB  and c/o nausea zofran 4mg IVP given as ordered
pt expiration
Pt has not voided in >8 hrs

## 2020-09-09 NOTE — PROVIDER CONTACT NOTE (OTHER) - RECOMMENDATIONS
Adhere to NP orders, recommend CBC draw, UA and administer medication for pain.
Pt pronounced dead at 0410, brother Bruno and sister at bedside. Declined autopsy
switch over to dilaudid drip

## 2020-09-09 NOTE — PROVIDER CONTACT NOTE (OTHER) - BACKGROUND
Pt has DNR order
admitted for AMS, PNA,
transferred to [facility]
metastatic cancer
Pt admitted with AMS

## 2020-09-09 NOTE — PROVIDER CONTACT NOTE (OTHER) - SITUATION
Current Discharge Medication List  
  
START taking these medications Details  
trimethoprim-sulfamethoxazole (BACTRIM DS) 160-800 mg per tablet Take 1 Tab by mouth two (2) times a day for 5 days. Qty: 10 Tab, Refills: 0 phenazopyridine (PYRIDIUM) 200 mg tablet Take 1 Tab by mouth three (3) times daily for 2 days. Qty: 5 Tab, Refills: 0  
  
ibuprofen (MOTRIN) 600 mg tablet Take 1 Tab by mouth every six (6) hours as needed for Pain. Qty: 20 Tab, Refills: 0 Patient armband removed and shredded. Prescription given and reviewed with patient.
Patient without spontaneous respirations or pulse
Pt unable to use PCA , symptoms of pain
see above
Pt unable to void
Blood noted in urine, concentrated, clots noted after wiping. Pt had stool, stool was brown in color

## 2020-09-09 NOTE — PROVIDER CONTACT NOTE (OTHER) - ACTION/TREATMENT ORDERED:
Straight cath
As per NP Chris labs to be ordered, pain medication to be ordered 2mg of dilaudid.
pt used PCA dilaudid, PCA bag changed, o2 humidified applied as ordered by PA. Pt seems and reported comfortable on her side. Continue to monitor.
Will discuss with attending
see  pt expiration note

## 2020-09-14 LAB
CORTICOSTEROID BINDING GLOBULIN RESULT: 1.7 MG/DL — SIGNIFICANT CHANGE UP
CORTIS F/TOTAL MFR SERPL: 38 % — SIGNIFICANT CHANGE UP
CORTIS SERPL-MCNC: 18 UG/DL — SIGNIFICANT CHANGE UP
CORTISOL, FREE RESULT: 6.9 UG/DL — HIGH

## 2020-10-03 LAB
CULTURE RESULTS: SIGNIFICANT CHANGE UP
SPECIMEN SOURCE: SIGNIFICANT CHANGE UP

## 2021-03-18 NOTE — DISCHARGE NOTE PROVIDER - NSDCHC_MEDRECSTATUS_GEN_ALL_CORE
Cholesterol is too high Will adjust the thyroid medications as that may be the cause Admission Reconciliation is Completed  Discharge Reconciliation is Not Complete Admission Reconciliation is Completed  Discharge Reconciliation is Completed

## 2021-04-26 NOTE — H&P ADULT - NSHPSOURCEINFORD_GEN_ALL_CORE
Chart(s)/Patient Cimetidine Pregnancy And Lactation Text: This medication is Pregnancy Category B and is considered safe during pregnancy. It is also excreted in breast milk and breast feeding isn't recommended.

## 2021-07-20 NOTE — H&P PST ADULT - ADDITIONAL PE
Patient informed/Explanation of wait Medi-Port ro Right Upper Anterior Chest and Pleurx Catheter to Left Lower Anterior Chest

## 2022-03-30 NOTE — H&P PST ADULT - NSANTHOSAYNRD_GEN_A_CORE
2
0
2
No. FREDIS screening performed.  STOP BANG Legend: 0-2 = LOW Risk; 3-4 = INTERMEDIATE Risk; 5-8 = HIGH Risk

## 2022-05-19 NOTE — ED ADULT NURSE NOTE - NSSUHOSCREENINGYN_ED_ALL_ED
well developed, well nourished , in no acute distress , ambulating without difficulty , normal communication ability
Yes - the patient is able to be screened

## 2022-08-17 NOTE — DISCHARGE NOTE NURSING/CASE MANAGEMENT/SOCIAL WORK - NSDPACMPNY_GEN_ALL_CORE
Pt arrived via EMS ([facility]) c/o LLE pain/swelling    Pt axo3 states \"It started 2 days ago its all my left leg/foot pain. It hurts when I walk.\"    Pt has Extensive medical Hx: CHF,DM,HD ([facility]),Afib, pVD     Pt deny any trauma to area of concern. Discoloration noted but no skin tear noted on arrival.    PT is vaxxed x2 and boostered x2       Family

## 2023-09-15 NOTE — PATIENT PROFILE ADULT - DISASTER - FALL HARM RISK TYPE OF ASSESSMENT
admission Cosentyx Counseling:  I discussed with the patient the risks of Cosentyx including but not limited to worsening of Crohn's disease, immunosuppression, allergic reactions and infections.  The patient understands that monitoring is required including a PPD at baseline and must alert us or the primary physician if symptoms of infection or other concerning signs are noted.

## 2023-12-18 NOTE — DISCHARGE NOTE PROVIDER - NSDCHHENCOUNTER_GEN_ALL_CORE
Pt left  this morning saying she is returning a call to discuss her upcoming appointment. She said she cannot get through and was told to press option 3 which sends her to . Pt stated she would be home all day today waiting by the phone for a call back. [phone]   04-Jun-2020